# Patient Record
Sex: FEMALE | Race: WHITE | NOT HISPANIC OR LATINO | Employment: FULL TIME | URBAN - NONMETROPOLITAN AREA
[De-identification: names, ages, dates, MRNs, and addresses within clinical notes are randomized per-mention and may not be internally consistent; named-entity substitution may affect disease eponyms.]

---

## 2020-12-10 ENCOUNTER — HOSPITAL ENCOUNTER (INPATIENT)
Facility: HOSPITAL | Age: 40
LOS: 2 days | Discharge: HOME | End: 2020-12-12
Attending: INTERNAL MEDICINE | Admitting: INTERNAL MEDICINE

## 2020-12-10 DIAGNOSIS — K92.2 GASTROINTESTINAL HEMORRHAGE, UNSPECIFIED GASTROINTESTINAL HEMORRHAGE TYPE: Primary | ICD-10-CM

## 2020-12-10 DIAGNOSIS — D64.9 SYMPTOMATIC ANEMIA: ICD-10-CM

## 2020-12-10 DIAGNOSIS — R10.13 ACUTE EPIGASTRIC PAIN: ICD-10-CM

## 2020-12-10 LAB
ABO GROUP BLD: NORMAL
ABO GROUP BLD: NORMAL
ALBUMIN SERPL-MCNC: 4.7 G/DL (ref 3.5–5.2)
ALBUMIN/GLOB SERPL: 1.6 G/DL
ALP SERPL-CCNC: 63 U/L (ref 39–117)
ALT SERPL W P-5'-P-CCNC: 10 U/L (ref 1–33)
ANION GAP SERPL CALCULATED.3IONS-SCNC: 15.4 MMOL/L (ref 5–15)
APTT PPP: 30.7 SECONDS (ref 24.5–37.2)
AST SERPL-CCNC: 19 U/L (ref 1–32)
BILIRUB SERPL-MCNC: 1.3 MG/DL (ref 0–1.2)
BLD GP AB SCN SERPL QL: NEGATIVE
BUN SERPL-MCNC: 6 MG/DL (ref 6–20)
BUN/CREAT SERPL: 11.8 (ref 7–25)
CALCIUM SPEC-SCNC: 9.7 MG/DL (ref 8.6–10.5)
CHLORIDE SERPL-SCNC: 103 MMOL/L (ref 98–107)
CO2 SERPL-SCNC: 20.6 MMOL/L (ref 22–29)
CREAT SERPL-MCNC: 0.51 MG/DL (ref 0.57–1)
DEPRECATED RDW RBC AUTO: 43.1 FL (ref 37–54)
ERYTHROCYTE [DISTWIDTH] IN BLOOD BY AUTOMATED COUNT: 18.1 % (ref 12.3–15.4)
GFR SERPL CREATININE-BSD FRML MDRD: 132 ML/MIN/1.73
GLOBULIN UR ELPH-MCNC: 3 GM/DL
GLUCOSE SERPL-MCNC: 98 MG/DL (ref 65–99)
HCT VFR BLD AUTO: 23.2 % (ref 34–46.6)
HGB BLD-MCNC: 5.8 G/DL (ref 12–15.9)
IRON 24H UR-MRATE: 14 MCG/DL (ref 37–145)
IRON SATN MFR SERPL: 2 % (ref 20–50)
MCH RBC QN AUTO: 16.6 PG (ref 26.6–33)
MCHC RBC AUTO-ENTMCNC: 25 G/DL (ref 31.5–35.7)
MCV RBC AUTO: 66.3 FL (ref 79–97)
PLATELET # BLD AUTO: 132 10*3/MM3 (ref 140–450)
PMV BLD AUTO: ABNORMAL FL
POTASSIUM SERPL-SCNC: 3.3 MMOL/L (ref 3.5–5.2)
PROT SERPL-MCNC: 7.7 G/DL (ref 6–8.5)
RBC # BLD AUTO: 3.5 10*6/MM3 (ref 3.77–5.28)
RH BLD: POSITIVE
RH BLD: POSITIVE
SODIUM SERPL-SCNC: 139 MMOL/L (ref 136–145)
T&S EXPIRATION DATE: NORMAL
TIBC SERPL-MCNC: 602 MCG/DL (ref 298–536)
TRANSFERRIN SERPL-MCNC: 404 MG/DL (ref 200–360)
WBC # BLD AUTO: 3.11 10*3/MM3 (ref 3.4–10.8)

## 2020-12-10 PROCEDURE — 86900 BLOOD TYPING SEROLOGIC ABO: CPT | Performed by: INTERNAL MEDICINE

## 2020-12-10 PROCEDURE — 36430 TRANSFUSION BLD/BLD COMPNT: CPT

## 2020-12-10 PROCEDURE — 80053 COMPREHEN METABOLIC PANEL: CPT | Performed by: INTERNAL MEDICINE

## 2020-12-10 PROCEDURE — 93005 ELECTROCARDIOGRAM TRACING: CPT | Performed by: INTERNAL MEDICINE

## 2020-12-10 PROCEDURE — 86900 BLOOD TYPING SEROLOGIC ABO: CPT

## 2020-12-10 PROCEDURE — P9016 RBC LEUKOCYTES REDUCED: HCPCS

## 2020-12-10 PROCEDURE — 86901 BLOOD TYPING SEROLOGIC RH(D): CPT

## 2020-12-10 PROCEDURE — 85027 COMPLETE CBC AUTOMATED: CPT | Performed by: INTERNAL MEDICINE

## 2020-12-10 PROCEDURE — 86850 RBC ANTIBODY SCREEN: CPT | Performed by: INTERNAL MEDICINE

## 2020-12-10 PROCEDURE — 83540 ASSAY OF IRON: CPT | Performed by: INTERNAL MEDICINE

## 2020-12-10 PROCEDURE — 25010000002 METOCLOPRAMIDE PER 10 MG: Performed by: INTERNAL MEDICINE

## 2020-12-10 PROCEDURE — 86920 COMPATIBILITY TEST SPIN: CPT

## 2020-12-10 PROCEDURE — 84466 ASSAY OF TRANSFERRIN: CPT | Performed by: INTERNAL MEDICINE

## 2020-12-10 PROCEDURE — 85730 THROMBOPLASTIN TIME PARTIAL: CPT | Performed by: INTERNAL MEDICINE

## 2020-12-10 PROCEDURE — 86901 BLOOD TYPING SEROLOGIC RH(D): CPT | Performed by: INTERNAL MEDICINE

## 2020-12-10 PROCEDURE — 99253 IP/OBS CNSLTJ NEW/EST LOW 45: CPT | Performed by: INTERNAL MEDICINE

## 2020-12-10 RX ORDER — PANTOPRAZOLE SODIUM 40 MG/10ML
40 INJECTION, POWDER, LYOPHILIZED, FOR SOLUTION INTRAVENOUS EVERY 12 HOURS SCHEDULED
Status: DISCONTINUED | OUTPATIENT
Start: 2020-12-10 | End: 2020-12-12 | Stop reason: HOSPADM

## 2020-12-10 RX ORDER — SODIUM CHLORIDE 9 MG/ML
75 INJECTION, SOLUTION INTRAVENOUS CONTINUOUS
Status: DISCONTINUED | OUTPATIENT
Start: 2020-12-11 | End: 2020-12-11

## 2020-12-10 RX ORDER — METOCLOPRAMIDE 10 MG/1
10 TABLET ORAL
COMMUNITY
End: 2021-02-10

## 2020-12-10 RX ORDER — METOCLOPRAMIDE HYDROCHLORIDE 5 MG/ML
5 INJECTION INTRAMUSCULAR; INTRAVENOUS
Status: DISCONTINUED | OUTPATIENT
Start: 2020-12-10 | End: 2020-12-12 | Stop reason: HOSPADM

## 2020-12-10 RX ORDER — ONDANSETRON 2 MG/ML
4 INJECTION INTRAMUSCULAR; INTRAVENOUS EVERY 6 HOURS PRN
Status: DISCONTINUED | OUTPATIENT
Start: 2020-12-10 | End: 2020-12-12 | Stop reason: HOSPADM

## 2020-12-10 RX ORDER — SIMETHICONE 80 MG
80 TABLET,CHEWABLE ORAL 4 TIMES DAILY PRN
Status: DISCONTINUED | OUTPATIENT
Start: 2020-12-10 | End: 2020-12-12 | Stop reason: HOSPADM

## 2020-12-10 RX ORDER — PANTOPRAZOLE SODIUM 40 MG/1
40 TABLET, DELAYED RELEASE ORAL DAILY
COMMUNITY
End: 2021-02-10

## 2020-12-10 RX ADMIN — PANTOPRAZOLE SODIUM 40 MG: 40 INJECTION, POWDER, FOR SOLUTION INTRAVENOUS at 21:15

## 2020-12-10 RX ADMIN — METOCLOPRAMIDE 5 MG: 5 INJECTION, SOLUTION INTRAMUSCULAR; INTRAVENOUS at 21:15

## 2020-12-10 RX ADMIN — SODIUM CHLORIDE 75 ML/HR: 9 INJECTION, SOLUTION INTRAVENOUS at 23:44

## 2020-12-11 ENCOUNTER — ANESTHESIA (OUTPATIENT)
Dept: GASTROENTEROLOGY | Facility: HOSPITAL | Age: 40
End: 2020-12-11

## 2020-12-11 ENCOUNTER — APPOINTMENT (OUTPATIENT)
Dept: CT IMAGING | Facility: HOSPITAL | Age: 40
End: 2020-12-11

## 2020-12-11 ENCOUNTER — ANESTHESIA EVENT (OUTPATIENT)
Dept: GASTROENTEROLOGY | Facility: HOSPITAL | Age: 40
End: 2020-12-11

## 2020-12-11 ENCOUNTER — APPOINTMENT (OUTPATIENT)
Dept: ULTRASOUND IMAGING | Facility: HOSPITAL | Age: 40
End: 2020-12-11

## 2020-12-11 PROBLEM — K92.2 GASTROINTESTINAL HEMORRHAGE: Status: ACTIVE | Noted: 2020-12-10

## 2020-12-11 LAB
ALBUMIN SERPL-MCNC: 4.5 G/DL (ref 3.5–5.2)
ALBUMIN/GLOB SERPL: 1.4 G/DL
ALP SERPL-CCNC: 61 U/L (ref 39–117)
ALT SERPL W P-5'-P-CCNC: 11 U/L (ref 1–33)
ANION GAP SERPL CALCULATED.3IONS-SCNC: 13.4 MMOL/L (ref 5–15)
AST SERPL-CCNC: 23 U/L (ref 1–32)
B-HCG UR QL: NEGATIVE
BILIRUB SERPL-MCNC: 2.9 MG/DL (ref 0–1.2)
BUN SERPL-MCNC: 6 MG/DL (ref 6–20)
BUN/CREAT SERPL: 9.5 (ref 7–25)
CALCIUM SPEC-SCNC: 9.6 MG/DL (ref 8.6–10.5)
CHLORIDE SERPL-SCNC: 106 MMOL/L (ref 98–107)
CO2 SERPL-SCNC: 20.6 MMOL/L (ref 22–29)
CREAT SERPL-MCNC: 0.63 MG/DL (ref 0.57–1)
DEPRECATED RDW RBC AUTO: 61.4 FL (ref 37–54)
ERYTHROCYTE [DISTWIDTH] IN BLOOD BY AUTOMATED COUNT: 23.2 % (ref 12.3–15.4)
GFR SERPL CREATININE-BSD FRML MDRD: 104 ML/MIN/1.73
GLOBULIN UR ELPH-MCNC: 3.2 GM/DL
GLUCOSE BLDC GLUCOMTR-MCNC: 89 MG/DL (ref 70–130)
GLUCOSE SERPL-MCNC: 87 MG/DL (ref 65–99)
HCT VFR BLD AUTO: 34.6 % (ref 34–46.6)
HGB BLD-MCNC: 10 G/DL (ref 12–15.9)
MCH RBC QN AUTO: 21.5 PG (ref 26.6–33)
MCHC RBC AUTO-ENTMCNC: 28.9 G/DL (ref 31.5–35.7)
MCV RBC AUTO: 74.4 FL (ref 79–97)
PLATELET # BLD AUTO: 118 10*3/MM3 (ref 140–450)
PMV BLD AUTO: 9.4 FL (ref 6–12)
POTASSIUM SERPL-SCNC: 3.7 MMOL/L (ref 3.5–5.2)
PROT SERPL-MCNC: 7.7 G/DL (ref 6–8.5)
QT INTERVAL: 384 MS
QTC INTERVAL: 456 MS
RBC # BLD AUTO: 4.65 10*6/MM3 (ref 3.77–5.28)
SARS-COV-2 RNA PNL SPEC NAA+PROBE: NOT DETECTED
SODIUM SERPL-SCNC: 140 MMOL/L (ref 136–145)
WBC # BLD AUTO: 3.59 10*3/MM3 (ref 3.4–10.8)

## 2020-12-11 PROCEDURE — 86900 BLOOD TYPING SEROLOGIC ABO: CPT

## 2020-12-11 PROCEDURE — 87635 SARS-COV-2 COVID-19 AMP PRB: CPT | Performed by: INTERNAL MEDICINE

## 2020-12-11 PROCEDURE — 85027 COMPLETE CBC AUTOMATED: CPT | Performed by: INTERNAL MEDICINE

## 2020-12-11 PROCEDURE — 76857 US EXAM PELVIC LIMITED: CPT

## 2020-12-11 PROCEDURE — 80053 COMPREHEN METABOLIC PANEL: CPT | Performed by: INTERNAL MEDICINE

## 2020-12-11 PROCEDURE — 81025 URINE PREGNANCY TEST: CPT | Performed by: INTERNAL MEDICINE

## 2020-12-11 PROCEDURE — 82962 GLUCOSE BLOOD TEST: CPT

## 2020-12-11 PROCEDURE — 25010000002 PROPOFOL 200 MG/20ML EMULSION: Performed by: NURSE ANESTHETIST, CERTIFIED REGISTERED

## 2020-12-11 PROCEDURE — 93010 ELECTROCARDIOGRAM REPORT: CPT | Performed by: INTERNAL MEDICINE

## 2020-12-11 PROCEDURE — 25010000002 IOPAMIDOL 61 % SOLUTION: Performed by: INTERNAL MEDICINE

## 2020-12-11 PROCEDURE — 25010000002 METOCLOPRAMIDE PER 10 MG: Performed by: INTERNAL MEDICINE

## 2020-12-11 PROCEDURE — 0DB98ZX EXCISION OF DUODENUM, VIA NATURAL OR ARTIFICIAL OPENING ENDOSCOPIC, DIAGNOSTIC: ICD-10-PCS | Performed by: INTERNAL MEDICINE

## 2020-12-11 PROCEDURE — 0 DIATRIZOATE MEGLUMINE & SODIUM PER 1 ML: Performed by: INTERNAL MEDICINE

## 2020-12-11 PROCEDURE — 25810000003 SODIUM CHLORIDE 0.9 % WITH KCL 20 MEQ 20-0.9 MEQ/L-% SOLUTION: Performed by: INTERNAL MEDICINE

## 2020-12-11 PROCEDURE — P9016 RBC LEUKOCYTES REDUCED: HCPCS

## 2020-12-11 PROCEDURE — 74177 CT ABD & PELVIS W/CONTRAST: CPT

## 2020-12-11 PROCEDURE — 36430 TRANSFUSION BLD/BLD COMPNT: CPT

## 2020-12-11 PROCEDURE — 43239 EGD BIOPSY SINGLE/MULTIPLE: CPT | Performed by: INTERNAL MEDICINE

## 2020-12-11 RX ORDER — MAGNESIUM HYDROXIDE 1200 MG/15ML
LIQUID ORAL AS NEEDED
Status: DISCONTINUED | OUTPATIENT
Start: 2020-12-11 | End: 2020-12-11 | Stop reason: HOSPADM

## 2020-12-11 RX ORDER — PROPOFOL 10 MG/ML
INJECTION, EMULSION INTRAVENOUS AS NEEDED
Status: DISCONTINUED | OUTPATIENT
Start: 2020-12-11 | End: 2020-12-11 | Stop reason: SURG

## 2020-12-11 RX ORDER — SODIUM CHLORIDE AND POTASSIUM CHLORIDE 150; 900 MG/100ML; MG/100ML
75 INJECTION, SOLUTION INTRAVENOUS CONTINUOUS
Status: DISCONTINUED | OUTPATIENT
Start: 2020-12-11 | End: 2020-12-12

## 2020-12-11 RX ORDER — SODIUM CHLORIDE 9 MG/ML
70 INJECTION, SOLUTION INTRAVENOUS CONTINUOUS PRN
Status: DISCONTINUED | OUTPATIENT
Start: 2020-12-11 | End: 2020-12-12 | Stop reason: HOSPADM

## 2020-12-11 RX ADMIN — DIATRIZOATE MEGLUMINE AND DIATRIZOATE SODIUM 30 ML: 660; 100 LIQUID ORAL; RECTAL at 20:45

## 2020-12-11 RX ADMIN — IOPAMIDOL 100 ML: 612 INJECTION, SOLUTION INTRAVENOUS at 20:45

## 2020-12-11 RX ADMIN — METOCLOPRAMIDE 5 MG: 5 INJECTION, SOLUTION INTRAMUSCULAR; INTRAVENOUS at 07:01

## 2020-12-11 RX ADMIN — PROPOFOL 100 MG: 10 INJECTION, EMULSION INTRAVENOUS at 13:45

## 2020-12-11 RX ADMIN — METOCLOPRAMIDE 5 MG: 5 INJECTION, SOLUTION INTRAMUSCULAR; INTRAVENOUS at 16:48

## 2020-12-11 RX ADMIN — POTASSIUM CHLORIDE AND SODIUM CHLORIDE 75 ML/HR: 900; 150 INJECTION, SOLUTION INTRAVENOUS at 09:35

## 2020-12-11 RX ADMIN — PANTOPRAZOLE SODIUM 40 MG: 40 INJECTION, POWDER, FOR SOLUTION INTRAVENOUS at 08:34

## 2020-12-11 RX ADMIN — SODIUM CHLORIDE: 9 INJECTION, SOLUTION INTRAVENOUS at 13:36

## 2020-12-11 RX ADMIN — METOCLOPRAMIDE 5 MG: 5 INJECTION, SOLUTION INTRAMUSCULAR; INTRAVENOUS at 22:27

## 2020-12-11 RX ADMIN — PANTOPRAZOLE SODIUM 40 MG: 40 INJECTION, POWDER, FOR SOLUTION INTRAVENOUS at 22:27

## 2020-12-11 NOTE — CONSULTS
In Patient Consult      Date of Consultation: December 10, 2020  Patient Name: Amanda Beasley  MRN: [id number]  : 1980     Referring provider: Tra Murphy MD    Primary care provider:  Tra Murphy MD    Reason for consultation: Severe symptomatic anemia    History of Present Illness: This is 40-year-old female patient with a prior history of gastroesophageal reflux disease, chronic anemia, menorrhagia was a sent from PCPs office for severe anemia, upper abdominal pain and reflux reflux symptoms.     Patient states that the she was doing fine until 2 days ago she developed significant reflux symptoms with a epigastric pain and belching.  She felt bloated.  She went to PCPs office and had a prescription for PPI was given blood work was drawn.  Blood work came out today with a severe anemia with a hemoglobin of 5.8 g/dL for which she was been told come to hospital for admission.     Patient currently still states epigastric pain with excessive belching and acid reflux.  She denies any vomiting denies any nausea.  She states that she has been having regular bowel movements without any melena or bright red rectal bleed.  There was no fever chills.  She denies any recent use of NSAIDs however in July she took few days of ibuprofen.     She states that she had a chronic anemia her hemoglobin was running around 8 to 9 g/dL in the past.  Her PCP was in [place] since July patient is in [place].  She states that she had a heavy menstrual periods in the past.  She used to get a periods twice in the month, frequency got improved slowly however she continued to have a excessive bleeding monthly.  She is currently having periods.    She denies any prior history of any endoscopy or colonoscopy.  No family history of any colon cancer or any GI malignancy.  She denies any prior history peptic ulcer disease.     Subjective     Past Medical History:   Diagnosis Date   • GERD (gastroesophageal reflux disease)         History reviewed. No pertinent surgical history.    History reviewed. No pertinent family history.    Social History     Socioeconomic History   • Marital status:      Spouse name: Not on file   • Number of children: Not on file   • Years of education: Not on file   • Highest education level: Not on file   Tobacco Use   • Smoking status: Never Smoker   • Smokeless tobacco: Never Used   Substance and Sexual Activity   • Alcohol use: Never     Frequency: Never   • Drug use: Never         Current Facility-Administered Medications:   •  metoclopramide (REGLAN) injection 5 mg, 5 mg, Intravenous, 4x Daily AC & at Bedtime, Tra Murphy MD  •  ondansetron (ZOFRAN) injection 4 mg, 4 mg, Intravenous, Q6H PRN, Tra Murphy MD  •  pantoprazole (PROTONIX) injection 40 mg, 40 mg, Intravenous, Q12H, Tra Murphy MD  •  simethicone (MYLICON) chewable tablet 80 mg, 80 mg, Oral, 4x Daily PRN, Tra Murphy MD  •  [START ON 12/11/2020] sodium chloride 0.9 % infusion, 75 mL/hr, Intravenous, Continuous, Tra Murphy MD    No Known Allergies    Review of Systems   Constitutional: Negative for chills, fever, unexpected weight gain and unexpected weight loss.   HENT: Negative for congestion, ear pain, postnasal drip, sinus pressure and sore throat.    Eyes: Negative for blurred vision and visual disturbance.   Respiratory: Negative for cough, chest tightness and shortness of breath.    Cardiovascular: Negative for chest pain, palpitations and leg swelling.   Gastrointestinal: Positive for abdominal pain and indigestion. Negative for blood in stool, constipation, diarrhea, nausea and vomiting.        Excessive belching   Endocrine: Negative for polyphagia.   Genitourinary: Negative for dysuria and hematuria.   Musculoskeletal: Negative for back pain, joint swelling and neck pain.   Skin: Negative for rash, skin lesions and bruise.   Neurological: Negative for dizziness, seizures, speech difficulty, weakness,  "numbness and confusion.   Hematological: Negative for adenopathy. Does not bruise/bleed easily.   Psychiatric/Behavioral: Negative for hallucinations, suicidal ideas and depressed mood.        The following portions of the patient's history were reviewed and updated as appropriate: allergies, current medications, past family history, past medical history, past social history, past surgical history and problem list.    Objective     Vitals:    12/10/20 1500 12/10/20 2009   BP: 134/97 143/91   BP Location: Right arm Right arm   Patient Position: Lying Lying   Pulse: 101 98   Resp: 18 18   Temp: 98.9 °F (37.2 °C) 98.4 °F (36.9 °C)   TempSrc: Oral Oral   SpO2: 100%    Weight: 53 kg (116 lb 13.5 oz)    Height: 152.4 cm (60\")        Physical Exam  Vitals signs and nursing note reviewed.   Constitutional:       Appearance: Normal appearance. She is well-developed.   HENT:      Head: Normocephalic and atraumatic.      Right Ear: External ear normal.      Left Ear: External ear normal.   Eyes:      Comments: Severe pallor noted   Neck:      Musculoskeletal: Normal range of motion and neck supple.      Thyroid: No thyromegaly.      Trachea: No tracheal deviation.   Cardiovascular:      Rate and Rhythm: Normal rate and regular rhythm.      Heart sounds: No murmur.   Pulmonary:      Effort: Pulmonary effort is normal. No respiratory distress.      Breath sounds: Normal breath sounds.   Abdominal:      General: Bowel sounds are normal. There is no distension.      Palpations: Abdomen is soft. There is no mass.      Tenderness: There is abdominal tenderness (Mild to moderate tenderness noted in the epigastric region).      Hernia: No hernia is present.   Musculoskeletal: Normal range of motion.   Skin:     General: Skin is warm and dry.   Neurological:      General: No focal deficit present.      Mental Status: She is alert and oriented to person, place, and time.      Cranial Nerves: No cranial nerve deficit.      Sensory: No " sensory deficit.   Psychiatric:         Mood and Affect: Mood normal.         Behavior: Behavior normal.         Thought Content: Thought content normal.         Judgment: Judgment normal.         Results from last 7 days   Lab Units 12/10/20  1840   SODIUM mmol/L 139   POTASSIUM mmol/L 3.3*   CHLORIDE mmol/L 103   CO2 mmol/L 20.6*   BUN mg/dL 6   CREATININE mg/dL 0.51*   CALCIUM mg/dL 9.7   ALBUMIN g/dL 4.70   BILIRUBIN mg/dL 1.3*   ALK PHOS U/L 63   ALT (SGPT) U/L 10   AST (SGOT) U/L 19   GLUCOSE mg/dL 98   WBC 10*3/mm3 3.11*   HEMOGLOBIN g/dL 5.8*   PLATELETS 10*3/mm3 132*       Imaging Results (Last 24 Hours)     ** No results found for the last 24 hours. **          Assessment / Plan      Assessment/Recommendations:     1.  Severe symptomatic anemia  2.  Severe iron deficiency anemia  3.  Suspected a peptic ulcer disease with a possible GI bleed  4.  Gastroesophageal reflux disease  Patient appears to have a chronic iron deficiency anemia.  As per patient her hemoglobin was around 8-9 6-month ago when she was in [place].  She continued to have a heavy vaginal bleed during her periods.   Her anemia appears to be chronic.  However given her epigastric pain worsening reflux symptoms acute peptic ulcer disease and possible upper GI bleed cannot be ruled out.  Patient herself denies any melena or bright red rectal bleed.   She needs an EGD to begin with for further evaluation.  She may also need a colonoscopy if the EGD is normal.  I have discussed the risk and benefits involved with endoscopy and she is agreeable for the procedure.  She can have a clear liquids early in the morning  Keep n.p.o. after 8 AM tomorrow  Transfuse 3 units of PRBC tonight  Repeat CBC in a.m.  She has been scheduled for an EGD at noon  Protonix 40 mg IV twice daily or Protonix drip 8 mg/h  She needs a pelvic ultrasound to rule out uterine fibroids and other pathology to rule out with her severe menorrhagia    5.  Menorrhagia  Pelvic  ultrasound inpatient  Needs outpatient GYN consultation      Thank you very much for letting me participate in the care of this patient.  Please do not hesitate to call me if you have any questions.    Jay Sharma MD  [facility] [place]  12/10/2020  20:15 EST    Please note that portions of this note may have been completed with a voice recognition program.

## 2020-12-11 NOTE — PROGRESS NOTES
[facility]    Name:  Amanda Beasley   Age:  40 y.o.  Sex:  female  :  1980  MRN:  [id number]   Visit Number:  [id number]  Admission Date:  12/10/2020  Date Of Service:  20  Primary Care Physician:  Tra Murphy MD     LOS: 1 day :  Patient Care Team:  Tra Murphy MD as PCP - General (Internal Medicine):      Subjective / Interval History:     40-year-old patient who has been directly admitted because of symptomatic anemia and GI bleed along with menometrorrhagia.  Patient's hemoglobin was 5.8 upon admission.  She has been given 3 transfusions and third transfusion is ongoing.  She is n.p.o. for EGD.  She has got IV Protonix.    Today her symptoms are better at present she is n.p.o. and has not had much pain.  Yesterday evening after eating she had worse dyspepsia along with epigastric pain.      Vital Signs:    Temp:  [97.5 °F (36.4 °C)-98.9 °F (37.2 °C)] 98.2 °F (36.8 °C)  Heart Rate:  [] 78  Resp:  [16-18] 16  BP: (114-143)/(80-97) 114/85    Intake and output:    I/O last 3 completed shifts:  In: 600 [Blood:600]  Out: 100 [Urine:100]  No intake/output data recorded.    Physical Examination:    General Appearance:    Alert and cooperative, not in any acute distress.   Head:    Atraumatic and normocephalic, without obvious abnormality.   Eyes:            PERRLA,  No pallor. Extraocular movements are within normal limits.   Neck:   Supple,  No lymph glands, no bruit   Lungs:     Chest shape is normal. Breath sounds heard bilaterally equally.  No crackles or wheezing.     Heart:    Normal S1 and S2, no murmur,  No JVD   Abdomen:     Normal bowel sounds, no masses, no organomegaly. Soft     mild tenderness on epigastric region on deep palpation,, no guarding, no rebound tenderness   Extremities:   Moves all extremities well, no edema, no cyanosis,    Skin:   No  bruising or rash.   Neurologic:   Grossly nonfocal and moves all extremities.       Laboratory results:  Results from last 7 days   Lab Units 12/10/20  1840   SODIUM mmol/L 139   POTASSIUM mmol/L 3.3*   CHLORIDE mmol/L 103   CO2 mmol/L 20.6*   BUN mg/dL 6   CREATININE mg/dL 0.51*   CALCIUM mg/dL 9.7   BILIRUBIN mg/dL 1.3*   ALK PHOS U/L 63   ALT (SGPT) U/L 10   AST (SGOT) U/L 19   GLUCOSE mg/dL 98     Results from last 7 days   Lab Units 12/10/20  1840   WBC 10*3/mm3 3.11*   HEMOGLOBIN g/dL 5.8*   HEMATOCRIT % 23.2*   PLATELETS 10*3/mm3 132*                   Radiology results:    Imaging Results (Last 24 Hours)     ** No results found for the last 24 hours. **          I have reviewed the patient's radiology reports.    Medication Review:     I have reviewed the patients active and prn medications.     Assessment:      GI bleeding    Symptomatic anemia    Acute epigastric pain    Gastrointestinal hemorrhage  Hypokalemia      Plan:    1.  GI bleeding-most probably it is peptic ulcer disease.  Patient is on IV Protonix and EGD to be planned today    2.  Symptomatic anemia with hemoglobin of 5.8-at present third transfusion is ongoing and will repeat labs at noon    3.  Menometrorrhagia history of excessive bleeding will do pelvic ultrasound and a GYN consult as needed most likely outpatient    4.  Hypokalemia will replace with the IV fluids at present and follow-up    Tra Murphy MD  12/11/20  08:35 EST      Please note that portions of this note were completed with a voice recognition program.

## 2020-12-11 NOTE — ANESTHESIA POSTPROCEDURE EVALUATION
Patient: Amanda Beasley    Procedure Summary     Date: 12/11/20 Room / Location: [facility] ENDOSCOPY 1 / [facility] ENDOSCOPY    Anesthesia Start: 1335 Anesthesia Stop: 1358    Procedure: ESOPHAGOGASTRODUODENOSCOPY WITH BIOPSY (N/A Esophagus) Diagnosis:       Gastrointestinal hemorrhage, unspecified gastrointestinal hemorrhage type      Symptomatic anemia      Acute epigastric pain      (Gastrointestinal hemorrhage, unspecified gastrointestinal hemorrhage type [K92.2])      (Symptomatic anemia [D64.9])      (Acute epigastric pain [R10.13])    Surgeon: Jay Sharma MD Provider: Josh Butler CRNA    Anesthesia Type: MAC ASA Status: 3          Anesthesia Type: MAC    Vitals  Vitals Value Taken Time   /85 12/11/20 1423   Temp 98.1 °F (36.7 °C) 12/11/20 1400   Pulse 65 12/11/20 1549   Resp 14 12/11/20 1423   SpO2 100 % 12/11/20 1425   Vitals shown include unvalidated device data.        Post Anesthesia Care and Evaluation    Patient location during evaluation: PACU  Patient participation: complete - patient participated  Level of consciousness: awake  Pain score: 0  Pain management: satisfactory to patient  Airway patency: patent  Anesthetic complications: No anesthetic complications  PONV Status: none  Cardiovascular status: acceptable and hemodynamically stable  Respiratory status: acceptable  Hydration status: acceptable

## 2020-12-11 NOTE — H&P
[facility]   HISTORY AND PHYSICAL      Name:  Amanda Beasley   Age:  40 y.o.  Sex:  female  :  1980  MRN:  [id number]   Visit Number:  [id number]  Admission Date:  12/10/2020  Date Of Service:  12/10/20  Primary Care Physician:  Tra Murphy MD     Admitting diagnosis:      GI bleeding    Symptomatic anemia    Acute epigastric pain        History Of Presenting Illness:      40-year-old patient with no significant past medical history was seen by me yesterday in the office because of epigastric pain.  She has been taking nonsteroidals over-the-counter due to musculoskeletal pain.  Patient started having nausea with eating also having abdominal epigastric pain after eating since last few days.  Patient had been started on Protonix and Reglan for presumed peptic ulcer disease and gastritis.  She had labs ordered and today the results came back showing hemoglobin was 5.8.  Patient states that she felt almost dizzy passing out like feeling.  She has been directly admitted for management of blood transfusion and evaluation for GI bleeding and IV Protonix which is needed.  Patient most likely has active bleeding from upper gastrointestinal tract with worsening symptomatology.  GI consult has been done    Review Of Systems:     The following systems were reviewed and negative;  constitution, eyes, ENT, respiratory, cardiovascular, gastrointestinal, genitourinary, musculoskeletal, neurological and behavioral/psych,  Skin except as above.     Past Medical History:    Past Medical History:   Diagnosis Date   • GERD (gastroesophageal reflux disease)        Past Surgical history:    History reviewed. No pertinent surgical history.    Social History:    Social History     Socioeconomic History   • Marital status:      Spouse name: Not on file   • Number of children: Not on file   • Years of education: Not on file   • Highest education level: Not on file   Tobacco Use   •  Smoking status: Never Smoker   • Smokeless tobacco: Never Used   Substance and Sexual Activity   • Alcohol use: Never     Frequency: Never   • Drug use: Never       Family History:    History reviewed. No pertinent family history.      Allergies:      Patient has no known allergies.    Home Medications:    Prior to Admission Medications     Prescriptions Last Dose Informant Patient Reported? Taking?    metoclopramide (REGLAN) 10 MG tablet 12/10/2020  Yes Yes    Take 10 mg by mouth 4 (Four) Times a Day Before Meals & at Bedtime.    pantoprazole (PROTONIX) 40 MG EC tablet 12/9/2020  Yes Yes    Take 40 mg by mouth Daily.                 Vital Signs:    Temp:  [98.9 °F (37.2 °C)] 98.9 °F (37.2 °C)  Heart Rate:  [101] 101  Resp:  [18] 18  BP: (134)/(97) 134/97        12/10/20  1500   Weight: 53 kg (116 lb 13.5 oz)       Body mass index is 22.82 kg/m².    Physical Exam:      General Appearance:    Alert and cooperative,  And lying comfortably in bed   Head:    Atraumatic and normocephalic, without obvious abnormality.   Eyes:            PERRLA, conjunctivae and sclerae normal, no Icterus. No pallor. Extraocular movements are within normal limits.   Ears:    Ears appear intact with no abnormalities noted.   Throat:   No oral lesions, no thrush, oral mucosa moist.   Neck:   Supple, trachea midline, no thyromegaly, no carotid bruit, no lymphadenopathy  Lungs-bilateral air entry no crepitations wheezes    Heart:    Normal S1 and S2, no murmur, no gallop, no rub. No JVD   Abdomen:     Normal bowel sounds, no masses, no organomegaly. Soft   there is tenderness over the epigastric region, nondistended, no guarding, no rebound    tenderness   Extremities:   Moves all extremities well, no edema, no cyanosis, no          clubbing   Skin:   No  bruising or rash   Neurologic:   Cranial nerves 2 - 12 grossly intact, sensation intact, Motor power is normal and equal bilaterally.       EKG:      Not done    Labs:    Lab Results (last 24  hours)     Procedure Component Value Units Date/Time    Comprehensive Metabolic Panel [[id number]] Collected: 12/10/20 1840    Specimen: Blood from Arm, Right Updated: 12/10/20 1850    CBC (No Diff) [[id number]] Collected: 12/10/20 1840    Specimen: Blood from Arm, Right Updated: 12/10/20 1850    aPTT [[id number]] Collected: 12/10/20 1840    Specimen: Blood from Arm, Right Updated: 12/10/20 1850          Radiology:    Imaging Results (Last 72 Hours)     ** No results found for the last 72 hours. **          Assessment:    Assessment/Plan       GI bleeding    Symptomatic anemia    Acute epigastric pain        Plan:     40-year-old who has used nonsteroidals comes in with epigastric pain worsening pain after eating and nausea.  Hemoglobin is 5.8 will symptomatic and most probably having upper GI bleeding.  Start with IV Protonix do stat labs and transfuse 3 units to maintain hemoglobin at least above 8.5.  GI consult has been done and will plan to proceed EGD tomorrow.  Case discussed with Dr. Sharma and she will be kept n.p.o.    See rest as per orders and further management changes will be done as per findings and symptomatology    Tra Murphy MD  12/10/20  19:05 EST    Please note that portions of this note were completed with a voice recognition program.

## 2020-12-11 NOTE — PROGRESS NOTES
Discharge Planning Assessment  [facility]     Patient Name: Amanda Beasley  MRN: [id number]  Today's Date: 12/11/2020    Admit Date: 12/10/2020    Discharge Needs Assessment     Row Name 12/11/20 1221       Living Environment    Lives With  child(monet), dependent;spouse    Name(s) of Who Lives With Patient  Spouse and three daughters    Current Living Arrangements  home/apartment/condo    Primary Care Provided by  self    Provides Primary Care For  no one    Family Caregiver if Needed  spouse;other (see comments) older kids    Quality of Family Relationships  supportive    Able to Return to Prior Arrangements  yes       Resource/Environmental Concerns    Resource/Environmental Concerns  none    Transportation Concerns  car, none       Transition Planning    Patient/Family Anticipates Transition to  home    Patient/Family Anticipated Services at Transition  none    Transportation Anticipated  family or friend will provide       Discharge Needs Assessment    Readmission Within the Last 30 Days  no previous admission in last 30 days    Equipment Currently Used at Home  none    Concerns to be Addressed  no discharge needs identified    Anticipated Changes Related to Illness  none    Equipment Needed After Discharge  none        Discharge Plan    Met with patient and oldest daughter Bhaskar in room.  Bhaskar interprets some for the patient, as her english is poor. Permission granted to speak to bhaskar. All demographic information verified and corrected.  No POA or living will.  Patient lives in a two story apartment with her  and their three daughters.  She is independent, and does not have any medical equipment or services at home. No needs identified from a CM standpoint. CM contact card left with patient.         Continued Care and Services - Admitted Since 12/10/2020    Coordination has not been started for this encounter.         Demographic Summary     Row Name 12/11/20 1216       General Information     Admission Type  inpatient    Arrived From  emergency department    Referral Source  admission list    Reason for Consult  discharge planning    Preferred Language  Johanna       Contact Information    Permission Granted to Share Info With  family/designee    Row Name 12/11/20 1157       General Information    Admission Type  inpatient    Arrived From  emergency department    Referral Source  admission list     Used During This Interaction  other (see comments) Daughter in room helps with translation.  The patient speaks a little English.        Functional Status     Row Name 12/11/20 1217       Functional Status    Usual Activity Tolerance  excellent    Current Activity Tolerance  good       Functional Status, IADL    Medications  independent    Meal Preparation  independent    Housekeeping  independent    Laundry  independent    Shopping  independent       Mental Status    General Appearance WDL  WDL       Mental Status Summary    Recent Changes in Mental Status/Cognitive Functioning  no changes        Psychosocial     Row Name 12/11/20 1218       Values/Beliefs    Spiritual, Cultural Beliefs, Yarsani Practices, Values that Affect Care  no       Behavior WDL    Behavior WDL  WDL       Emotion Mood WDL    Emotion/Mood/Affect WDL  WDL       Speech WDL    Speech WDL  WDL       Perceptual State WDL    Perceptual State WDL  WDL       Thought Process WDL    Thought Process WDL  WDL       Intellectual Performance WDL    Intellectual Performance WDL  WDL       Coping/Stress    Major Change/Loss/Stressor  illness    Patient Personal Strengths  strong support system    Sources of Support  dependent child(monet);spouse    Reaction to Health Status  accepting;adjusting    Understanding of Condition and Treatment  adequate understanding of medical condition;adequate understanding of treatment       Developmental Stage (Eriksson's)    Developmental Stage  Stage 7 (35-65 years/Middle Adulthood) Generativity vs.  Stagnation       C-SSRS (Recent)    Q1 Wished to be Dead (Past Month)  no    Q2 Suicidal Thoughts (Past Month)  no    Q6 Suicide Behavior (Lifetime)  no       Violence Risk    Feels Like Hurting Others  no    Previous Attempt to Harm Others  no        Abuse/Neglect     Row Name 12/11/20 1220       Personal Safety    Personal Safety Comments  denies any safety concerns    Row Name 12/11/20 1219       Personal Safety    Feels Unsafe at Home or Work/School  no    Feels Threatened by Someone  no    Does Anyone Try to Keep You From Having Contact with Others or Doing Things Outside Your Home?  no    Physical Signs of Abuse Present  no        Legal     Row Name 12/11/20 1220       Financial/Legal    Source of Income  salary/wages    Who Manages Finances if Patient Unable          Substance Abuse     Row Name 12/11/20 1219       Family Member Substance Use (#4)    Family History of Substance Use  none denies substance abuse in family household        Patient Forms    No documentation.           Luci Rush, RN

## 2020-12-11 NOTE — PLAN OF CARE
Goal Outcome Evaluation:  Plan of Care Reviewed With: patient  Progress: no change  Outcome Summary: VSS.  Pt received 3 units of PRBCs and tolerated them with no issues.  No acute changes to pt condition during shift.  Will continuet to monitor.

## 2020-12-11 NOTE — PLAN OF CARE
Goal Outcome Evaluation:  Plan of Care Reviewed With: spouse, patient  Progress: improving   Down for EGD today.  Vitals unremarkable.  No reports of pain.  Independent ambulation.  No acute events this shift.

## 2020-12-11 NOTE — ANESTHESIA PREPROCEDURE EVALUATION
Anesthesia Evaluation     Patient summary reviewed and Nursing notes reviewed   no history of anesthetic complications:  NPO Solid Status: > 8 hours  NPO Liquid Status: > 8 hours           Airway   Mallampati: I  TM distance: >3 FB  Neck ROM: full  no difficulty expected  Dental - normal exam     Pulmonary - negative pulmonary ROS and normal exam   Cardiovascular - negative cardio ROS and normal exam  Exercise tolerance: good (4-7 METS)        Neuro/Psych- negative ROS  GI/Hepatic/Renal/Endo    (+)  GERD, GI bleeding ,     Musculoskeletal (-) negative ROS    Abdominal    Substance History - negative use     OB/GYN negative ob/gyn ROS         Other - negative ROS         Other Comment: hgb 5.8  Transfused 3 units 12/11.                 Anesthesia Plan    ASA 3     MAC

## 2020-12-12 VITALS
HEART RATE: 67 BPM | OXYGEN SATURATION: 98 % | TEMPERATURE: 98 F | BODY MASS INDEX: 23.37 KG/M2 | WEIGHT: 119.05 LBS | RESPIRATION RATE: 18 BRPM | SYSTOLIC BLOOD PRESSURE: 126 MMHG | DIASTOLIC BLOOD PRESSURE: 90 MMHG | HEIGHT: 60 IN

## 2020-12-12 PROBLEM — D50.0 IRON DEFICIENCY ANEMIA DUE TO CHRONIC BLOOD LOSS: Status: ACTIVE | Noted: 2020-12-12

## 2020-12-12 PROBLEM — N92.1 MENOMETRORRHAGIA: Status: ACTIVE | Noted: 2020-12-12

## 2020-12-12 PROBLEM — K29.00 ACUTE GASTRITIS: Status: ACTIVE | Noted: 2020-12-12

## 2020-12-12 PROBLEM — E80.6 HYPERBILIRUBINEMIA: Status: ACTIVE | Noted: 2020-12-12

## 2020-12-12 PROBLEM — D69.6 THROMBOCYTOPENIA: Status: ACTIVE | Noted: 2020-12-12

## 2020-12-12 LAB
ALBUMIN SERPL-MCNC: 3.9 G/DL (ref 3.5–5.2)
ALBUMIN/GLOB SERPL: 1.3 G/DL
ALP SERPL-CCNC: 55 U/L (ref 39–117)
ALT SERPL W P-5'-P-CCNC: 6 U/L (ref 1–33)
ANION GAP SERPL CALCULATED.3IONS-SCNC: 9.5 MMOL/L (ref 5–15)
AST SERPL-CCNC: 18 U/L (ref 1–32)
BH BB BLOOD EXPIRATION DATE: NORMAL
BH BB BLOOD TYPE BARCODE: 7300
BH BB DISPENSE STATUS: NORMAL
BH BB PRODUCT CODE: NORMAL
BH BB UNIT NUMBER: NORMAL
BILIRUB SERPL-MCNC: 2.8 MG/DL (ref 0–1.2)
BUN SERPL-MCNC: 6 MG/DL (ref 6–20)
BUN/CREAT SERPL: 11.1 (ref 7–25)
CALCIUM SPEC-SCNC: 9.1 MG/DL (ref 8.6–10.5)
CHLORIDE SERPL-SCNC: 108 MMOL/L (ref 98–107)
CO2 SERPL-SCNC: 20.5 MMOL/L (ref 22–29)
CREAT SERPL-MCNC: 0.54 MG/DL (ref 0.57–1)
CROSSMATCH INTERPRETATION: NORMAL
DEPRECATED RDW RBC AUTO: 61 FL (ref 37–54)
ERYTHROCYTE [DISTWIDTH] IN BLOOD BY AUTOMATED COUNT: 22.7 % (ref 12.3–15.4)
GFR SERPL CREATININE-BSD FRML MDRD: 124 ML/MIN/1.73
GLOBULIN UR ELPH-MCNC: 2.9 GM/DL
GLUCOSE SERPL-MCNC: 76 MG/DL (ref 65–99)
HCT VFR BLD AUTO: 33 % (ref 34–46.6)
HGB BLD-MCNC: 9.6 G/DL (ref 12–15.9)
MCH RBC QN AUTO: 21.7 PG (ref 26.6–33)
MCHC RBC AUTO-ENTMCNC: 29.1 G/DL (ref 31.5–35.7)
MCV RBC AUTO: 74.5 FL (ref 79–97)
PLATELET # BLD AUTO: 90 10*3/MM3 (ref 140–450)
POTASSIUM SERPL-SCNC: 3.7 MMOL/L (ref 3.5–5.2)
PROT SERPL-MCNC: 6.8 G/DL (ref 6–8.5)
RBC # BLD AUTO: 4.43 10*6/MM3 (ref 3.77–5.28)
SODIUM SERPL-SCNC: 138 MMOL/L (ref 136–145)
UNIT  ABO: NORMAL
UNIT  RH: NORMAL
WBC # BLD AUTO: 2.86 10*3/MM3 (ref 3.4–10.8)

## 2020-12-12 PROCEDURE — 25810000003 SODIUM CHLORIDE 0.9 % WITH KCL 20 MEQ 20-0.9 MEQ/L-% SOLUTION: Performed by: INTERNAL MEDICINE

## 2020-12-12 PROCEDURE — 85027 COMPLETE CBC AUTOMATED: CPT | Performed by: INTERNAL MEDICINE

## 2020-12-12 PROCEDURE — 80053 COMPREHEN METABOLIC PANEL: CPT | Performed by: INTERNAL MEDICINE

## 2020-12-12 PROCEDURE — 25010000002 METOCLOPRAMIDE PER 10 MG: Performed by: INTERNAL MEDICINE

## 2020-12-12 PROCEDURE — 99238 HOSP IP/OBS DSCHRG MGMT 30/<: CPT | Performed by: INTERNAL MEDICINE

## 2020-12-12 PROCEDURE — 99232 SBSQ HOSP IP/OBS MODERATE 35: CPT | Performed by: INTERNAL MEDICINE

## 2020-12-12 RX ORDER — SIMETHICONE 80 MG
80 TABLET,CHEWABLE ORAL 4 TIMES DAILY PRN
Qty: 40 TABLET | Refills: 0 | Status: SHIPPED | OUTPATIENT
Start: 2020-12-12 | End: 2021-02-10

## 2020-12-12 RX ORDER — DOXYCYCLINE HYCLATE 50 MG/1
324 CAPSULE, GELATIN COATED ORAL
Qty: 30 TABLET | Refills: 0 | Status: SHIPPED | OUTPATIENT
Start: 2020-12-12 | End: 2021-02-10

## 2020-12-12 RX ADMIN — POTASSIUM CHLORIDE AND SODIUM CHLORIDE 75 ML/HR: 900; 150 INJECTION, SOLUTION INTRAVENOUS at 05:25

## 2020-12-12 RX ADMIN — PANTOPRAZOLE SODIUM 40 MG: 40 INJECTION, POWDER, FOR SOLUTION INTRAVENOUS at 08:56

## 2020-12-12 RX ADMIN — METOCLOPRAMIDE 5 MG: 5 INJECTION, SOLUTION INTRAMUSCULAR; INTRAVENOUS at 06:52

## 2020-12-12 NOTE — PLAN OF CARE
Goal Outcome Evaluation:  Plan of Care Reviewed With: patient  Progress: improving  Outcome Summary: VSS.  No acute changes in Pt condition to report.  Will continue to monitor.

## 2020-12-12 NOTE — PROGRESS NOTES
Case Management Discharge Note                Selected Continued Care - Discharged on 12/12/2020 Admission date: 12/10/2020 - Discharge disposition: Home or Self Care    Destination    No services have been selected for the patient.              Durable Medical Equipment    No services have been selected for the patient.              Dialysis/Infusion    No services have been selected for the patient.              Home Medical Care    No services have been selected for the patient.              Therapy    No services have been selected for the patient.              Community Resources    No services have been selected for the patient.                       Final Discharge Disposition Code: 01 - home or self-care

## 2020-12-12 NOTE — PROGRESS NOTES
In Patient Consult      Date of Consultation: 2020  Patient Name: Amanda Beasley  MRN: [id number]  : 1980     Referring provider: Tra Murphy MD    Primary care provider:  Tra Murphy MD    Reason for consultation: Acute severe anemia    Interval history:   2020  She is feeling much better still has some burping and bloating sensation.  Denies any bowel movement since yesterday.  No nausea vomiting.  She had a an endoscopy done on 2020.  No events post procedure.    12/10/2020  This is 40-year-old female patient with a prior history of gastroesophageal reflux disease, chronic anemia, menorrhagia was a sent from PCPs office for severe anemia, upper abdominal pain and reflux reflux symptoms.      Patient states that the she was doing fine until 2 days ago she developed significant reflux symptoms with a epigastric pain and belching.  She felt bloated.  She went to PCPs office and had a prescription for PPI was given blood work was drawn.  Blood work came out today with a severe anemia with a hemoglobin of 5.8 g/dL for which she was been told come to hospital for admission.      Patient currently still states epigastric pain with excessive belching and acid reflux.  She denies any vomiting denies any nausea.  She states that she has been having regular bowel movements without any melena or bright red rectal bleed.  There was no fever chills.  She denies any recent use of NSAIDs however in July she took few days of ibuprofen.      She states that she had a chronic anemia her hemoglobin was running around 8 to 9 g/dL in the past.  Her PCP was in [place] since July patient is in [place].  She states that she had a heavy menstrual periods in the past.  She used to get a periods twice in the month, frequency got improved slowly however she continued to have a excessive bleeding monthly.  She is currently having periods.     She denies any prior history of any endoscopy or  colonoscopy.  No family history of any colon cancer or any GI malignancy.  She denies any prior history peptic ulcer disease.      Subjective     Past Medical History:   Diagnosis Date   • GERD (gastroesophageal reflux disease)        History reviewed. No pertinent surgical history.    History reviewed. No pertinent family history.    Social History     Socioeconomic History   • Marital status:      Spouse name: Not on file   • Number of children: Not on file   • Years of education: Not on file   • Highest education level: Not on file   Tobacco Use   • Smoking status: Never Smoker   • Smokeless tobacco: Never Used   Substance and Sexual Activity   • Alcohol use: Never     Frequency: Never   • Drug use: Never         Current Facility-Administered Medications:   •  metoclopramide (REGLAN) injection 5 mg, 5 mg, Intravenous, 4x Daily AC & at Bedtime, Jay Sharma MD, 5 mg at 12/12/20 0652  •  ondansetron (ZOFRAN) injection 4 mg, 4 mg, Intravenous, Q6H PRN, Jay Sharma MD  •  pantoprazole (PROTONIX) injection 40 mg, 40 mg, Intravenous, Q12H, Jay Sharma MD, 40 mg at 12/12/20 0856  •  simethicone (MYLICON) chewable tablet 80 mg, 80 mg, Oral, 4x Daily PRN, Jay Sharma MD  •  sodium chloride 0.9 % infusion, 70 mL/hr, Intravenous, Continuous PRN, Jay Sharma MD, Currently Infusing at 12/11/20 1337    No Known Allergies    Review of Systems   Constitutional: Positive for fatigue. Negative for appetite change and unexpected weight loss.   Gastrointestinal: Positive for GERD and indigestion. Negative for abdominal distention, abdominal pain, anal bleeding, blood in stool, constipation, diarrhea, nausea, rectal pain and vomiting.       The following portions of the patient's history were reviewed and updated as appropriate: allergies, current medications, past family history, past medical history, past social history, past surgical history and problem  list.    Objective     Vitals:    12/12/20 0020 12/12/20 0230 12/12/20 0414 12/12/20 0836   BP: 109/69  117/89 126/90   BP Location: Right arm  Right arm Right arm   Patient Position: Lying  Lying Lying   Pulse: 58 63 60 67   Resp: 14  12 18   Temp: 97.3 °F (36.3 °C)  97.2 °F (36.2 °C) 98 °F (36.7 °C)   TempSrc: Oral  Oral Oral   SpO2: 100%  100% 98%   Weight:       Height:           Physical Exam  Vitals signs and nursing note reviewed.   Constitutional:       Appearance: She is well-developed.   HENT:      Mouth/Throat:      Mouth: Mucous membranes are moist.   Eyes:      Comments: Pallor present   Neck:      Musculoskeletal: Normal range of motion and neck supple.   Cardiovascular:      Rate and Rhythm: Normal rate and regular rhythm.      Heart sounds: No murmur.   Pulmonary:      Effort: Pulmonary effort is normal. No respiratory distress.      Breath sounds: Normal breath sounds.   Abdominal:      General: Bowel sounds are normal. There is no distension.      Palpations: Abdomen is soft. There is no mass.      Tenderness: There is no abdominal tenderness.      Hernia: No hernia is present.   Lymphadenopathy:      Cervical: No cervical adenopathy.   Skin:     General: Skin is warm and dry.   Neurological:      General: No focal deficit present.      Mental Status: She is alert and oriented to person, place, and time.      Sensory: No sensory deficit.         Results from last 7 days   Lab Units 12/12/20  0857 12/11/20  1058 12/10/20  1840   SODIUM mmol/L 138 140 139   POTASSIUM mmol/L 3.7 3.7 3.3*   CHLORIDE mmol/L 108* 106 103   CO2 mmol/L 20.5* 20.6* 20.6*   BUN mg/dL 6 6 6   CREATININE mg/dL 0.54* 0.63 0.51*   CALCIUM mg/dL 9.1 9.6 9.7   ALBUMIN g/dL 3.90 4.50 4.70   BILIRUBIN mg/dL 2.8* 2.9* 1.3*   ALK PHOS U/L 55 61 63   ALT (SGPT) U/L 6 11 10   AST (SGOT) U/L 18 23 19   GLUCOSE mg/dL 76 87 98   WBC 10*3/mm3 2.86* 3.59 3.11*   HEMOGLOBIN g/dL 9.6* 10.0* 5.8*   PLATELETS 10*3/mm3 90* 118* 132*       Imaging  Results (Last 24 Hours)     Procedure Component Value Units Date/Time    CT Abdomen Pelvis With Contrast [[id number]] Collected: 12/11/20 2017     Updated: 12/11/20 2018    Narrative:      FINAL REPORT    TECHNIQUE:  Routine axial images through the abdomen and pelvis were  obtained following IV and oral contrast administration.    CLINICAL HISTORY:  Severe symptomatic anemia, No obvious GI source of bleeding    FINDINGS:  Abdomen: The gallbladder is normal.  There is intrahepatic  periportal edema this is nonspecific but can be associated with  hepatitis.  No focal liver lesion is seen.  The solid abdominal  organs and ureters are otherwise unremarkable.  The GI tract is  unremarkable, including the appendix.  Pelvis: The uterus,  ovaries and urinary bladder are normal.  There is no pelvic or  abdominal ascites, adenopathy or acute osseous abnormality.      Impression:      Mild intrahepatic periportal edema of uncertain etiology or  significance.  This could be due to hepatitis.  Otherwise normal  abdomen and pelvis.    Authenticated by Anselmo Lino M.D. on 12/11/2020 08:17:00 PM    US Pelvis Limited [[id number]] Collected: 12/11/20 1208     Updated: 12/11/20 1211    Narrative:      PROCEDURE: US PELVIS LIMITED-     HISTORY: Menometrorrhagia; K92.2-Gastrointestinal hemorrhage,  unspecified; D64.9-Anemia, unspecified; R10.13-Epigastric pain     PROCEDURE:  Sonographic images of the pelvis were obtained  transabdominally.     FINDINGS: The uterus measures  11.4 x 4.6 x 6.2 cm. The endometrium is  11.4 mm . The ovaries are normal in size with small follicles present..  Both ovaries demonstrate appropriate blood flow.. There is no  significant free fluid.       Impression:      Normal pelvic ultrasound.     This report was finalized on 12/11/2020 12:09 PM by Erick Lu M.D..             Assessment / Plan    Assessment/Recommendations:  1.  Severe symptomatic anemia  2.  Severe iron deficiency anemia  3.     Gastroesophageal reflux disease with erosive esophagitis  4.    Menorrhagia  Patient appears to have a chronic anemia most likely associated with heavy menstrual periods.   EGD done on 12/10/2020 did not reveal any upper GI source of bleeding.  She did have LA grade a esophagitis.  Gastric biopsies and duodenal biopsy reports are pending.  No melena no bright red rectal bleed.  Status post 3 units PRBC transfusion with a stable hemoglobin.  CT scan of the abdomen pelvis done showed mild nonspecific nate hepatis edema.  No signs or lab studies indicating any hepatitis.  Pelvic ultrasound did not reveal any pelvic pathology  Okay to DC home from GI standpoint  Continue Protonix 40 mg p.o. daily  Antireflux diet discussed with the patient and the family  Avoid NSAIDs  Iron pills p.o. twice daily for 3 months  Repeat CBC CMP in 4 weeks time  Consider stool softener if there is any constipation with iron pills  She needs a colonoscopy for further evaluation of her iron deficiency anemia and will schedule the same as an outpatient after follow-up in office.  She needs a GYN consultation as an outpatient    5.   Thrombocytopenia  Etiology is unclear.  No prior values to compare.  Current platelet level 90,000 to 100,000  Repeat CBC in 4 weeks time  If any persistent thrombocytopenia she needs an urgent consultation with hematology    6.  Isolated hyperbilirubinemia  This is most likely secondary to hemolysis from her current menstrual periods.   There is no prior values to compare.  Remote possibility of a Gilbert's syndrome cannot be ruled out.  Her rest of the liver enzymes are all normal  Repeat CMP in 4 weeks time      Thank you very much for letting me participate in the care of this patient.  Please do not hesitate to call me if you have any questions.    Jay Sharma MD  [facility] [place]  12/12/2020  11:12 EST    Please note that portions of this note may have been completed with a voice  recognition program.

## 2020-12-12 NOTE — PROGRESS NOTES
No anesthesia related problems.      [facility]    Nerve Cath Post Op Call    Patient Name: Amanda Beasley  :  1980  MRN:  [id number]  Date of Discharge:     POST OP CALL

## 2020-12-12 NOTE — ADDENDUM NOTE
Addendum  created 12/12/20 0872 by Josh Butler CRNA    Clinical Note Signed, Review and Sign - Ready for Procedure

## 2020-12-12 NOTE — DISCHARGE SUMMARY
[facility]   DISCHARGE SUMMARY      Name:  Amanda Beasley   Age:  40 y.o.  Sex:  female  :  1980  MRN:  [id number]   Visit Number:  [id number]  Primary Care Physician:  Tra Murphy MD  Date of Discharge:  2020  Admission Date:  12/10/2020      Discharge Diagnosis:         Symptomatic anemia    Acute epigastric pain    Acute gastritis    Hyperbilirubinemia    Thrombocytopenia (CMS/HCC)    Menometrorrhagia    Iron deficiency anemia due to chronic blood loss  GERD          Consults:     Consults     Date and Time Order Name Status Description    12/10/2020 1809 Inpatient Gastroenterology Consult Completed           Procedures Performed:      Procedure(s):  ESOPHAGOGASTRODUODENOSCOPY WITH BIOPSY   1333 UPPER GI ENDOSCOPY        Hospital Course:   The patient was admitted on 12/10/2020  Please see H&P for details on admission HPI and ROS.  40-year-old patient was a direct admit because of symptomatic anemia.  She had a hemoglobin of 5.8.  She also had dysphagia and pain after eating consistent with peptic ulcer disease.  After admission she was given 3 blood transfusions and her hemoglobin has come up to 9.6 upon discharge.  She also has mild pancytopenia platelet count has been around below 3000 and her platelet has gradually dropped from 1 30,000-90,000.  There is no overt bleeding.  GI consult was done and Dr. Sharma did EGD.  Findings were for no acute ulcer but a gastritis.  Patient was given IV Protonix and 18 switched to oral Protonix.  She will be continued on Reglan for short course and also iron tablets have been started due to iron deficiency anemia.  Patient is stable for discharge and will need to follow-up with Dr. Victoria for colonoscopy if to be done as outpatient.  Also if her symptoms of mild pancytopenia persist and hematology consult will be done.  Patient also has mild hyperbilirubinemia and a CT scan of the abdomen was done which showed no  gallstones and there is mild intrahepatic periportal edema which is nonspecific.  Patient is stable for discharge and I will see the patient back in 1 week.  Follow-up with GI in 4 weeks for colonoscopy    Vital Signs:     Temp:  [97.2 °F (36.2 °C)-98.6 °F (37 °C)] 98 °F (36.7 °C)  Heart Rate:  [58-82] 67  Resp:  [12-18] 18  BP: (109-137)/(69-90) 126/90    Physical Exam:     General Appearance:    Alert and cooperative, not in any acute distress.   Head:    Atraumatic and normocephalic, without obvious abnormality.   Eyes:            PERRLA,  No pallor. Extraocular movements are within normal limits.   Neck:   Supple,  No lymph glands, no bruit   Lungs:     Chest shape is normal. Breath sounds heard bilaterally equally.  No crackles or wheezing.     Heart:    Normal S1 and S2, no murmur,  No JVD   Abdomen:     Normal bowel sounds, no masses, no organomegaly. Soft     nontender, no guarding, no rebound tenderness   Extremities:   Moves all extremities well, no edema, no cyanosis,    Skin:   No  bruising or rash.   Neurologic:   Grossly nonfocal and moves all extremities.        Pertinent Lab Results:     Results from last 7 days   Lab Units 12/12/20  0857 12/11/20  1058 12/10/20  1840   SODIUM mmol/L 138 140 139   POTASSIUM mmol/L 3.7 3.7 3.3*   CHLORIDE mmol/L 108* 106 103   CO2 mmol/L 20.5* 20.6* 20.6*   BUN mg/dL 6 6 6   CREATININE mg/dL 0.54* 0.63 0.51*   CALCIUM mg/dL 9.1 9.6 9.7   BILIRUBIN mg/dL 2.8* 2.9* 1.3*   ALK PHOS U/L 55 61 63   ALT (SGPT) U/L 6 11 10   AST (SGOT) U/L 18 23 19   GLUCOSE mg/dL 76 87 98     Results from last 7 days   Lab Units 12/12/20  0857 12/11/20  1058 12/10/20  1840   WBC 10*3/mm3 2.86* 3.59 3.11*   HEMOGLOBIN g/dL 9.6* 10.0* 5.8*   HEMATOCRIT % 33.0* 34.6 23.2*   PLATELETS 10*3/mm3 90* 118* 132*         No results found for: BLOODCX, URINECX, WOUNDCX, MRSACX    Pertinent Radiology Results:    Imaging Results (Most Recent)     Procedure Component Value Units Date/Time    CT Abdomen  Pelvis With Contrast [[id number]] Collected: 12/11/20 2017     Updated: 12/11/20 2018    Narrative:      FINAL REPORT    TECHNIQUE:  Routine axial images through the abdomen and pelvis were  obtained following IV and oral contrast administration.    CLINICAL HISTORY:  Severe symptomatic anemia, No obvious GI source of bleeding    FINDINGS:  Abdomen: The gallbladder is normal.  There is intrahepatic  periportal edema this is nonspecific but can be associated with  hepatitis.  No focal liver lesion is seen.  The solid abdominal  organs and ureters are otherwise unremarkable.  The GI tract is  unremarkable, including the appendix.  Pelvis: The uterus,  ovaries and urinary bladder are normal.  There is no pelvic or  abdominal ascites, adenopathy or acute osseous abnormality.      Impression:      Mild intrahepatic periportal edema of uncertain etiology or  significance.  This could be due to hepatitis.  Otherwise normal  abdomen and pelvis.    Authenticated by Anselmo Lino M.D. on 12/11/2020 08:17:00 PM    US Pelvis Limited [[id number]] Collected: 12/11/20 1208     Updated: 12/11/20 1211    Narrative:      PROCEDURE: US PELVIS LIMITED-     HISTORY: Menometrorrhagia; K92.2-Gastrointestinal hemorrhage,  unspecified; D64.9-Anemia, unspecified; R10.13-Epigastric pain     PROCEDURE:  Sonographic images of the pelvis were obtained  transabdominally.     FINDINGS: The uterus measures  11.4 x 4.6 x 6.2 cm. The endometrium is  11.4 mm . The ovaries are normal in size with small follicles present..  Both ovaries demonstrate appropriate blood flow.. There is no  significant free fluid.       Impression:      Normal pelvic ultrasound.     This report was finalized on 12/11/2020 12:09 PM by Erick Lu M.D..                  Discharge Disposition:      Home or Self Care    Discharge Medication:         Discharge Medications      New Medications      Instructions Start Date   ferrous gluconate 324 MG tablet  Commonly known as:  FERGON   324 mg, Oral, Daily With Breakfast      simethicone 80 MG chewable tablet  Commonly known as: MYLICON   80 mg, Oral, 4 Times Daily PRN         Continue These Medications      Instructions Start Date   metoclopramide 10 MG tablet  Commonly known as: REGLAN   10 mg, Oral, 4 Times Daily Before Meals & Nightly      pantoprazole 40 MG EC tablet  Commonly known as: PROTONIX   40 mg, Oral, Daily             Discharge Diet:       Soft diet      Follow-up Appointments:    Follow-up with me to be done within a week  No future appointments.      Test Results Pending at Discharge:      Pending Labs     Order Current Status    Tissue Pathology Exam In process             Tra Murphy MD  12/12/20  10:03 EST    Time:  Discharge 25 min    Please note that portions of this note were completed with a voice recognition program.

## 2020-12-16 LAB
LAB AP CASE REPORT: NORMAL
PATH REPORT.FINAL DX SPEC: NORMAL

## 2021-02-10 ENCOUNTER — OFFICE VISIT (OUTPATIENT)
Dept: GASTROENTEROLOGY | Facility: CLINIC | Age: 41
End: 2021-02-10

## 2021-02-10 VITALS
HEIGHT: 60 IN | WEIGHT: 121 LBS | TEMPERATURE: 96.8 F | SYSTOLIC BLOOD PRESSURE: 138 MMHG | DIASTOLIC BLOOD PRESSURE: 90 MMHG | BODY MASS INDEX: 23.75 KG/M2 | HEART RATE: 83 BPM

## 2021-02-10 DIAGNOSIS — D69.6 ACQUIRED THROMBOCYTOPENIA (HCC): ICD-10-CM

## 2021-02-10 DIAGNOSIS — Z01.812 PRE-PROCEDURE LAB EXAM: Primary | ICD-10-CM

## 2021-02-10 DIAGNOSIS — D50.0 IRON DEFICIENCY ANEMIA DUE TO CHRONIC BLOOD LOSS: Primary | ICD-10-CM

## 2021-02-10 DIAGNOSIS — E80.6 BILIRUBINEMIA: ICD-10-CM

## 2021-02-10 DIAGNOSIS — K21.00 GASTROESOPHAGEAL REFLUX DISEASE WITH ESOPHAGITIS WITHOUT HEMORRHAGE: ICD-10-CM

## 2021-02-10 PROCEDURE — 99214 OFFICE O/P EST MOD 30 MIN: CPT | Performed by: INTERNAL MEDICINE

## 2021-02-10 RX ORDER — BISACODYL 5 MG/1
20 TABLET, DELAYED RELEASE ORAL ONCE
Qty: 4 TABLET | Refills: 0 | Status: SHIPPED | OUTPATIENT
Start: 2021-02-10 | End: 2021-02-10

## 2021-02-10 RX ORDER — OMEPRAZOLE 40 MG/1
40 CAPSULE, DELAYED RELEASE ORAL DAILY
COMMUNITY
End: 2021-05-05 | Stop reason: SDUPTHER

## 2021-02-10 RX ORDER — SODIUM CHLORIDE 9 MG/ML
70 INJECTION, SOLUTION INTRAVENOUS CONTINUOUS PRN
Status: CANCELLED | OUTPATIENT
Start: 2021-03-05

## 2021-02-10 NOTE — PATIENT INSTRUCTIONS
Food Choices for Gastroesophageal Reflux Disease, Adult  When you have gastroesophageal reflux disease (GERD), the foods you eat and your eating habits are very important. Choosing the right foods can help ease the discomfort of GERD. Consider working with a diet and nutrition specialist (dietitian) to help you make healthy food choices.  What general guidelines should I follow?    Eating plan  · Choose healthy foods low in fat, such as fruits, vegetables, whole grains, low-fat dairy products, and lean meat, fish, and poultry.  · Eat frequent, small meals instead of three large meals each day. Eat your meals slowly, in a relaxed setting. Avoid bending over or lying down until 2-3 hours after eating.  · Limit high-fat foods such as fatty meats or fried foods.  · Limit your intake of oils, butter, and shortening to less than 8 teaspoons each day.  · Avoid the following:  ? Foods that cause symptoms. These may be different for different people. Keep a food diary to keep track of foods that cause symptoms.  ? Alcohol.  ? Drinking large amounts of liquid with meals.  ? Eating meals during the 2-3 hours before bed.  · Cook foods using methods other than frying. This may include baking, grilling, or broiling.  Lifestyle  · Maintain a healthy weight. Ask your health care provider what weight is healthy for you. If you need to lose weight, work with your health care provider to do so safely.  · Exercise for at least 30 minutes on 5 or more days each week, or as told by your health care provider.  · Avoid wearing clothes that fit tightly around your waist and chest.  · Do not use any products that contain nicotine or tobacco, such as cigarettes and e-cigarettes. If you need help quitting, ask your health care provider.  · Sleep with the head of your bed raised. Use a wedge under the mattress or blocks under the bed frame to raise the head of the bed.  What foods are not recommended?  The items listed may not be a  complete list. Talk with your dietitian about what dietary choices are best for you.  Grains  Pastries or quick breads with added fat. French toast.  Vegetables  Deep fried vegetables. French fries. Any vegetables prepared with added fat. Any vegetables that cause symptoms. For some people this may include tomatoes and tomato products, chili peppers, onions and garlic, and horseradish.  Fruits  Any fruits prepared with added fat. Any fruits that cause symptoms. For some people this may include citrus fruits, such as oranges, grapefruit, pineapple, and stephenie.  Meats and other protein foods  High-fat meats, such as fatty beef or pork, hot dogs, ribs, ham, sausage, salami and pfeiffer. Fried meat or protein, including fried fish and fried chicken. Nuts and nut butters.  Dairy  Whole milk and chocolate milk. Sour cream. Cream. Ice cream. Cream cheese. Milk shakes.  Beverages  Coffee and tea, with or without caffeine. Carbonated beverages. Sodas. Energy drinks. Fruit juice made with acidic fruits (such as orange or grapefruit). Tomato juice. Alcoholic drinks.  Fats and oils  Butter. Margarine. Shortening. Ghee.  Sweets and desserts  Chocolate and cocoa. Donuts.  Seasoning and other foods  Pepper. Peppermint and spearmint. Any condiments, herbs, or seasonings that cause symptoms. For some people, this may include [place], hot sauce, or vinegar-based salad dressings.  Summary  · When you have gastroesophageal reflux disease (GERD), food and lifestyle choices are very important to help ease the discomfort of GERD.  · Eat frequent, small meals instead of three large meals each day. Eat your meals slowly, in a relaxed setting. Avoid bending over or lying down until 2-3 hours after eating.  · Limit high-fat foods such as fatty meat or fried foods.  This information is not intended to replace advice given to you by your health care provider. Make sure you discuss any questions you have with your health care provider.  Document  Revised: 04/09/2020 Document Reviewed: 12/19/2017  Elsevier Patient Education © 2020 [employer]

## 2021-02-10 NOTE — PROGRESS NOTES
Follow Up Note     Date: 02/10/2021   Patient Name: Amanda Beasley  MRN: [id number]  : 1980     Referring Physician: Tra Murphy MD    Chief Complaint:    Chief Complaint   Patient presents with   • Hospital Follow Up Visit   • GI Bleeding       Interval History:   2/10/2021  Amanda Beasley is a 41 y.o. female who is here today for follow up for her recent severe anemia.  She was recently discharged after being evaluated for her severe anemia with hemoglobin 5 g/dL.  Patient states that since discharge she is doing better.  Very delicate reflux symptoms while on Prilosec.   Has been having regular bowel movement no melena.  Her periods have changed now and she gets very scanty menstrual periods.     2020  She is feeling much better still has some burping and bloating sensation.  Denies any bowel movement since yesterday.  No nausea vomiting.  She had a an endoscopy done on 2020.  No events post procedure.     12/10/2020  This is 40-year-old female patient with a prior history of gastroesophageal reflux disease, chronic anemia, menorrhagia was a sent from PCPs office for severe anemia, upper abdominal pain and reflux reflux symptoms.      Patient states that the she was doing fine until 2 days ago she developed significant reflux symptoms with a epigastric pain and belching.  She felt bloated.  She went to PCPs office and had a prescription for PPI was given blood work was drawn.  Blood work came out today with a severe anemia with a hemoglobin of 5.8 g/dL for which she was been told come to hospital for admission.      Patient currently still states epigastric pain with excessive belching and acid reflux.  She denies any vomiting denies any nausea.  She states that she has been having regular bowel movements without any melena or bright red rectal bleed.  There was no fever chills.  She denies any recent use of NSAIDs however in July she took few days of ibuprofen.      She states  that she had a chronic anemia her hemoglobin was running around 8 to 9 g/dL in the past.  Her PCP was in [place] since July patient is in [place].  She states that she had a heavy menstrual periods in the past.  She used to get a periods twice in the month, frequency got improved slowly however she continued to have a excessive bleeding monthly.  She is currently having periods.     She denies any prior history of any endoscopy or colonoscopy.  No family history of any colon cancer or any GI malignancy.  She denies any prior history peptic ulcer disease.  Subjective      Past Medical History:   Past Medical History:   Diagnosis Date   • GERD (gastroesophageal reflux disease)    • History of blood transfusion      Past Surgical History:   Past Surgical History:   Procedure Laterality Date   • ENDOSCOPY N/A 12/11/2020    Procedure: ESOPHAGOGASTRODUODENOSCOPY WITH BIOPSY;  Surgeon: Jay Sharma MD;  Location: [facility] ENDOSCOPY;  Service: Gastroenterology;  Laterality: N/A;       Family History:   Family History   Problem Relation Age of Onset   • Colon cancer Neg Hx    • Cirrhosis Neg Hx    • Liver cancer Neg Hx    • Liver disease Neg Hx        Social History:   Social History     Socioeconomic History   • Marital status:      Spouse name: Not on file   • Number of children: Not on file   • Years of education: Not on file   • Highest education level: Not on file   Tobacco Use   • Smoking status: Never Smoker   • Smokeless tobacco: Never Used   Substance and Sexual Activity   • Alcohol use: Never     Frequency: Never   • Drug use: Never   • Sexual activity: Defer       Medications:     Current Outpatient Medications:   •  omeprazole (priLOSEC) 40 MG capsule, Take 40 mg by mouth Daily., Disp: , Rfl:     Allergies:   No Known Allergies    Review of Systems:   Review of Systems   Constitutional: Positive for fatigue. Negative for appetite change and unexpected weight loss.   HENT: Negative for trouble  "swallowing.    Gastrointestinal: Positive for indigestion. Negative for abdominal distention, abdominal pain, anal bleeding, blood in stool, constipation, diarrhea, nausea, rectal pain, vomiting and GERD.       The following portions of the patient's history were reviewed and updated as appropriate: allergies, current medications, past family history, past medical history, past social history, past surgical history and problem list.    Objective     Physical Exam:  Vital Signs:   Vitals:    02/10/21 1600   BP: 138/90   Pulse: 83   Temp: 96.8 °F (36 °C)   Weight: 54.9 kg (121 lb)   Height: 152.4 cm (60\")       Physical Exam  Vitals signs and nursing note reviewed.   Constitutional:       Appearance: She is well-developed.   Eyes:      Comments: Mild pallor present   Neck:      Musculoskeletal: Normal range of motion and neck supple.   Cardiovascular:      Rate and Rhythm: Normal rate and regular rhythm.      Heart sounds: No murmur.   Pulmonary:      Effort: Pulmonary effort is normal. No respiratory distress.      Breath sounds: Normal breath sounds.   Abdominal:      General: Bowel sounds are normal. There is no distension.      Palpations: Abdomen is soft. There is no mass.      Tenderness: There is no abdominal tenderness.      Hernia: No hernia is present.   Lymphadenopathy:      Cervical: No cervical adenopathy.   Skin:     General: Skin is warm and dry.   Neurological:      General: No focal deficit present.      Mental Status: She is alert and oriented to person, place, and time.      Sensory: No sensory deficit.         Results Review:   I reviewed the patient's new clinical results.    Admission on 12/10/2020, Discharged on 12/12/2020   Component Date Value Ref Range Status   • WBC 12/10/2020 3.11* 3.40 - 10.80 10*3/mm3 Final   • RBC 12/10/2020 3.50* 3.77 - 5.28 10*6/mm3 Final   • Hemoglobin 12/10/2020 5.8* 12.0 - 15.9 g/dL Final   • Hematocrit 12/10/2020 23.2* 34.0 - 46.6 % Final   • MCV 12/10/2020 66.3* " 79.0 - 97.0 fL Final   • MCH 12/10/2020 16.6* 26.6 - 33.0 pg Final   • MCHC 12/10/2020 25.0* 31.5 - 35.7 g/dL Final   • RDW 12/10/2020 18.1* 12.3 - 15.4 % Final   • RDW-SD 12/10/2020 43.1  37.0 - 54.0 fl Final   • MPV 12/10/2020    Final    TNP   • Platelets 12/10/2020 132* 140 - 450 10*3/mm3 Final   • Glucose 12/10/2020 98  65 - 99 mg/dL Final   • BUN 12/10/2020 6  6 - 20 mg/dL Final   • Creatinine 12/10/2020 0.51* 0.57 - 1.00 mg/dL Final   • Sodium 12/10/2020 139  136 - 145 mmol/L Final   • Potassium 12/10/2020 3.3* 3.5 - 5.2 mmol/L Final   • Chloride 12/10/2020 103  98 - 107 mmol/L Final   • CO2 12/10/2020 20.6* 22.0 - 29.0 mmol/L Final   • Calcium 12/10/2020 9.7  8.6 - 10.5 mg/dL Final   • Total Protein 12/10/2020 7.7  6.0 - 8.5 g/dL Final   • Albumin 12/10/2020 4.70  3.50 - 5.20 g/dL Final   • ALT (SGPT) 12/10/2020 10  1 - 33 U/L Final   • AST (SGOT) 12/10/2020 19  1 - 32 U/L Final   • Alkaline Phosphatase 12/10/2020 63  39 - 117 U/L Final   • Total Bilirubin 12/10/2020 1.3* 0.0 - 1.2 mg/dL Final   • eGFR Non African Amer 12/10/2020 132  >60 mL/min/1.73 Final   • Globulin 12/10/2020 3.0  gm/dL Final   • A/G Ratio 12/10/2020 1.6  g/dL Final   • BUN/Creatinine Ratio 12/10/2020 11.8  7.0 - 25.0 Final   • Anion Gap 12/10/2020 15.4* 5.0 - 15.0 mmol/L Final   • PTT 12/10/2020 30.7  24.5 - 37.2 seconds Final   • ABO Type 12/10/2020 B   Final   • RH type 12/10/2020 Positive   Final   • Antibody Screen 12/10/2020 Negative   Final   • T&S Expiration Date 12/10/2020 12/13/2020 11:59:59 PM   Final   • Product Code 12/12/2020 [id number]   Final   • Unit Number 12/12/2020 [id number]   Final   • UNIT  ABO 12/12/2020 B   Final   • UNIT  RH 12/12/2020 POS   Final   • Crossmatch Interpretation 12/12/2020 Compatible   Final   • Dispense Status 12/12/2020 PT   Final   • Blood Expiration Date 12/12/2020 713864733665   Final   • Blood Type Barcode 12/12/2020 7300   Final   • Product Code 12/12/2020 [id number]   Final   • Unit  Number 12/12/2020 [id number]   Final   • UNIT  ABO 12/12/2020 B   Final   • UNIT  RH 12/12/2020 POS   Final   • Crossmatch Interpretation 12/12/2020 Compatible   Final   • Dispense Status 12/12/2020 PT   Final   • Blood Expiration Date 12/12/2020 202101122359   Final   • Blood Type Barcode 12/12/2020 7300   Final   • Product Code 12/12/2020 [id number]   Final   • Unit Number 12/12/2020 [id number]   Final   • UNIT  ABO 12/12/2020 B   Final   • UNIT  RH 12/12/2020 POS   Final   • Crossmatch Interpretation 12/12/2020 Compatible   Final   • Dispense Status 12/12/2020 PT   Final   • Blood Expiration Date 12/12/2020 202101132359   Final   • Blood Type Barcode 12/12/2020 7300   Final   • ABO Type 12/10/2020 B   Final   • RH type 12/10/2020 Positive   Final   • Iron 12/10/2020 14* 37 - 145 mcg/dL Final   • Iron Saturation 12/10/2020 2* 20 - 50 % Final   • Transferrin 12/10/2020 404* 200 - 360 mg/dL Final   • TIBC 12/10/2020 602* 298 - 536 mcg/dL Final   • QT Interval 12/11/2020 384  ms Final   • QTC Interval 12/11/2020 456  ms Final   • WBC 12/11/2020 3.59  3.40 - 10.80 10*3/mm3 Final   • RBC 12/11/2020 4.65  3.77 - 5.28 10*6/mm3 Final   • Hemoglobin 12/11/2020 10.0* 12.0 - 15.9 g/dL Final   • Hematocrit 12/11/2020 34.6  34.0 - 46.6 % Final   • MCV 12/11/2020 74.4* 79.0 - 97.0 fL Final   • MCH 12/11/2020 21.5* 26.6 - 33.0 pg Final   • MCHC 12/11/2020 28.9* 31.5 - 35.7 g/dL Final   • RDW 12/11/2020 23.2* 12.3 - 15.4 % Final   • RDW-SD 12/11/2020 61.4* 37.0 - 54.0 fl Final   • MPV 12/11/2020 9.4  6.0 - 12.0 fL Final   • Platelets 12/11/2020 118* 140 - 450 10*3/mm3 Final   • Glucose 12/11/2020 87  65 - 99 mg/dL Final   • BUN 12/11/2020 6  6 - 20 mg/dL Final   • Creatinine 12/11/2020 0.63  0.57 - 1.00 mg/dL Final   • Sodium 12/11/2020 140  136 - 145 mmol/L Final   • Potassium 12/11/2020 3.7  3.5 - 5.2 mmol/L Final    Slight hemolysis detected by analyzer. Results may be affected.   • Chloride 12/11/2020 106  98 - 107  mmol/L Final   • CO2 12/11/2020 20.6* 22.0 - 29.0 mmol/L Final   • Calcium 12/11/2020 9.6  8.6 - 10.5 mg/dL Final   • Total Protein 12/11/2020 7.7  6.0 - 8.5 g/dL Final   • Albumin 12/11/2020 4.50  3.50 - 5.20 g/dL Final   • ALT (SGPT) 12/11/2020 11  1 - 33 U/L Final   • AST (SGOT) 12/11/2020 23  1 - 32 U/L Final    Slight hemolysis detected by analyzer. Results may be affected.   • Alkaline Phosphatase 12/11/2020 61  39 - 117 U/L Final   • Total Bilirubin 12/11/2020 2.9* 0.0 - 1.2 mg/dL Final   • eGFR Non African Amer 12/11/2020 104  >60 mL/min/1.73 Final   • Globulin 12/11/2020 3.2  gm/dL Final   • A/G Ratio 12/11/2020 1.4  g/dL Final   • BUN/Creatinine Ratio 12/11/2020 9.5  7.0 - 25.0 Final   • Anion Gap 12/11/2020 13.4  5.0 - 15.0 mmol/L Final   • HCG, Urine QL 12/11/2020 Negative  Negative Final   • COVID19 12/11/2020 Not Detected  Not Detected - Ref. Range Final   • Glucose 12/11/2020 89  70 - 130 mg/dL Final    Serial Number: [id number]:  [id number]   • Case Report 12/11/2020    Final                    Value:Surgical Pathology Report                         Case: [id number]                                  Authorizing Provider:  Jay Sharma MD  Collected:           12/11/2020 01:47 PM          Ordering Location:     [facility]    Received:            12/11/2020 02:17 PM                                 [facility] ENDO                                                                    Pathologist:           Elver Moscoso MD                                                       Specimens:   1) - Small Intestine, Duodenum, DUODENUM BIOPSY FOR ANEMIA                                          2) - Gastric, Body, GASTRIC BIOPSY FOR H.PYLORI                                           • Final Diagnosis 12/11/2020    Final                    Value:This result contains rich text formatting which cannot be displayed here.   • Glucose 12/12/2020 76  65 - 99 mg/dL Final   • BUN 12/12/2020 6  6 -  20 mg/dL Final   • Creatinine 12/12/2020 0.54* 0.57 - 1.00 mg/dL Final   • Sodium 12/12/2020 138  136 - 145 mmol/L Final   • Potassium 12/12/2020 3.7  3.5 - 5.2 mmol/L Final   • Chloride 12/12/2020 108* 98 - 107 mmol/L Final   • CO2 12/12/2020 20.5* 22.0 - 29.0 mmol/L Final   • Calcium 12/12/2020 9.1  8.6 - 10.5 mg/dL Final   • Total Protein 12/12/2020 6.8  6.0 - 8.5 g/dL Final   • Albumin 12/12/2020 3.90  3.50 - 5.20 g/dL Final   • ALT (SGPT) 12/12/2020 6  1 - 33 U/L Final   • AST (SGOT) 12/12/2020 18  1 - 32 U/L Final   • Alkaline Phosphatase 12/12/2020 55  39 - 117 U/L Final   • Total Bilirubin 12/12/2020 2.8* 0.0 - 1.2 mg/dL Final   • eGFR Non African Amer 12/12/2020 124  >60 mL/min/1.73 Final   • Globulin 12/12/2020 2.9  gm/dL Final   • A/G Ratio 12/12/2020 1.3  g/dL Final   • BUN/Creatinine Ratio 12/12/2020 11.1  7.0 - 25.0 Final   • Anion Gap 12/12/2020 9.5  5.0 - 15.0 mmol/L Final   • WBC 12/12/2020 2.86* 3.40 - 10.80 10*3/mm3 Final   • RBC 12/12/2020 4.43  3.77 - 5.28 10*6/mm3 Final   • Hemoglobin 12/12/2020 9.6* 12.0 - 15.9 g/dL Final   • Hematocrit 12/12/2020 33.0* 34.0 - 46.6 % Final   • MCV 12/12/2020 74.5* 79.0 - 97.0 fL Final   • MCH 12/12/2020 21.7* 26.6 - 33.0 pg Final   • MCHC 12/12/2020 29.1* 31.5 - 35.7 g/dL Final   • RDW 12/12/2020 22.7* 12.3 - 15.4 % Final   • RDW-SD 12/12/2020 61.0* 37.0 - 54.0 fl Final   • Platelets 12/12/2020 90* 140 - 450 10*3/mm3 Final      Us Pelvis Limited    Result Date: 12/11/2020  Normal pelvic ultrasound.  This report was finalized on 12/11/2020 12:09 PM by Erick Lu M.D..    Ct Abdomen Pelvis With Contrast    Result Date: 12/11/2020  Mild intrahepatic periportal edema of uncertain etiology or significance.  This could be due to hepatitis.  Otherwise normal abdomen and pelvis. Authenticated by Anselmo Lino M.D. on 12/11/2020 08:17:00 PM      Assessment / Plan      1.  Severe symptomatic anemia  2.  Severe iron deficiency anemia  3.  Gastroesophageal reflux  disease with erosive esophagitis  4.  Menorrhagia  02/10/2021  Likely menorrhagia induced severe symptomatic anemia.  . As per patient she had a blood work done recently at PCPs office and was 10 g/dL.  She was discharged with a hemoglobin of 9.5 to 10 g/dL.   She is not taking her iron pills, instead is taking liquid iron occasionally.  Advised to take iron pills every day  We will get blood work report from PCP and review and recommend further.  Her gastric biopsies did not reveal any H. pylori duodenal biopsies were normal.  Her recent blood work reviewed and her platelets improved to 174.  Liver enzymes normal with normal total bilirubin.  Her transient elevation in total bilirubin during admission could be related to hemolysis from the menstrual bleeding..     Will consider reducing the PPI dose to 20 mg p.o. daily and possibly changing to as needed after 8 more weeks of therapy  Patient needs colonoscopy for further evaluation for iron deficiency anemia    The indications, technique, alternatives and potential risk and complications were discussed with the patient including but not limited to bleeding, bowel perforations, missing lesions and anesthetic complications. The patient understands and wishes to proceed with the procedure and has given their verbal consent. Written patient education information was given to the patient.   The patient will call if they have further questions before procedure.     12/12/2020  Patient appears to have a chronic anemia most likely associated with heavy menstrual periods.   EGD done on 12/10/2020 did not reveal any upper GI source of bleeding.  She did have LA grade a esophagitis.  Gastric biopsies and duodenal biopsy reports are pending.  No melena no bright red rectal bleed.  Status post 3 units PRBC transfusion with a stable hemoglobin.  CT scan of the abdomen pelvis done showed mild nonspecific nate hepatis edema.  No signs or lab studies indicating any hepatitis.   Pelvic ultrasound did not reveal any pelvic pathology    5.   Thrombocytopenia  Her platelets were around 90,000 200,000 on admission 2 months ago.  Platelets improved to 174,000           Follow Up:   No follow-ups on file.    Jay Sharma MD  [facility] [place]    16:03 EST     Please note that portions of this note may have been completed with a voice recognition program.

## 2021-02-12 ENCOUNTER — TELEPHONE (OUTPATIENT)
Dept: GASTROENTEROLOGY | Facility: CLINIC | Age: 41
End: 2021-02-12

## 2021-02-12 RX ORDER — POLYETHYLENE GLYCOL 3350 17 G/17G
238 POWDER, FOR SOLUTION ORAL ONCE
Qty: 238 G | Refills: 0 | Status: SHIPPED | OUTPATIENT
Start: 2021-02-12 | End: 2021-02-12

## 2021-02-15 ENCOUNTER — TELEPHONE (OUTPATIENT)
Dept: GASTROENTEROLOGY | Facility: CLINIC | Age: 41
End: 2021-02-15

## 2021-02-15 NOTE — TELEPHONE ENCOUNTER
----- Message from Jay Sharma MD sent at 2/12/2021  3:39 PM EST -----  Sent a prescription for MiraLAX Gatorade  Need instructions      ----- Message -----  From: Zoraida Lamar MA  Sent: 2/12/2021  12:02 PM EST  To: Jay Sharma MD    [facility] on backorder

## 2021-03-02 ENCOUNTER — LAB (OUTPATIENT)
Dept: LAB | Facility: HOSPITAL | Age: 41
End: 2021-03-02

## 2021-03-02 DIAGNOSIS — Z01.812 PRE-PROCEDURE LAB EXAM: ICD-10-CM

## 2021-03-02 PROCEDURE — C9803 HOPD COVID-19 SPEC COLLECT: HCPCS

## 2021-03-02 PROCEDURE — U0004 COV-19 TEST NON-CDC HGH THRU: HCPCS

## 2021-03-02 NOTE — PRE-PROCEDURE INSTRUCTIONS
PAT phone history completed with pt's daughter for upcoming procedure on 3/5/21 with Dr. Sharma.       PAT PASS GIVEN/REVIEWED WITH PT'S DAUGHTER.  VERBALIZED UNDERSTANDING OF THE FOLLOWING:  DO NOT EAT, DRINK, SMOKE, USE SMOKELESS TOBACCO OR CHEW GUM AFTER MIDNIGHT THE NIGHT BEFORE SURGERY.  THIS ALSO INCLUDES HARD CANDIES AND MINTS.    DO NOT SHAVE THE AREA TO BE OPERATED ON AT LEAST 48 HOURS PRIOR TO THE PROCEDURE.  DO NOT WEAR MAKE UP OR NAIL POLISH.  DO NOT LEAVE IN ANY PIERCING OR WEAR JEWELRY THE DAY OF SURGERY.      DO NOT USE ADHESIVES IF YOU WEAR DENTURES.    DO NOT WEAR EYE CONTACTS; BRING IN YOUR GLASSES.    ONLY TAKE MEDICATION THE MORNING OF YOUR PROCEDURE IF INSTRUCTED BY YOUR SURGEON WITH ENOUGH WATER TO SWALLOW THE MEDICATION.  IF YOUR SURGEON DID NOT SPECIFY WHICH MEDICATIONS TO TAKE, YOU WILL NEED TO CALL THEIR OFFICE FOR FURTHER INSTRUCTIONS AND DO AS THEY INSTRUCT.    LEAVE ANYTHING YOU CONSIDER VALUABLE AT HOME.    YOU WILL NEED TO ARRANGE FOR SOMEONE TO DRIVE YOU HOME AFTER SURGERY.  IT IS RECOMMENDED THAT YOU DO NOT DRIVE, WORK, DRINK ALCOHOL OR MAKE MAJOR DECISIONS FOR AT LEAST 24 HOURS AFTER YOUR PROCEDURE IS COMPLETE.      THE DAY OF YOUR PROCEDURE, BRING IN THE FOLLOWING IF APPLICABLE:   PICTURE ID AND INSURANCE/MEDICARE OR MEDICAID CARDS/ANY CO-PAY THAT MAY BE DUE   COPY OF ADVANCED DIRECTIVE/LIVING WILL/POWER OR    CPAP/BIPAP/INHALERS   SKIN PREP SHEET   YOUR PREADMISSION TESTING PASS (IF NOT A PHONE HISTORY)    COVID self-quarantine instructions reviewed with the pt's daughter.  Verbalized understanding.

## 2021-03-03 LAB — SARS-COV-2 RNA RESP QL NAA+PROBE: NOT DETECTED

## 2021-03-05 ENCOUNTER — ANESTHESIA (OUTPATIENT)
Dept: GASTROENTEROLOGY | Facility: HOSPITAL | Age: 41
End: 2021-03-05

## 2021-03-05 ENCOUNTER — HOSPITAL ENCOUNTER (OUTPATIENT)
Facility: HOSPITAL | Age: 41
Setting detail: HOSPITAL OUTPATIENT SURGERY
Discharge: HOME | End: 2021-03-05
Attending: INTERNAL MEDICINE | Admitting: INTERNAL MEDICINE

## 2021-03-05 ENCOUNTER — ANESTHESIA EVENT (OUTPATIENT)
Dept: GASTROENTEROLOGY | Facility: HOSPITAL | Age: 41
End: 2021-03-05

## 2021-03-05 VITALS
RESPIRATION RATE: 16 BRPM | WEIGHT: 121 LBS | OXYGEN SATURATION: 100 % | HEART RATE: 66 BPM | SYSTOLIC BLOOD PRESSURE: 119 MMHG | DIASTOLIC BLOOD PRESSURE: 82 MMHG | TEMPERATURE: 98.2 F | HEIGHT: 60 IN | BODY MASS INDEX: 23.75 KG/M2

## 2021-03-05 DIAGNOSIS — D50.0 IRON DEFICIENCY ANEMIA DUE TO CHRONIC BLOOD LOSS: ICD-10-CM

## 2021-03-05 LAB
B-HCG UR QL: NEGATIVE
INTERNAL NEGATIVE CONTROL: NEGATIVE
INTERNAL POSITIVE CONTROL: POSITIVE
Lab: NORMAL

## 2021-03-05 PROCEDURE — 25010000002 PROPOFOL 200 MG/20ML EMULSION: Performed by: NURSE ANESTHETIST, CERTIFIED REGISTERED

## 2021-03-05 PROCEDURE — 81025 URINE PREGNANCY TEST: CPT | Performed by: INTERNAL MEDICINE

## 2021-03-05 PROCEDURE — 45380 COLONOSCOPY AND BIOPSY: CPT | Performed by: INTERNAL MEDICINE

## 2021-03-05 PROCEDURE — 25010000002 FENTANYL CITRATE (PF) 100 MCG/2ML SOLUTION: Performed by: NURSE ANESTHETIST, CERTIFIED REGISTERED

## 2021-03-05 RX ORDER — SODIUM CHLORIDE 0.9 % (FLUSH) 0.9 %
10 SYRINGE (ML) INJECTION AS NEEDED
Status: DISCONTINUED | OUTPATIENT
Start: 2021-03-05 | End: 2021-03-05 | Stop reason: HOSPADM

## 2021-03-05 RX ORDER — MAGNESIUM HYDROXIDE 1200 MG/15ML
LIQUID ORAL AS NEEDED
Status: DISCONTINUED | OUTPATIENT
Start: 2021-03-05 | End: 2021-03-05 | Stop reason: HOSPADM

## 2021-03-05 RX ORDER — FENTANYL CITRATE 50 UG/ML
INJECTION, SOLUTION INTRAMUSCULAR; INTRAVENOUS AS NEEDED
Status: DISCONTINUED | OUTPATIENT
Start: 2021-03-05 | End: 2021-03-05 | Stop reason: SURG

## 2021-03-05 RX ORDER — PROPOFOL 10 MG/ML
INJECTION, EMULSION INTRAVENOUS AS NEEDED
Status: DISCONTINUED | OUTPATIENT
Start: 2021-03-05 | End: 2021-03-05 | Stop reason: SURG

## 2021-03-05 RX ORDER — SIMETHICONE 20 MG/.3ML
EMULSION ORAL AS NEEDED
Status: DISCONTINUED | OUTPATIENT
Start: 2021-03-05 | End: 2021-03-05 | Stop reason: HOSPADM

## 2021-03-05 RX ORDER — SODIUM CHLORIDE 9 MG/ML
70 INJECTION, SOLUTION INTRAVENOUS CONTINUOUS PRN
Status: DISCONTINUED | OUTPATIENT
Start: 2021-03-05 | End: 2021-03-05 | Stop reason: HOSPADM

## 2021-03-05 RX ORDER — LIDOCAINE HYDROCHLORIDE 20 MG/ML
INJECTION, SOLUTION INTRAVENOUS AS NEEDED
Status: DISCONTINUED | OUTPATIENT
Start: 2021-03-05 | End: 2021-03-05 | Stop reason: SURG

## 2021-03-05 RX ADMIN — FENTANYL CITRATE 100 MCG: 50 INJECTION, SOLUTION INTRAMUSCULAR; INTRAVENOUS at 11:40

## 2021-03-05 RX ADMIN — LIDOCAINE HYDROCHLORIDE 100 MG: 20 INJECTION, SOLUTION INTRAVENOUS at 11:40

## 2021-03-05 RX ADMIN — PROPOFOL 50 MG: 10 INJECTION, EMULSION INTRAVENOUS at 11:40

## 2021-03-05 RX ADMIN — PROPOFOL 50 MG: 10 INJECTION, EMULSION INTRAVENOUS at 11:52

## 2021-03-05 RX ADMIN — PROPOFOL 50 MG: 10 INJECTION, EMULSION INTRAVENOUS at 11:47

## 2021-03-05 NOTE — ANESTHESIA POSTPROCEDURE EVALUATION
Patient: Amadna Beasley    Procedure Summary     Date: 03/05/21 Room / Location: [facility] ENDOSCOPY 2 / [facility] ENDOSCOPY    Anesthesia Start: 1140 Anesthesia Stop: 1209    Procedure: COLONOSCOPY WITH BIOPSY (N/A Anus) Diagnosis:       Iron deficiency anemia due to chronic blood loss      (Iron deficiency anemia due to chronic blood loss [D50.0])    Surgeon: Jay Sharma MD Provider: Levi Tolbert CRNA    Anesthesia Type: MAC ASA Status: 3          Anesthesia Type: MAC    Vitals  Vitals Value Taken Time   /82 03/05/21 1236   Temp 98.2 °F (36.8 °C) 03/05/21 1206   Pulse 66 03/05/21 1236   Resp 16 03/05/21 1236   SpO2 100 % 03/05/21 1236           Post Anesthesia Care and Evaluation    Patient location during evaluation: bedside  Patient participation: complete - patient participated  Level of consciousness: awake  Pain score: 0  Pain management: adequate  Airway patency: patent  Anesthetic complications: No anesthetic complications  PONV Status: controlled  Cardiovascular status: acceptable and stable  Respiratory status: acceptable and room air  Hydration status: acceptable

## 2021-03-05 NOTE — SIGNIFICANT NOTE
Patient declined interpretation services/devices; utilized daughter for communication per patient request

## 2021-03-05 NOTE — DISCHARGE INSTRUCTIONS
- Discharge patient to home (ambulatory).   - High fiber diet.   - Continue present medications.   - Await pathology results.   - Repeat colonoscopy in 10 years for screening purposes.   - Return to GI office in 8 weeks.

## 2021-03-05 NOTE — ANESTHESIA PREPROCEDURE EVALUATION
Anesthesia Evaluation     Patient summary reviewed and Nursing notes reviewed   no history of anesthetic complications:  NPO Solid Status: > 8 hours  NPO Liquid Status: > 8 hours           Airway   Mallampati: I  TM distance: >3 FB  Neck ROM: full  no difficulty expected  Dental - normal exam     Pulmonary - negative pulmonary ROS and normal exam   (-) not a smoker  Cardiovascular - negative cardio ROS and normal exam  Exercise tolerance: good (4-7 METS)        Neuro/Psych- negative ROS  GI/Hepatic/Renal/Endo    (+)  GERD, GI bleeding upper ,     Musculoskeletal (-) negative ROS    Abdominal    Substance History - negative use     OB/GYN negative ob/gyn ROS         Other   blood dyscrasia anemia,       Other Comment: hgb 5.8  Transfused 3 units 12/11.                     Anesthesia Plan    ASA 3     MAC   (Risks and benefits discussed including risk of aspiration, recall and dental damage. All patient questions answered via .    Will continue with plan of care.)  intravenous induction     Anesthetic plan, all risks, benefits, and alternatives have been provided, discussed and informed consent has been obtained with: patient.

## 2021-03-09 LAB
LAB AP CASE REPORT: NORMAL
PATH REPORT.FINAL DX SPEC: NORMAL

## 2021-03-24 ENCOUNTER — IMMUNIZATION (OUTPATIENT)
Dept: VACCINE CLINIC | Facility: HOSPITAL | Age: 41
End: 2021-03-24

## 2021-03-24 PROCEDURE — 91300 HC SARSCOV02 VAC 30MCG/0.3ML IM: CPT | Performed by: INTERNAL MEDICINE

## 2021-03-24 PROCEDURE — 0001A: CPT | Performed by: INTERNAL MEDICINE

## 2021-04-14 ENCOUNTER — IMMUNIZATION (OUTPATIENT)
Dept: VACCINE CLINIC | Facility: HOSPITAL | Age: 41
End: 2021-04-14

## 2021-04-14 PROCEDURE — 0002A: CPT | Performed by: INTERNAL MEDICINE

## 2021-04-14 PROCEDURE — 91300 HC SARSCOV02 VAC 30MCG/0.3ML IM: CPT | Performed by: INTERNAL MEDICINE

## 2021-05-05 ENCOUNTER — LAB (OUTPATIENT)
Dept: LAB | Facility: HOSPITAL | Age: 41
End: 2021-05-05

## 2021-05-05 ENCOUNTER — OFFICE VISIT (OUTPATIENT)
Dept: GASTROENTEROLOGY | Facility: CLINIC | Age: 41
End: 2021-05-05

## 2021-05-05 VITALS
HEIGHT: 60 IN | WEIGHT: 120 LBS | DIASTOLIC BLOOD PRESSURE: 82 MMHG | BODY MASS INDEX: 23.56 KG/M2 | SYSTOLIC BLOOD PRESSURE: 118 MMHG | TEMPERATURE: 97.1 F

## 2021-05-05 DIAGNOSIS — D50.0 IRON DEFICIENCY ANEMIA DUE TO CHRONIC BLOOD LOSS: ICD-10-CM

## 2021-05-05 DIAGNOSIS — D50.0 IRON DEFICIENCY ANEMIA DUE TO CHRONIC BLOOD LOSS: Primary | ICD-10-CM

## 2021-05-05 LAB
ALBUMIN SERPL-MCNC: 4.1 G/DL (ref 3.5–5.2)
ALBUMIN/GLOB SERPL: 1.6 G/DL
ALP SERPL-CCNC: 61 U/L (ref 39–117)
ALT SERPL W P-5'-P-CCNC: 11 U/L (ref 1–33)
ANION GAP SERPL CALCULATED.3IONS-SCNC: 10.9 MMOL/L (ref 5–15)
AST SERPL-CCNC: 19 U/L (ref 1–32)
BASOPHILS # BLD AUTO: 0.02 10*3/MM3 (ref 0–0.2)
BASOPHILS NFR BLD AUTO: 0.6 % (ref 0–1.5)
BILIRUB SERPL-MCNC: 0.7 MG/DL (ref 0–1.2)
BUN SERPL-MCNC: 6 MG/DL (ref 6–20)
BUN/CREAT SERPL: 11.8 (ref 7–25)
CALCIUM SPEC-SCNC: 9 MG/DL (ref 8.6–10.5)
CHLORIDE SERPL-SCNC: 107 MMOL/L (ref 98–107)
CO2 SERPL-SCNC: 23.1 MMOL/L (ref 22–29)
CREAT SERPL-MCNC: 0.51 MG/DL (ref 0.57–1)
DEPRECATED RDW RBC AUTO: 34.7 FL (ref 37–54)
EOSINOPHIL # BLD AUTO: 0.05 10*3/MM3 (ref 0–0.4)
EOSINOPHIL NFR BLD AUTO: 1.6 % (ref 0.3–6.2)
ERYTHROCYTE [DISTWIDTH] IN BLOOD BY AUTOMATED COUNT: 12 % (ref 12.3–15.4)
GFR SERPL CREATININE-BSD FRML MDRD: 132 ML/MIN/1.73
GLOBULIN UR ELPH-MCNC: 2.5 GM/DL
GLUCOSE SERPL-MCNC: 114 MG/DL (ref 65–99)
HCT VFR BLD AUTO: 27.9 % (ref 34–46.6)
HGB BLD-MCNC: 8.7 G/DL (ref 12–15.9)
IMM GRANULOCYTES # BLD AUTO: 0.01 10*3/MM3 (ref 0–0.05)
IMM GRANULOCYTES NFR BLD AUTO: 0.3 % (ref 0–0.5)
LYMPHOCYTES # BLD AUTO: 0.73 10*3/MM3 (ref 0.7–3.1)
LYMPHOCYTES NFR BLD AUTO: 23 % (ref 19.6–45.3)
MCH RBC QN AUTO: 25.2 PG (ref 26.6–33)
MCHC RBC AUTO-ENTMCNC: 31.2 G/DL (ref 31.5–35.7)
MCV RBC AUTO: 80.9 FL (ref 79–97)
MONOCYTES # BLD AUTO: 0.23 10*3/MM3 (ref 0.1–0.9)
MONOCYTES NFR BLD AUTO: 7.2 % (ref 5–12)
NEUTROPHILS NFR BLD AUTO: 2.14 10*3/MM3 (ref 1.7–7)
NEUTROPHILS NFR BLD AUTO: 67.3 % (ref 42.7–76)
NRBC BLD AUTO-RTO: 0 /100 WBC (ref 0–0.2)
PLATELET # BLD AUTO: 190 10*3/MM3 (ref 140–450)
PMV BLD AUTO: 10.9 FL (ref 6–12)
POTASSIUM SERPL-SCNC: 3.6 MMOL/L (ref 3.5–5.2)
PROT SERPL-MCNC: 6.6 G/DL (ref 6–8.5)
RBC # BLD AUTO: 3.45 10*6/MM3 (ref 3.77–5.28)
SODIUM SERPL-SCNC: 141 MMOL/L (ref 136–145)
WBC # BLD AUTO: 3.18 10*3/MM3 (ref 3.4–10.8)

## 2021-05-05 PROCEDURE — 99213 OFFICE O/P EST LOW 20 MIN: CPT | Performed by: INTERNAL MEDICINE

## 2021-05-05 PROCEDURE — 85025 COMPLETE CBC W/AUTO DIFF WBC: CPT

## 2021-05-05 PROCEDURE — 36415 COLL VENOUS BLD VENIPUNCTURE: CPT

## 2021-05-05 PROCEDURE — 80053 COMPREHEN METABOLIC PANEL: CPT

## 2021-05-05 RX ORDER — OMEPRAZOLE 40 MG/1
40 CAPSULE, DELAYED RELEASE ORAL DAILY PRN
Qty: 30 CAPSULE | Refills: 1 | Status: SHIPPED | OUTPATIENT
Start: 2021-05-05 | End: 2021-11-08

## 2021-05-05 NOTE — PROGRESS NOTES
Follow Up Note     Date: 2021   Patient Name: Amanda Beasley  MRN: [id number]  : 1980     Referring Physician: Tra Murphy MD    Chief Complaint:    Chief Complaint   Patient presents with   • Follow-up   • Anemia       Interval History:   2021  Amanda Beasley is a 41 y.o. female who is here today for follow up for her ongoing anemia.  She states that she still gets frequent irregular vaginal bleeding.  Recently after she had a Covid vaccine she had an excessive bleeding twice an operative 10 days time.  Associated lower abdominal pain and back pain.  No rectal bleeding no melena no nausea vomiting.  She is not taking her iron pills now    2/10/2021  Amanda Beasley is a 41 y.o. female who is here today for follow up for her recent severe anemia.  She was recently discharged after being evaluated for her severe anemia with hemoglobin 5 g/dL.  Patient states that since discharge she is doing better.  Very delicate reflux symptoms while on Prilosec.   Has been having regular bowel movement no melena.  Her periods have changed now and she gets very scanty menstrual periods.      2020  She is feeling much better still has some burping and bloating sensation.  Denies any bowel movement since yesterday.  No nausea vomiting.  She had a an endoscopy done on 2020.  No events post procedure.     12/10/2020  This is 40-year-old female patient with a prior history of gastroesophageal reflux disease, chronic anemia, menorrhagia was a sent from PCPs office for severe anemia, upper abdominal pain and reflux reflux symptoms.      Patient states that the she was doing fine until 2 days ago she developed significant reflux symptoms with a epigastric pain and belching.  She felt bloated.  She went to PCPs office and had a prescription for PPI was given blood work was drawn.  Blood work came out today with a severe anemia with a hemoglobin of 5.8 g/dL for which she was been told come to  hospital for admission.      Patient currently still states epigastric pain with excessive belching and acid reflux.  She denies any vomiting denies any nausea.  She states that she has been having regular bowel movements without any melena or bright red rectal bleed.  There was no fever chills.  She denies any recent use of NSAIDs however in July she took few days of ibuprofen.      She states that she had a chronic anemia her hemoglobin was running around 8 to 9 g/dL in the past.  Her PCP was in [place] since July patient is in [place].  She states that she had a heavy menstrual periods in the past.  She used to get a periods twice in the month, frequency got improved slowly however she continued to have a excessive bleeding monthly.  She is currently having periods.     She denies any prior history of any endoscopy or colonoscopy.  No family history of any colon cancer or any GI malignancy.  She denies any prior history peptic ulcer disease.    Subjective      Past Medical History:   Past Medical History:   Diagnosis Date   • Body piercing     both ears   • GERD (gastroesophageal reflux disease)    • History of blood transfusion     no reaction   • Wears glasses      Past Surgical History:   Past Surgical History:   Procedure Laterality Date   • COLONOSCOPY N/A 3/5/2021    Procedure: COLONOSCOPY WITH BIOPSY;  Surgeon: Jay Sharma MD;  Location: [facility] ENDOSCOPY;  Service: Gastroenterology;  Laterality: N/A;   • ENDOSCOPY N/A 12/11/2020    Procedure: ESOPHAGOGASTRODUODENOSCOPY WITH BIOPSY;  Surgeon: Jay Sharma MD;  Location: [facility] ENDOSCOPY;  Service: Gastroenterology;  Laterality: N/A;       Family History:   Family History   Problem Relation Age of Onset   • Colon cancer Neg Hx    • Cirrhosis Neg Hx    • Liver cancer Neg Hx    • Liver disease Neg Hx        Social History:   Social History     Socioeconomic History   • Marital status:      Spouse name: Not on file   • Number of  "children: Not on file   • Years of education: Not on file   • Highest education level: Not on file   Tobacco Use   • Smoking status: Never Smoker   • Smokeless tobacco: Never Used   Vaping Use   • Vaping Use: Never used   Substance and Sexual Activity   • Alcohol use: Never   • Drug use: Never   • Sexual activity: Defer       Medications:     Current Outpatient Medications:   •  omeprazole (priLOSEC) 40 MG capsule, Take 40 mg by mouth Daily., Disp: , Rfl:     Allergies:   No Known Allergies    Review of Systems:   Review of Systems   Constitutional: Negative for appetite change, fatigue and unexpected weight loss.   Gastrointestinal: Negative for abdominal distention, abdominal pain, anal bleeding, blood in stool, constipation, diarrhea, nausea, rectal pain, vomiting, GERD and indigestion.   Genitourinary: Positive for vaginal bleeding.   Musculoskeletal: Positive for back pain.       The following portions of the patient's history were reviewed and updated as appropriate: allergies, current medications, past family history, past medical history, past social history, past surgical history and problem list.    Objective     Physical Exam:  Vital Signs:   Vitals:    05/05/21 1330   BP: 118/82   Temp: 97.1 °F (36.2 °C)   TempSrc: Temporal   Weight: 54.4 kg (120 lb)   Height: 152.4 cm (60\")       Physical Exam  Vitals and nursing note reviewed.   Constitutional:       Appearance: She is well-developed.   Eyes:      Conjunctiva/sclera: Conjunctivae normal.   Cardiovascular:      Rate and Rhythm: Normal rate and regular rhythm.      Heart sounds: No murmur heard.     Pulmonary:      Effort: Pulmonary effort is normal. No respiratory distress.      Breath sounds: Normal breath sounds.   Abdominal:      General: Bowel sounds are normal. There is no distension.      Palpations: Abdomen is soft. There is no mass.      Tenderness: There is no abdominal tenderness.      Hernia: No hernia is present.   Musculoskeletal:      " Cervical back: Normal range of motion and neck supple.   Lymphadenopathy:      Cervical: No cervical adenopathy.   Skin:     General: Skin is warm and dry.   Neurological:      General: No focal deficit present.      Mental Status: She is alert and oriented to person, place, and time.      Sensory: No sensory deficit.         Results Review:   I reviewed the patient's new clinical results.    Admission on 03/05/2021, Discharged on 03/05/2021   Component Date Value Ref Range Status   • HCG, Urine, QL 03/05/2021 Negative  Negative Final   • Lot Number 03/05/2021 [id number]   Final   • Internal Positive Control 03/05/2021 Positive   Final   • Internal Negative Control 03/05/2021 Negative   Final   • Case Report 03/05/2021    Final                    Value:Surgical Pathology Report                         Case: [id number]                                  Authorizing Provider:  Jay Sharma MD  Collected:           03/05/2021 11:53 AM          Ordering Location:     [facility]    Received:            03/05/2021 02:06 PM                                 [facility]                                                                    Pathologist:           Marcel Boone MD                                                       Specimen:    Small Intestine, Ileum, TI biopsy                                                         • Final Diagnosis 03/05/2021    Final                    Value:This result contains rich text formatting which cannot be displayed here.   Lab on 03/02/2021   Component Date Value Ref Range Status   • SARS-CoV-2 DENNIS 03/02/2021 Not Detected  Not Detected Final      No radiology results for the last 90 days.    Assessment / Plan      1.  Symptomatic anemia  2.  Severe iron deficiency anemia  3.  Gastroesophageal reflux disease without erosive esophagitis  4.  Menorrhagia  5/5/2021  Her anemia is most likely secondary to menorrhagia and some competent of nutritional anemia cannot be  ruled out.   She had a colonoscopy done on 3/5/2021 which was unremarkable except small internal hemorrhoids.  Terminal ileum was normal.  Ileal biopsies were unremarkable.  EGD done in December 2020 not reveal any significant source of upper GI bleed.  Duodenal and gastric biopsies were normal.  CT scan of the abdomen pelvis and the pelvic ultrasound done in 2020 were normal.   Her hemoglobin and the platelets significantly improved since December 2020.   CBC, CMP now  Given her persistent irregular periods and menorrhagia and anemia will refer to GYN for further recommendation  Given her thrombocytopenia noted in 2020, will also get an opinion from hematology  If her hemoglobin is low we will start her back on iron pills  No significant reflux symptoms now we will continue Prilosec 40 mg as needed for now  Follow-up in 6 months time    02/10/2021  Likely menorrhagia induced severe symptomatic anemia.  . As per patient she had a blood work done recently at PCPs office and was 10 g/dL.  She was discharged with a hemoglobin of 9.5 to 10 g/dL.   She is not taking her iron pills, instead is taking liquid iron occasionally.  Advised to take iron pills every day  We will get blood work report from PCP and review and recommend further.  Her gastric biopsies did not reveal any H. pylori duodenal biopsies were normal.  Her recent blood work reviewed and her platelets improved to 174.  Liver enzymes normal with normal total bilirubin.  Her transient elevation in total bilirubin during admission could be related to hemolysis from the menstrual bleeding..      Will consider reducing the PPI dose to 20 mg p.o. daily and possibly changing to as needed after 8 more weeks of therapy  Patient needs colonoscopy for further evaluation for iron deficiency anemia     12/12/2020  Patient appears to have a chronic anemia most likely associated with heavy menstrual periods.   EGD done on 12/10/2020 did not reveal any upper GI source of  bleeding.  She did have LA grade a esophagitis.  Gastric biopsies and duodenal biopsy reports are pending.  No melena no bright red rectal bleed.  Status post 3 units PRBC transfusion with a stable hemoglobin.  CT scan of the abdomen pelvis done showed mild nonspecific nate hepatis edema.  No signs or lab studies indicating any hepatitis.  Pelvic ultrasound did not reveal any pelvic pathology     5.   Thrombocytopenia  5/5/2021  Her platelets normalized now.  Unclear whether patient had any chronic thrombocytopenia as there is no prior blood work to compare.    We will repeat the CBC today    2/10/2021   her platelets were around 90,000 200,000 on admission 2 months ago.  Platelets improved to 174,000      Follow Up:   No follow-ups on file.    Jay Sharma MD  [facility] [place]  5/5/2021  13:33 EDT     Please note that portions of this note may have been completed with a voice recognition program.

## 2021-05-11 ENCOUNTER — TELEPHONE (OUTPATIENT)
Dept: GASTROENTEROLOGY | Facility: CLINIC | Age: 41
End: 2021-05-11

## 2021-05-11 NOTE — TELEPHONE ENCOUNTER
Called her daughter Laverne and discussed the lab reports with a hemoglobin of 8.7  Advised to start back on iron pills 1 tablet at least p.o. daily  Advised to follow-up with GYN and hematology

## 2021-05-18 ENCOUNTER — CONSULT (OUTPATIENT)
Dept: ONCOLOGY | Facility: CLINIC | Age: 41
End: 2021-05-18

## 2021-05-18 ENCOUNTER — LAB (OUTPATIENT)
Dept: LAB | Facility: HOSPITAL | Age: 41
End: 2021-05-18

## 2021-05-18 VITALS
DIASTOLIC BLOOD PRESSURE: 76 MMHG | SYSTOLIC BLOOD PRESSURE: 125 MMHG | BODY MASS INDEX: 23.16 KG/M2 | OXYGEN SATURATION: 96 % | HEIGHT: 60 IN | HEART RATE: 80 BPM | TEMPERATURE: 97.1 F | RESPIRATION RATE: 16 BRPM | WEIGHT: 118 LBS

## 2021-05-18 DIAGNOSIS — K90.9 IRON MALABSORPTION: ICD-10-CM

## 2021-05-18 DIAGNOSIS — D50.0 IRON DEFICIENCY ANEMIA DUE TO CHRONIC BLOOD LOSS: ICD-10-CM

## 2021-05-18 DIAGNOSIS — D50.0 IRON DEFICIENCY ANEMIA DUE TO CHRONIC BLOOD LOSS: Primary | ICD-10-CM

## 2021-05-18 LAB
BASOPHILS # BLD AUTO: 0.02 10*3/MM3 (ref 0–0.2)
BASOPHILS NFR BLD AUTO: 0.7 % (ref 0–1.5)
DEPRECATED RDW RBC AUTO: 38 FL (ref 37–54)
EOSINOPHIL # BLD AUTO: 0.03 10*3/MM3 (ref 0–0.4)
EOSINOPHIL NFR BLD AUTO: 1 % (ref 0.3–6.2)
ERYTHROCYTE [DISTWIDTH] IN BLOOD BY AUTOMATED COUNT: 12.6 % (ref 12.3–15.4)
FERRITIN SERPL-MCNC: 3.78 NG/ML (ref 13–150)
FOLATE SERPL-MCNC: 6.3 NG/ML (ref 4.78–24.2)
HCT VFR BLD AUTO: 28.6 % (ref 34–46.6)
HGB BLD-MCNC: 8.3 G/DL (ref 12–15.9)
IMM GRANULOCYTES # BLD AUTO: 0 10*3/MM3 (ref 0–0.05)
IMM GRANULOCYTES NFR BLD AUTO: 0 % (ref 0–0.5)
IRON 24H UR-MRATE: 19 MCG/DL (ref 37–145)
IRON SATN MFR SERPL: 4 % (ref 20–50)
LYMPHOCYTES # BLD AUTO: 0.83 10*3/MM3 (ref 0.7–3.1)
LYMPHOCYTES NFR BLD AUTO: 28.6 % (ref 19.6–45.3)
MCH RBC QN AUTO: 24 PG (ref 26.6–33)
MCHC RBC AUTO-ENTMCNC: 29 G/DL (ref 31.5–35.7)
MCV RBC AUTO: 82.7 FL (ref 79–97)
MONOCYTES # BLD AUTO: 0.21 10*3/MM3 (ref 0.1–0.9)
MONOCYTES NFR BLD AUTO: 7.2 % (ref 5–12)
NEUTROPHILS NFR BLD AUTO: 1.81 10*3/MM3 (ref 1.7–7)
NEUTROPHILS NFR BLD AUTO: 62.5 % (ref 42.7–76)
NRBC BLD AUTO-RTO: 0 /100 WBC (ref 0–0.2)
PLATELET # BLD AUTO: 214 10*3/MM3 (ref 140–450)
PMV BLD AUTO: 10.4 FL (ref 6–12)
RBC # BLD AUTO: 3.46 10*6/MM3 (ref 3.77–5.28)
TIBC SERPL-MCNC: 538 MCG/DL (ref 298–536)
TRANSFERRIN SERPL-MCNC: 361 MG/DL (ref 200–360)
VIT B12 BLD-MCNC: 203 PG/ML (ref 211–946)
WBC # BLD AUTO: 2.9 10*3/MM3 (ref 3.4–10.8)

## 2021-05-18 PROCEDURE — 83540 ASSAY OF IRON: CPT

## 2021-05-18 PROCEDURE — 99204 OFFICE O/P NEW MOD 45 MIN: CPT | Performed by: INTERNAL MEDICINE

## 2021-05-18 PROCEDURE — 82607 VITAMIN B-12: CPT

## 2021-05-18 PROCEDURE — 82728 ASSAY OF FERRITIN: CPT

## 2021-05-18 PROCEDURE — 84466 ASSAY OF TRANSFERRIN: CPT

## 2021-05-18 PROCEDURE — 85025 COMPLETE CBC W/AUTO DIFF WBC: CPT

## 2021-05-18 PROCEDURE — 36415 COLL VENOUS BLD VENIPUNCTURE: CPT

## 2021-05-18 PROCEDURE — 82746 ASSAY OF FOLIC ACID SERUM: CPT

## 2021-05-18 RX ORDER — DIPHENHYDRAMINE HCL 25 MG
25 CAPSULE ORAL ONCE
Status: CANCELLED | OUTPATIENT
Start: 2021-06-04

## 2021-05-18 RX ORDER — DIPHENHYDRAMINE HCL 25 MG
25 CAPSULE ORAL ONCE
Status: CANCELLED | OUTPATIENT
Start: 2021-06-11

## 2021-05-18 RX ORDER — SODIUM CHLORIDE 9 MG/ML
250 INJECTION, SOLUTION INTRAVENOUS ONCE
Status: CANCELLED | OUTPATIENT
Start: 2021-06-11

## 2021-05-18 RX ORDER — FAMOTIDINE 10 MG/ML
20 INJECTION, SOLUTION INTRAVENOUS ONCE
Status: CANCELLED | OUTPATIENT
Start: 2021-06-04

## 2021-05-18 RX ORDER — FAMOTIDINE 10 MG/ML
20 INJECTION, SOLUTION INTRAVENOUS ONCE
Status: CANCELLED | OUTPATIENT
Start: 2021-06-11

## 2021-05-18 RX ORDER — SODIUM CHLORIDE 9 MG/ML
250 INJECTION, SOLUTION INTRAVENOUS ONCE
Status: CANCELLED | OUTPATIENT
Start: 2021-06-04

## 2021-05-18 NOTE — PROGRESS NOTES
DATE OF CONSULTATION: 5/18/2021    REFERRING PHYSICIAN: Tra Murphy MD    Dear Tra Gastelum MD  Thank you for asking for my medical advice on this patient. I saw her in the  [place] office on 5/18/2021    REASON FOR CONSULTATION: Normocytic anemia    HISTORY OF PRESENT ILLNESS: The patient is a very pleasant 41 y.o.  female   who was in her usual state of health until December 2020.  Patient present with generalized fatigue.  This has been getting gradually worse.  Associated with shortness of breath.  Acute worse with activity and improves with rest.  Never had this problem before.  She noticed polymenorrhagia and she was having menstrual cycle every 2 weeks.  She had blood work done that revealed hemoglobin of 5.9.  She was treated with 3 units of packed red blood cells.  GI evaluation including EGD and colonoscopy done December and January 2021 negative for active bleeding.  The patient was referred to me for further recommendations.    SUBJECTIVE: When I saw the patient today she is here with her daughter.  She is feeling slightly better.  She has been taking oral iron but stopped it secondary to multiple GI side effects including abdominal pain and constipation.  She continues to have polymenorrhagia.    Review of Systems   Constitutional: Negative for activity change, appetite change, chills, fatigue, fever and unexpected weight change.   HENT: Negative for hearing loss, mouth sores, nosebleeds, sore throat and trouble swallowing.    Eyes: Negative for visual disturbance.   Respiratory: Negative for cough, chest tightness, shortness of breath and wheezing.    Cardiovascular: Negative for chest pain, palpitations and leg swelling.   Gastrointestinal: Negative for abdominal distention, abdominal pain, blood in stool, constipation, diarrhea, nausea, rectal pain and vomiting.   Endocrine: Negative for cold intolerance and heat intolerance.   Genitourinary: Negative for difficulty urinating, dysuria,  frequency and urgency.   Musculoskeletal: Negative for arthralgias, back pain, gait problem, joint swelling and myalgias.   Skin: Negative for rash.   Neurological: Negative for dizziness, tremors, syncope, weakness, light-headedness, numbness and headaches.   Hematological: Negative for adenopathy. Does not bruise/bleed easily.   Psychiatric/Behavioral: Negative for confusion, sleep disturbance and suicidal ideas. The patient is not nervous/anxious.        Past Medical History:   Diagnosis Date   • Body piercing     both ears   • GERD (gastroesophageal reflux disease)    • History of blood transfusion     no reaction   • Wears glasses        Social History     Socioeconomic History   • Marital status:      Spouse name: Not on file   • Number of children: Not on file   • Years of education: Not on file   • Highest education level: Not on file   Tobacco Use   • Smoking status: Never Smoker   • Smokeless tobacco: Never Used   Vaping Use   • Vaping Use: Never used   Substance and Sexual Activity   • Alcohol use: Never   • Drug use: Never   • Sexual activity: Defer       Family History   Problem Relation Age of Onset   • Colon cancer Neg Hx    • Cirrhosis Neg Hx    • Liver cancer Neg Hx    • Liver disease Neg Hx        Past Surgical History:   Procedure Laterality Date   • COLONOSCOPY N/A 3/5/2021    Procedure: COLONOSCOPY WITH BIOPSY;  Surgeon: Jay Sharma MD;  Location: [facility] ENDOSCOPY;  Service: Gastroenterology;  Laterality: N/A;   • ENDOSCOPY N/A 12/11/2020    Procedure: ESOPHAGOGASTRODUODENOSCOPY WITH BIOPSY;  Surgeon: Jay Sharma MD;  Location: [facility] ENDOSCOPY;  Service: Gastroenterology;  Laterality: N/A;       No Known Allergies       Current Outpatient Medications:   •  omeprazole (priLOSEC) 40 MG capsule, Take 1 capsule by mouth Daily As Needed (Reflux symptoms)., Disp: 30 capsule, Rfl: 1    PHYSICAL EXAMINATION:   /76   Pulse 80   Temp 97.1 °F (36.2 °C) (Temporal)    "Resp 16   Ht 152.4 cm (60\")   Wt 53.5 kg (118 lb)   SpO2 96%   BMI 23.05 kg/m²   Pain Score    05/18/21 1135   PainSc: 0-No pain                   ECOG Performance Status: 2 - Symptomatic, <50% confined to bed  General Appearance:  alert, cooperative, no apparent distress and appears stated age   Neurologic/Psychiatric: A&O x 3, gait steady, appropriate affect, strength 5/5 in all muscle groups   HEENT:  Normocephalic, without obvious abnormality, mucous membranes moist   Neck: Supple, symmetrical, trachea midline, no adenopathy;  No thyromegaly, masses, or tenderness   Lungs:   Clear to auscultation bilaterally; respirations regular, even, and unlabored bilaterally   Heart:  Regular rate and rhythm, no murmurs appreciated   Abdomen:   Soft, non-tender, non-distended and no organomegaly   Lymph nodes: No cervical, supraclavicular, inguinal or axillary adenopathy noted   Extremities: Normal, atraumatic; no clubbing, cyanosis, or edema    Skin: No rashes, ulcers, or suspicious lesions noted       Lab on 05/05/2021   Component Date Value Ref Range Status   • Glucose 05/05/2021 114* 65 - 99 mg/dL Final   • BUN 05/05/2021 6  6 - 20 mg/dL Final   • Creatinine 05/05/2021 0.51* 0.57 - 1.00 mg/dL Final   • Sodium 05/05/2021 141  136 - 145 mmol/L Final   • Potassium 05/05/2021 3.6  3.5 - 5.2 mmol/L Final    Slight hemolysis detected by analyzer. Results may be affected.   • Chloride 05/05/2021 107  98 - 107 mmol/L Final   • CO2 05/05/2021 23.1  22.0 - 29.0 mmol/L Final   • Calcium 05/05/2021 9.0  8.6 - 10.5 mg/dL Final   • Total Protein 05/05/2021 6.6  6.0 - 8.5 g/dL Final   • Albumin 05/05/2021 4.10  3.50 - 5.20 g/dL Final   • ALT (SGPT) 05/05/2021 11  1 - 33 U/L Final   • AST (SGOT) 05/05/2021 19  1 - 32 U/L Final   • Alkaline Phosphatase 05/05/2021 61  39 - 117 U/L Final   • Total Bilirubin 05/05/2021 0.7  0.0 - 1.2 mg/dL Final   • eGFR Non African Amer 05/05/2021 132  >60 mL/min/1.73 Final   • Globulin 05/05/2021 2.5  " gm/dL Final   • A/G Ratio 05/05/2021 1.6  g/dL Final   • BUN/Creatinine Ratio 05/05/2021 11.8  7.0 - 25.0 Final   • Anion Gap 05/05/2021 10.9  5.0 - 15.0 mmol/L Final   • WBC 05/05/2021 3.18* 3.40 - 10.80 10*3/mm3 Final   • RBC 05/05/2021 3.45* 3.77 - 5.28 10*6/mm3 Final   • Hemoglobin 05/05/2021 8.7* 12.0 - 15.9 g/dL Final   • Hematocrit 05/05/2021 27.9* 34.0 - 46.6 % Final   • MCV 05/05/2021 80.9  79.0 - 97.0 fL Final   • MCH 05/05/2021 25.2* 26.6 - 33.0 pg Final   • MCHC 05/05/2021 31.2* 31.5 - 35.7 g/dL Final   • RDW 05/05/2021 12.0* 12.3 - 15.4 % Final   • RDW-SD 05/05/2021 34.7* 37.0 - 54.0 fl Final   • MPV 05/05/2021 10.9  6.0 - 12.0 fL Final   • Platelets 05/05/2021 190  140 - 450 10*3/mm3 Final   • Neutrophil % 05/05/2021 67.3  42.7 - 76.0 % Final   • Lymphocyte % 05/05/2021 23.0  19.6 - 45.3 % Final   • Monocyte % 05/05/2021 7.2  5.0 - 12.0 % Final   • Eosinophil % 05/05/2021 1.6  0.3 - 6.2 % Final   • Basophil % 05/05/2021 0.6  0.0 - 1.5 % Final   • Immature Grans % 05/05/2021 0.3  0.0 - 0.5 % Final   • Neutrophils, Absolute 05/05/2021 2.14  1.70 - 7.00 10*3/mm3 Final   • Lymphocytes, Absolute 05/05/2021 0.73  0.70 - 3.10 10*3/mm3 Final   • Monocytes, Absolute 05/05/2021 0.23  0.10 - 0.90 10*3/mm3 Final   • Eosinophils, Absolute 05/05/2021 0.05  0.00 - 0.40 10*3/mm3 Final   • Basophils, Absolute 05/05/2021 0.02  0.00 - 0.20 10*3/mm3 Final   • Immature Grans, Absolute 05/05/2021 0.01  0.00 - 0.05 10*3/mm3 Final   • nRBC 05/05/2021 0.0  0.0 - 0.2 /100 WBC Final        No results found.      DIAGNOSTIC DATA:   1. Radiology:    FINAL REPORT     TECHNIQUE:  Routine axial images through the abdomen and pelvis were  obtained following IV and oral contrast administration.     CLINICAL HISTORY:  Severe symptomatic anemia, No obvious GI source of bleeding     FINDINGS:  Abdomen: The gallbladder is normal.  There is intrahepatic  periportal edema this is nonspecific but can be associated with  hepatitis.  No focal  liver lesion is seen.  The solid abdominal  organs and ureters are otherwise unremarkable.  The GI tract is  unremarkable, including the appendix.  Pelvis: The uterus,  ovaries and urinary bladder are normal.  There is no pelvic or  abdominal ascites, adenopathy or acute osseous abnormality.     IMPRESSION:  Mild intrahepatic periportal edema of uncertain etiology or  significance.  This could be due to hepatitis.  Otherwise normal  abdomen and pelvis.     Authenticated by Anselmo Lino M.D. on 12/11/2020 08:17:00 PM    2. Dr. Murphy's note reviewed by me and documented in the  chart.   3. Pathology report: Negative for malignancy from EGD done January 2021  4. Laboratory data: As above    ASSESSMENT: The patient is a very pleasant 41 y.o.  female  with normocytic anemia    PROBLEM LIST:   1.  Normocytic anemia  2.  Iron deficiency:  A.  Negative GI evaluation EGD December 11, 2020 and colonoscopy March 5, 2020  3.  Polymenorrhagia  4.  Heartburn    PLAN:   1. I had a long discussion today with the patient and her daughter about her  new diagnosis of anemia. I did go over the final pathology report in  detail with both of them. I reviewed the patient's documents including refereing provider's notes, lab results, imaging, and path report.   2.  I explained to the patient that there are 2 possibilities for anemia either underproduction versus increased peripheral destruction/loss.  3.  The patient is vegan and she is at risk for vitamin B12 deficiency which can lead to macrocytic anemia from underproduction.  The patient also has polymenorrhagia which can lead to chronic iron deficiency.  4.  In order to narrow down the differential diagnosis I will check the patient blood work today including CBC iron profile serum ferritin folic acid and vitamin B12.  5.  The patient will be treated with 2 dose of IV iron using Injectafer 750 mg 1 week apart for ferritin less than 50 or saturation less than 15%.  6.  The patient will be  treated with vitamin B12 1000 mcg IM weekly x4 then monthly after that for vitamin B12 level less than 300.  7.  She will follow-up with me in 3 months repeat CBC and iron profile.  8.  I will refer the patient to Dr. Frost for gynecology evaluation regarding her polymenorrhagia.  9.  Today I discussed with patient her blood work results from December 10, 2020.  I explained to her that she had severe iron deficiency her iron saturation was 2% and serum iron was low at 14%.  10.  I will monitor for treatment induced toxicity with IV Injectafer.  The patient be at risk for infusion reaction as well as anaphylaxis.  I will premedicate each dose with Benadryl as well as Pepcid.  11.  I will continue Prilosec daily for heartburn    Kenneyd Yanes MD  5/18/2021

## 2021-05-19 ENCOUNTER — TELEPHONE (OUTPATIENT)
Dept: ONCOLOGY | Facility: CLINIC | Age: 41
End: 2021-05-19

## 2021-05-19 DIAGNOSIS — D50.0 IRON DEFICIENCY ANEMIA DUE TO CHRONIC BLOOD LOSS: ICD-10-CM

## 2021-05-19 DIAGNOSIS — E53.8 VITAMIN B 12 DEFICIENCY: Primary | ICD-10-CM

## 2021-05-19 RX ORDER — CYANOCOBALAMIN 1000 UG/ML
1000 INJECTION, SOLUTION INTRAMUSCULAR; SUBCUTANEOUS ONCE
Status: CANCELLED | OUTPATIENT
Start: 2021-06-04

## 2021-05-19 RX ORDER — CYANOCOBALAMIN 1000 UG/ML
1000 INJECTION, SOLUTION INTRAMUSCULAR; SUBCUTANEOUS ONCE
Status: CANCELLED | OUTPATIENT
Start: 2021-06-11

## 2021-05-19 RX ORDER — CYANOCOBALAMIN 1000 UG/ML
1000 INJECTION, SOLUTION INTRAMUSCULAR; SUBCUTANEOUS ONCE
Status: CANCELLED | OUTPATIENT
Start: 2021-06-18

## 2021-05-19 RX ORDER — CYANOCOBALAMIN 1000 UG/ML
1000 INJECTION, SOLUTION INTRAMUSCULAR; SUBCUTANEOUS ONCE
Status: CANCELLED | OUTPATIENT
Start: 2021-06-26

## 2021-05-19 NOTE — TELEPHONE ENCOUNTER
Called and relayed information to Laverne patients daughter about need for iv iron and b12.  Discussed with Te at [facility] who will move appointments up earlier and notify/mail appts to them as well.

## 2021-05-19 NOTE — TELEPHONE ENCOUNTER
Patient daughter called back and gave her lab results.  She would prefer earlier appt.  Did not mail letter since able to speak with them.  Te will reach out with new appointments.

## 2021-05-19 NOTE — TELEPHONE ENCOUNTER
----- Message from Te Park, PCT sent at 5/19/2021  8:43 AM EDT -----  Regarding: RE: IV iron    June 4TH AT 11AM AND June 11TH AT 1:30  ----- Message -----  From: Sumi Kang RN  Sent: 5/18/2021   4:00 PM EDT  To: Sumi Kang RN, Te Park, PCT  Subject: IV iron                                          Patient needs 2 doses IV iron.  I can't get her to answer.  Will you  schedule the iv iron and let me know when you have it scheduled and I will mail appts with a letter about her labs in it.  Maybe schedule in 2 weeks so I can make sure she gets it in time.     ThanksSumi  ----- Message -----  From: Sumi Kang RN  Sent: 5/18/2021  12:15 PM EDT  To: Sumi Kang RN    F/u on iron studies

## 2021-05-19 NOTE — TELEPHONE ENCOUNTER
Called to discuss with patient.  Unable to leave message and no answer on her phone.  Tried her daughter adrienne Galloway at [phone] and left message that she needs 2 doses IV iron and weekly b12 for one month, then monthly thereafter.  Careplan dropped.  Message sent to teofilo to try to reach daughter about appts and do sooner if she can reach.  Also advised will need interpretor at her appt.  I also mailed a letter to patient with first appointment and that if they called In we may be able to do her appointment earlier.

## 2021-05-19 NOTE — TELEPHONE ENCOUNTER
Caller: PT     Relationship: SELF    Best call back number: [phone]    Caller requesting test results: YES     What test was performed: LABS    When was the test performed: 05/18/21    Where was the test performed:  [facility]    Additional notes: PT STATES RECEIVED A CALL ABOUT HER LABS BUT MISSED THE CALL

## 2021-06-04 ENCOUNTER — HOSPITAL ENCOUNTER (OUTPATIENT)
Dept: INFUSION THERAPY | Facility: HOSPITAL | Age: 41
Setting detail: INFUSION SERIES
Discharge: HOME | End: 2021-06-04

## 2021-06-04 VITALS
RESPIRATION RATE: 18 BRPM | DIASTOLIC BLOOD PRESSURE: 82 MMHG | SYSTOLIC BLOOD PRESSURE: 125 MMHG | TEMPERATURE: 98 F | HEART RATE: 73 BPM | OXYGEN SATURATION: 99 %

## 2021-06-04 DIAGNOSIS — E53.8 VITAMIN B 12 DEFICIENCY: ICD-10-CM

## 2021-06-04 DIAGNOSIS — K90.9 IRON MALABSORPTION: Primary | ICD-10-CM

## 2021-06-04 DIAGNOSIS — D50.0 IRON DEFICIENCY ANEMIA DUE TO CHRONIC BLOOD LOSS: ICD-10-CM

## 2021-06-04 PROCEDURE — 63710000001 DIPHENHYDRAMINE PER 50 MG: Performed by: INTERNAL MEDICINE

## 2021-06-04 PROCEDURE — 96365 THER/PROPH/DIAG IV INF INIT: CPT

## 2021-06-04 PROCEDURE — 96372 THER/PROPH/DIAG INJ SC/IM: CPT

## 2021-06-04 PROCEDURE — 25010000002 CYANOCOBALAMIN PER 1000 MCG: Performed by: INTERNAL MEDICINE

## 2021-06-04 PROCEDURE — 25010000002 FERRIC CARBOXYMALTOSE 750 MG/15ML SOLUTION 15 ML VIAL: Performed by: INTERNAL MEDICINE

## 2021-06-04 PROCEDURE — 96375 TX/PRO/DX INJ NEW DRUG ADDON: CPT

## 2021-06-04 RX ORDER — DIPHENHYDRAMINE HCL 25 MG
25 CAPSULE ORAL ONCE
Status: COMPLETED | OUTPATIENT
Start: 2021-06-04 | End: 2021-06-04

## 2021-06-04 RX ORDER — SODIUM CHLORIDE 9 MG/ML
250 INJECTION, SOLUTION INTRAVENOUS ONCE
Status: DISCONTINUED | OUTPATIENT
Start: 2021-06-04 | End: 2021-06-06 | Stop reason: HOSPADM

## 2021-06-04 RX ORDER — CYANOCOBALAMIN 1000 UG/ML
1000 INJECTION, SOLUTION INTRAMUSCULAR; SUBCUTANEOUS ONCE
Status: COMPLETED | OUTPATIENT
Start: 2021-06-04 | End: 2021-06-04

## 2021-06-04 RX ORDER — FAMOTIDINE 10 MG/ML
20 INJECTION, SOLUTION INTRAVENOUS ONCE
Status: COMPLETED | OUTPATIENT
Start: 2021-06-04 | End: 2021-06-04

## 2021-06-04 RX ADMIN — DIPHENHYDRAMINE HYDROCHLORIDE 25 MG: 25 CAPSULE ORAL at 11:39

## 2021-06-04 RX ADMIN — FAMOTIDINE 20 MG: 10 INJECTION INTRAVENOUS at 11:40

## 2021-06-04 RX ADMIN — FERRIC CARBOXYMALTOSE INJECTION 750 MG: 50 INJECTION, SOLUTION INTRAVENOUS at 12:02

## 2021-06-04 RX ADMIN — CYANOCOBALAMIN 1000 MCG: 1000 INJECTION, SOLUTION INTRAMUSCULAR at 11:42

## 2021-06-11 ENCOUNTER — INFUSION (OUTPATIENT)
Dept: ONCOLOGY | Facility: HOSPITAL | Age: 41
End: 2021-06-11

## 2021-06-11 VITALS
SYSTOLIC BLOOD PRESSURE: 121 MMHG | TEMPERATURE: 97.9 F | DIASTOLIC BLOOD PRESSURE: 84 MMHG | HEART RATE: 73 BPM | RESPIRATION RATE: 16 BRPM

## 2021-06-11 DIAGNOSIS — D50.0 IRON DEFICIENCY ANEMIA DUE TO CHRONIC BLOOD LOSS: ICD-10-CM

## 2021-06-11 DIAGNOSIS — E53.8 VITAMIN B 12 DEFICIENCY: ICD-10-CM

## 2021-06-11 DIAGNOSIS — K90.9 IRON MALABSORPTION: Primary | ICD-10-CM

## 2021-06-11 PROCEDURE — 25010000002 CYANOCOBALAMIN PER 1000 MCG: Performed by: INTERNAL MEDICINE

## 2021-06-11 PROCEDURE — 96372 THER/PROPH/DIAG INJ SC/IM: CPT

## 2021-06-11 PROCEDURE — 63710000001 DIPHENHYDRAMINE PER 50 MG: Performed by: INTERNAL MEDICINE

## 2021-06-11 PROCEDURE — 96375 TX/PRO/DX INJ NEW DRUG ADDON: CPT

## 2021-06-11 PROCEDURE — 96365 THER/PROPH/DIAG IV INF INIT: CPT

## 2021-06-11 PROCEDURE — 25010000002 FERRIC CARBOXYMALTOSE 750 MG/15ML SOLUTION 15 ML VIAL: Performed by: INTERNAL MEDICINE

## 2021-06-11 RX ORDER — CYANOCOBALAMIN 1000 UG/ML
1000 INJECTION, SOLUTION INTRAMUSCULAR; SUBCUTANEOUS ONCE
Status: COMPLETED | OUTPATIENT
Start: 2021-06-11 | End: 2021-06-11

## 2021-06-11 RX ORDER — FAMOTIDINE 10 MG/ML
20 INJECTION, SOLUTION INTRAVENOUS ONCE
Status: COMPLETED | OUTPATIENT
Start: 2021-06-11 | End: 2021-06-11

## 2021-06-11 RX ORDER — DIPHENHYDRAMINE HCL 25 MG
25 CAPSULE ORAL ONCE
Status: COMPLETED | OUTPATIENT
Start: 2021-06-11 | End: 2021-06-11

## 2021-06-11 RX ORDER — SODIUM CHLORIDE 9 MG/ML
250 INJECTION, SOLUTION INTRAVENOUS ONCE
Status: DISCONTINUED | OUTPATIENT
Start: 2021-06-11 | End: 2021-06-11 | Stop reason: HOSPADM

## 2021-06-11 RX ADMIN — FAMOTIDINE 20 MG: 10 INJECTION INTRAVENOUS at 14:16

## 2021-06-11 RX ADMIN — CYANOCOBALAMIN 1000 MCG: 1000 INJECTION, SOLUTION INTRAMUSCULAR at 14:49

## 2021-06-11 RX ADMIN — FERRIC CARBOXYMALTOSE INJECTION 750 MG: 50 INJECTION, SOLUTION INTRAVENOUS at 14:33

## 2021-06-11 RX ADMIN — DIPHENHYDRAMINE HYDROCHLORIDE 25 MG: 25 CAPSULE ORAL at 14:16

## 2021-06-25 ENCOUNTER — INFUSION (OUTPATIENT)
Dept: ONCOLOGY | Facility: HOSPITAL | Age: 41
End: 2021-06-25

## 2021-06-25 DIAGNOSIS — D50.0 IRON DEFICIENCY ANEMIA DUE TO CHRONIC BLOOD LOSS: ICD-10-CM

## 2021-06-25 DIAGNOSIS — E53.8 VITAMIN B 12 DEFICIENCY: Primary | ICD-10-CM

## 2021-06-25 PROCEDURE — 25010000002 CYANOCOBALAMIN PER 1000 MCG: Performed by: INTERNAL MEDICINE

## 2021-06-25 PROCEDURE — 96372 THER/PROPH/DIAG INJ SC/IM: CPT

## 2021-06-25 RX ORDER — CYANOCOBALAMIN 1000 UG/ML
1000 INJECTION, SOLUTION INTRAMUSCULAR; SUBCUTANEOUS ONCE
Status: COMPLETED | OUTPATIENT
Start: 2021-06-25 | End: 2021-06-25

## 2021-06-25 RX ADMIN — CYANOCOBALAMIN 1000 MCG: 1000 INJECTION, SOLUTION INTRAMUSCULAR at 14:38

## 2021-07-02 ENCOUNTER — HOSPITAL ENCOUNTER (OUTPATIENT)
Dept: INFUSION THERAPY | Facility: HOSPITAL | Age: 41
Setting detail: INFUSION SERIES
Discharge: HOME | End: 2021-07-02

## 2021-07-02 VITALS
OXYGEN SATURATION: 100 % | DIASTOLIC BLOOD PRESSURE: 90 MMHG | TEMPERATURE: 97.7 F | RESPIRATION RATE: 16 BRPM | HEART RATE: 75 BPM | SYSTOLIC BLOOD PRESSURE: 136 MMHG

## 2021-07-02 DIAGNOSIS — D50.0 IRON DEFICIENCY ANEMIA DUE TO CHRONIC BLOOD LOSS: ICD-10-CM

## 2021-07-02 DIAGNOSIS — E53.8 VITAMIN B 12 DEFICIENCY: Primary | ICD-10-CM

## 2021-07-02 PROCEDURE — 96372 THER/PROPH/DIAG INJ SC/IM: CPT

## 2021-07-02 PROCEDURE — 25010000002 CYANOCOBALAMIN PER 1000 MCG: Performed by: INTERNAL MEDICINE

## 2021-07-02 RX ORDER — CYANOCOBALAMIN 1000 UG/ML
1000 INJECTION, SOLUTION INTRAMUSCULAR; SUBCUTANEOUS ONCE
Status: COMPLETED | OUTPATIENT
Start: 2021-07-02 | End: 2021-07-02

## 2021-07-02 RX ADMIN — CYANOCOBALAMIN 1000 MCG: 1000 INJECTION, SOLUTION INTRAMUSCULAR at 14:59

## 2021-07-07 ENCOUNTER — TELEPHONE (OUTPATIENT)
Dept: ONCOLOGY | Facility: CLINIC | Age: 41
End: 2021-07-07

## 2021-07-07 NOTE — TELEPHONE ENCOUNTER
----- Message from Kelly Palomino sent at 7/7/2021  9:44 AM EDT -----  Regarding: RE: referal for GYN  Patient daughter called back and will call to set up appt for pt at [facility]. Thank you.   ----- Message -----  From: Sumi Kang RN  Sent: 7/7/2021   8:54 AM EDT  To: Kelly Palomino  Subject: RE: referal for GYN                              It looks like the daughter had called about this on the original message.  Have you all tried to call the daughter?   ----- Message -----  From: Kelly Palomino  Sent: 7/7/2021   8:17 AM EDT  To: Sumi Kang RN  Subject: RE: referal for GYN                              Looked into this referral and [facility] has attempted calling patient 4x's and have been unsuccessful in scheduling because patient phone is busy and no voicemail. In the referral it states for the patient to call [phone] to schedule.   ----- Message -----  From: Sumi Kang RN  Sent: 7/2/2021   3:14 PM EDT  To: Samanta Astorga RN, [facility]  Subject: RE: referal for GYN                              Yes - the referral is already in - I will copy front offic regine Feliciano on this to f/u with patient.   ----- Message -----  From: Samanta Astorga RN  Sent: 7/2/2021   3:03 PM EDT  To: Sumi Kang RN  Subject: referal for GYN                                  Hey!  The daughter of this pt was asking about a referral to see a gynecologist. Was  supposed to put one in for the pt?

## 2021-07-19 ENCOUNTER — LAB (OUTPATIENT)
Dept: LAB | Facility: HOSPITAL | Age: 41
End: 2021-07-19

## 2021-07-19 ENCOUNTER — OFFICE VISIT (OUTPATIENT)
Dept: OBSTETRICS AND GYNECOLOGY | Facility: CLINIC | Age: 41
End: 2021-07-19

## 2021-07-19 VITALS
DIASTOLIC BLOOD PRESSURE: 90 MMHG | BODY MASS INDEX: 21.51 KG/M2 | SYSTOLIC BLOOD PRESSURE: 120 MMHG | WEIGHT: 121.4 LBS | HEIGHT: 63 IN

## 2021-07-19 DIAGNOSIS — N92.0 MENORRHAGIA WITH REGULAR CYCLE: Primary | ICD-10-CM

## 2021-07-19 DIAGNOSIS — D50.0 IRON DEFICIENCY ANEMIA DUE TO CHRONIC BLOOD LOSS: ICD-10-CM

## 2021-07-19 DIAGNOSIS — Z12.31 ENCOUNTER FOR SCREENING MAMMOGRAM FOR MALIGNANT NEOPLASM OF BREAST: ICD-10-CM

## 2021-07-19 LAB
ESTRADIOL SERPL HS-MCNC: 133 PG/ML
FERRITIN SERPL-MCNC: 126 NG/ML (ref 13–150)
FOLATE SERPL-MCNC: 5.87 NG/ML (ref 4.78–24.2)
FSH SERPL-ACNC: 24.2 MIU/ML
IRON 24H UR-MRATE: 132 MCG/DL (ref 37–145)
IRON SATN MFR SERPL: 36 % (ref 20–50)
TIBC SERPL-MCNC: 367 MCG/DL (ref 298–536)
TRANSFERRIN SERPL-MCNC: 246 MG/DL (ref 200–360)
TSH SERPL DL<=0.05 MIU/L-ACNC: 2.63 UIU/ML (ref 0.27–4.2)
VIT B12 BLD-MCNC: 449 PG/ML (ref 211–946)

## 2021-07-19 PROCEDURE — 36415 COLL VENOUS BLD VENIPUNCTURE: CPT | Performed by: PHYSICIAN ASSISTANT

## 2021-07-19 PROCEDURE — 82670 ASSAY OF TOTAL ESTRADIOL: CPT | Performed by: PHYSICIAN ASSISTANT

## 2021-07-19 PROCEDURE — 99203 OFFICE O/P NEW LOW 30 MIN: CPT | Performed by: PHYSICIAN ASSISTANT

## 2021-07-19 PROCEDURE — 83540 ASSAY OF IRON: CPT

## 2021-07-19 PROCEDURE — 82607 VITAMIN B-12: CPT

## 2021-07-19 PROCEDURE — 84443 ASSAY THYROID STIM HORMONE: CPT | Performed by: PHYSICIAN ASSISTANT

## 2021-07-19 PROCEDURE — 82746 ASSAY OF FOLIC ACID SERUM: CPT

## 2021-07-19 PROCEDURE — 82728 ASSAY OF FERRITIN: CPT

## 2021-07-19 PROCEDURE — 83001 ASSAY OF GONADOTROPIN (FSH): CPT | Performed by: PHYSICIAN ASSISTANT

## 2021-07-19 PROCEDURE — 84466 ASSAY OF TRANSFERRIN: CPT

## 2021-07-19 NOTE — PATIENT INSTRUCTIONS
Will check labs today  RTO for pap, possible endometrial biopsy and pelvic ultrasound. Pending results patient desires hysterectomy.

## 2021-07-19 NOTE — PROGRESS NOTES
Subjective   Chief Complaint   Patient presents with   • Menstrual Problem     Patient is C/O irregular, heavy periods.  Patient has a history of a blood transfusion and anemia       Amanda Beasley is a 41 y.o. year old  presenting to be seen for heavy menstrual cycles.  Her daughter is here today to translate.  Patient is from [place] and has not been in the [place] that long.  She reports her periods have been heavy for the last 5 to 6 years.  She had been having regular monthly cycles with a 7-day bleed, however when she had her Covid vaccine in March and April her periods became irregular for a few months.  Her last 2 cycles have gone back to a monthly regular cycle but still very heavy lasting 7 days.  Her LMP was 2021.  Reports heavy flow last the first 2 days of her menstrual cycle.  She is not using any birth control.  She had a blood transfusion in 2020 due to anemia.  She has had a GI work-up to rule out any GI causes of anemia as well.  Patient has never had a Pap smear stating that Pap smears are not done in [facility].  Patient is desiring hysterectomy for a definitive treatment. Would like to know if she is close to menopause  Has never had a screening mammogram    Past Medical History:   Diagnosis Date   • Anemia    • Body piercing     both ears   • GERD (gastroesophageal reflux disease)    • History of blood transfusion     no reaction   • Wears glasses         Current Outpatient Medications:   •  omeprazole (priLOSEC) 40 MG capsule, Take 1 capsule by mouth Daily As Needed (Reflux symptoms)., Disp: 30 capsule, Rfl: 1   No Known Allergies   Past Surgical History:   Procedure Laterality Date   • COLONOSCOPY N/A 3/5/2021    Procedure: COLONOSCOPY WITH BIOPSY;  Surgeon: Jay Sharma MD;  Location: [facility] ENDOSCOPY;  Service: Gastroenterology;  Laterality: N/A;   • ENDOSCOPY N/A 2020    Procedure: ESOPHAGOGASTRODUODENOSCOPY WITH BIOPSY;  Surgeon: Jay Sharma MD;   "Location: [facility] ENDOSCOPY;  Service: Gastroenterology;  Laterality: N/A;      Social History     Socioeconomic History   • Marital status:      Spouse name: Not on file   • Number of children: Not on file   • Years of education: Not on file   • Highest education level: Not on file   Tobacco Use   • Smoking status: Never Smoker   • Smokeless tobacco: Never Used   Vaping Use   • Vaping Use: Never used   Substance and Sexual Activity   • Alcohol use: Never   • Drug use: Never   • Sexual activity: Not Currently     Birth control/protection: None      Family History   Problem Relation Age of Onset   • No Known Problems Father    • No Known Problems Mother    • Colon cancer Neg Hx    • Cirrhosis Neg Hx    • Liver cancer Neg Hx    • Liver disease Neg Hx        Review of Systems   Constitutional: Positive for fatigue. Negative for chills, diaphoresis and fever.   Gastrointestinal: Negative for abdominal pain, constipation, diarrhea, nausea and vomiting.   Genitourinary: Positive for menstrual problem. Negative for difficulty urinating, dysuria and pelvic pain.           Objective   /90   Ht 160 cm (63\")   Wt 55.1 kg (121 lb 6.4 oz)   LMP 07/08/2021 (Exact Date)   Breastfeeding No   BMI 21.51 kg/m²     Physical Exam  Constitutional:       Appearance: Normal appearance. She is well-developed and well-groomed.   Eyes:      General: Lids are normal.      Extraocular Movements: Extraocular movements intact.      Conjunctiva/sclera: Conjunctivae normal.   Abdominal:      Palpations: Abdomen is soft. There is no hepatomegaly or splenomegaly.      Tenderness: There is no abdominal tenderness. There is no guarding.   Genitourinary:     Labia:         Right: No rash, tenderness or lesion.         Left: No rash, tenderness or lesion.       Vagina: No vaginal discharge, erythema, tenderness, bleeding or lesions.      Cervix: No cervical motion tenderness, discharge, friability, lesion or cervical bleeding.      " Uterus: Not enlarged and not tender.       Adnexa:         Right: No mass or tenderness.          Left: No mass or tenderness.     Skin:     General: Skin is warm and dry.      Findings: No bruising or lesion.   Neurological:      Mental Status: She is alert.   Psychiatric:         Attention and Perception: Attention normal.         Mood and Affect: Mood normal.         Speech: Speech normal.         Behavior: Behavior is cooperative.            Result Review :   The following data was reviewed by: Carmen Jasmine PA-C on 07/19/2021:  AMSLABLIST: CBC              Assessment and Plan  Diagnoses and all orders for this visit:    1. Menorrhagia with regular cycle (Primary)  -     TSH  -     Follicle Stimulating Hormone  -     Estradiol    2. Encounter for screening mammogram for malignant neoplasm of breast  -     Mammo Screening Digital Tomosynthesis Bilateral With CAD; Future    3. Iron deficiency anemia due to chronic blood loss      Patient Instructions   Will check labs today  RTO for pap, possible endometrial biopsy and pelvic ultrasound. Pending results patient desires hysterectomy.              This note was electronically signed.    Carmen Jasmine PA-C   July 19, 2021

## 2021-07-30 ENCOUNTER — INFUSION (OUTPATIENT)
Dept: ONCOLOGY | Facility: HOSPITAL | Age: 41
End: 2021-07-30

## 2021-07-30 DIAGNOSIS — D50.0 IRON DEFICIENCY ANEMIA DUE TO CHRONIC BLOOD LOSS: ICD-10-CM

## 2021-07-30 DIAGNOSIS — E53.8 VITAMIN B 12 DEFICIENCY: Primary | ICD-10-CM

## 2021-07-30 LAB
BASOPHILS # BLD AUTO: 0.02 10*3/MM3 (ref 0–0.2)
BASOPHILS NFR BLD AUTO: 0.6 % (ref 0–1.5)
DEPRECATED RDW RBC AUTO: 51.5 FL (ref 37–54)
EOSINOPHIL # BLD AUTO: 0.05 10*3/MM3 (ref 0–0.4)
EOSINOPHIL NFR BLD AUTO: 1.5 % (ref 0.3–6.2)
ERYTHROCYTE [DISTWIDTH] IN BLOOD BY AUTOMATED COUNT: 16.1 % (ref 12.3–15.4)
HCT VFR BLD AUTO: 34.4 % (ref 34–46.6)
HGB BLD-MCNC: 11.2 G/DL (ref 12–15.9)
IMM GRANULOCYTES # BLD AUTO: 0.01 10*3/MM3 (ref 0–0.05)
IMM GRANULOCYTES NFR BLD AUTO: 0.3 % (ref 0–0.5)
LYMPHOCYTES # BLD AUTO: 0.64 10*3/MM3 (ref 0.7–3.1)
LYMPHOCYTES NFR BLD AUTO: 19.8 % (ref 19.6–45.3)
MCH RBC QN AUTO: 28.5 PG (ref 26.6–33)
MCHC RBC AUTO-ENTMCNC: 32.6 G/DL (ref 31.5–35.7)
MCV RBC AUTO: 87.5 FL (ref 79–97)
MONOCYTES # BLD AUTO: 0.18 10*3/MM3 (ref 0.1–0.9)
MONOCYTES NFR BLD AUTO: 5.6 % (ref 5–12)
NEUTROPHILS NFR BLD AUTO: 2.34 10*3/MM3 (ref 1.7–7)
NEUTROPHILS NFR BLD AUTO: 72.2 % (ref 42.7–76)
NRBC BLD AUTO-RTO: 0 /100 WBC (ref 0–0.2)
PLATELET # BLD AUTO: 166 10*3/MM3 (ref 140–450)
PMV BLD AUTO: 9.4 FL (ref 6–12)
RBC # BLD AUTO: 3.93 10*6/MM3 (ref 3.77–5.28)
WBC # BLD AUTO: 3.24 10*3/MM3 (ref 3.4–10.8)

## 2021-07-30 PROCEDURE — 96372 THER/PROPH/DIAG INJ SC/IM: CPT

## 2021-07-30 PROCEDURE — 36415 COLL VENOUS BLD VENIPUNCTURE: CPT

## 2021-07-30 PROCEDURE — 85025 COMPLETE CBC W/AUTO DIFF WBC: CPT

## 2021-07-30 PROCEDURE — 25010000002 CYANOCOBALAMIN PER 1000 MCG: Performed by: INTERNAL MEDICINE

## 2021-07-30 RX ORDER — CYANOCOBALAMIN 1000 UG/ML
1000 INJECTION, SOLUTION INTRAMUSCULAR; SUBCUTANEOUS ONCE
Status: COMPLETED | OUTPATIENT
Start: 2021-07-30 | End: 2021-07-30

## 2021-07-30 RX ADMIN — CYANOCOBALAMIN 1000 MCG: 1000 INJECTION, SOLUTION INTRAMUSCULAR at 15:10

## 2021-08-02 NOTE — PROGRESS NOTES
DATE OF VISIT: 8/4/2021    REASON FOR VISIT: Followup for normocytic anemia     HISTORY OF PRESENT ILLNESS: The patient is a very pleasant 41 y.o. female  with past medical history significant for normocytic anemia diagnosed May 2020. The  patient is here today for scheduled follow-up visit.    SUBJECTIVE: The patient has been doing fairly well. she was able to tolerate  her treatment without any serious side effects. she denied any fever or  chills, no night sweats, denied any headaches.     PAST MEDICAL HISTORY/SOCIAL HISTORY/FAMILY HISTORY: Reviewed by me and unchanged from my documentation done on 08/04/21.    Review of Systems   Constitutional: Negative for activity change, appetite change, chills, fatigue, fever and unexpected weight change.   HENT: Negative for hearing loss, mouth sores, nosebleeds, sore throat and trouble swallowing.    Eyes: Negative for visual disturbance.   Respiratory: Negative for cough, chest tightness, shortness of breath and wheezing.    Cardiovascular: Negative for chest pain, palpitations and leg swelling.   Gastrointestinal: Negative for abdominal distention, abdominal pain, blood in stool, constipation, diarrhea, nausea, rectal pain and vomiting.   Endocrine: Negative for cold intolerance and heat intolerance.   Genitourinary: Negative for difficulty urinating, dysuria, frequency and urgency.   Musculoskeletal: Negative for arthralgias, back pain, gait problem, joint swelling and myalgias.   Skin: Negative for rash.   Neurological: Negative for dizziness, tremors, syncope, weakness, light-headedness, numbness and headaches.   Hematological: Negative for adenopathy. Does not bruise/bleed easily.   Psychiatric/Behavioral: Negative for confusion, sleep disturbance and suicidal ideas. The patient is not nervous/anxious.          Current Outpatient Medications:   •  omeprazole (priLOSEC) 40 MG capsule, Take 1 capsule by mouth Daily As Needed (Reflux symptoms)., Disp: 30 capsule, Rfl:  1    PHYSICAL EXAMINATION:   LMP 07/08/2021 (Exact Date)    There were no vitals filed for this visit.                  ECOG Performance Status: 1 - Symptomatic but completely ambulatory  General Appearance:  alert, cooperative, no apparent distress and appears stated age   Neurologic/Psychiatric: A&O x 3, gait steady, appropriate affect, strength 5/5 in all muscle groups   HEENT:  Normocephalic, without obvious abnormality, mucous membranes moist   Neck: Supple, symmetrical, trachea midline, no adenopathy;  No thyromegaly, masses, or tenderness   Lungs:   Clear to auscultation bilaterally; respirations regular, even, and unlabored bilaterally   Heart:  Regular rate and rhythm, no murmurs appreciated   Abdomen:   Soft, non-tender, non-distended and no organomegaly   Lymph nodes: No cervical, supraclavicular, inguinal or axillary adenopathy noted   Extremities: Normal, atraumatic; no clubbing, cyanosis, or edema    Skin: No rashes, ulcers, or suspicious lesions noted     Infusion on 07/30/2021   Component Date Value Ref Range Status   • WBC 07/30/2021 3.24* 3.40 - 10.80 10*3/mm3 Final   • RBC 07/30/2021 3.93  3.77 - 5.28 10*6/mm3 Final   • Hemoglobin 07/30/2021 11.2* 12.0 - 15.9 g/dL Final   • Hematocrit 07/30/2021 34.4  34.0 - 46.6 % Final   • MCV 07/30/2021 87.5  79.0 - 97.0 fL Final   • MCH 07/30/2021 28.5  26.6 - 33.0 pg Final   • MCHC 07/30/2021 32.6  31.5 - 35.7 g/dL Final   • RDW 07/30/2021 16.1* 12.3 - 15.4 % Final   • RDW-SD 07/30/2021 51.5  37.0 - 54.0 fl Final   • MPV 07/30/2021 9.4  6.0 - 12.0 fL Final   • Platelets 07/30/2021 166  140 - 450 10*3/mm3 Final   • Neutrophil % 07/30/2021 72.2  42.7 - 76.0 % Final   • Lymphocyte % 07/30/2021 19.8  19.6 - 45.3 % Final   • Monocyte % 07/30/2021 5.6  5.0 - 12.0 % Final   • Eosinophil % 07/30/2021 1.5  0.3 - 6.2 % Final   • Basophil % 07/30/2021 0.6  0.0 - 1.5 % Final   • Immature Grans % 07/30/2021 0.3  0.0 - 0.5 % Final   • Neutrophils, Absolute 07/30/2021 2.34   1.70 - 7.00 10*3/mm3 Final   • Lymphocytes, Absolute 07/30/2021 0.64* 0.70 - 3.10 10*3/mm3 Final   • Monocytes, Absolute 07/30/2021 0.18  0.10 - 0.90 10*3/mm3 Final   • Eosinophils, Absolute 07/30/2021 0.05  0.00 - 0.40 10*3/mm3 Final   • Basophils, Absolute 07/30/2021 0.02  0.00 - 0.20 10*3/mm3 Final   • Immature Grans, Absolute 07/30/2021 0.01  0.00 - 0.05 10*3/mm3 Final   • nRBC 07/30/2021 0.0  0.0 - 0.2 /100 WBC Final        No results found.    ASSESSMENT: The patient is a very pleasant 41 y.o. female  with normocytic anemia    PROBLEM LIST:   1.  Normocytic anemia  2.  Iron deficiency:  A.  Negative GI evaluation EGD December 11, 2020 and colonoscopy March 5, 2020  3.  Polymenorrhagia  4.  Heartburn    PLAN:  1.  I did go over the blood work results from July 30, 2021 I reassured the patient her hemoglobin has improved to 11.2 with normal MCV and platelets.  She can to have mild leukopenia white blood cells 3.2.  2.  I did go over the iron profile and reassured the patient her ferritin is improved from 3 226.  Her vitamin B12 is also April from 203 to 449.  3.  At this point I recommend to continue monthly vitamin B12 replacement indefinitely.  She received her dose today.  4.  She will follow-up with me in 6-month repeat CBC vitamin B12 antiparietal cell antibody intrinsic factor antibody and iron profile.        Kennedy Yanes MD  8/4/2021

## 2021-08-04 ENCOUNTER — INFUSION (OUTPATIENT)
Dept: ONCOLOGY | Facility: HOSPITAL | Age: 41
End: 2021-08-04

## 2021-08-04 ENCOUNTER — OFFICE VISIT (OUTPATIENT)
Dept: ONCOLOGY | Facility: CLINIC | Age: 41
End: 2021-08-04

## 2021-08-04 VITALS
BODY MASS INDEX: 21.97 KG/M2 | TEMPERATURE: 97.7 F | SYSTOLIC BLOOD PRESSURE: 149 MMHG | OXYGEN SATURATION: 99 % | WEIGHT: 124 LBS | RESPIRATION RATE: 16 BRPM | HEART RATE: 85 BPM | DIASTOLIC BLOOD PRESSURE: 98 MMHG | HEIGHT: 63 IN

## 2021-08-04 VITALS
RESPIRATION RATE: 16 BRPM | HEART RATE: 79 BPM | OXYGEN SATURATION: 99 % | TEMPERATURE: 98.2 F | DIASTOLIC BLOOD PRESSURE: 87 MMHG | SYSTOLIC BLOOD PRESSURE: 145 MMHG

## 2021-08-04 DIAGNOSIS — D50.0 IRON DEFICIENCY ANEMIA DUE TO CHRONIC BLOOD LOSS: Primary | ICD-10-CM

## 2021-08-04 DIAGNOSIS — E53.8 VITAMIN B 12 DEFICIENCY: Primary | ICD-10-CM

## 2021-08-04 DIAGNOSIS — E53.8 VITAMIN B 12 DEFICIENCY: ICD-10-CM

## 2021-08-04 DIAGNOSIS — D50.0 IRON DEFICIENCY ANEMIA DUE TO CHRONIC BLOOD LOSS: ICD-10-CM

## 2021-08-04 PROCEDURE — 99214 OFFICE O/P EST MOD 30 MIN: CPT | Performed by: INTERNAL MEDICINE

## 2021-08-04 PROCEDURE — 25010000002 CYANOCOBALAMIN PER 1000 MCG: Performed by: INTERNAL MEDICINE

## 2021-08-04 PROCEDURE — 96372 THER/PROPH/DIAG INJ SC/IM: CPT

## 2021-08-04 RX ORDER — CYANOCOBALAMIN 1000 UG/ML
1000 INJECTION, SOLUTION INTRAMUSCULAR; SUBCUTANEOUS ONCE
Status: CANCELLED | OUTPATIENT
Start: 2021-08-04

## 2021-08-04 RX ORDER — CYANOCOBALAMIN 1000 UG/ML
1000 INJECTION, SOLUTION INTRAMUSCULAR; SUBCUTANEOUS ONCE
Status: COMPLETED | OUTPATIENT
Start: 2021-08-04 | End: 2021-08-04

## 2021-08-04 RX ADMIN — CYANOCOBALAMIN 1000 MCG: 1000 INJECTION, SOLUTION INTRAMUSCULAR at 11:25

## 2021-08-10 ENCOUNTER — OFFICE VISIT (OUTPATIENT)
Dept: OBSTETRICS AND GYNECOLOGY | Facility: CLINIC | Age: 41
End: 2021-08-10

## 2021-08-10 ENCOUNTER — PREP FOR SURGERY (OUTPATIENT)
Dept: OTHER | Facility: HOSPITAL | Age: 41
End: 2021-08-10

## 2021-08-10 VITALS
HEIGHT: 63 IN | SYSTOLIC BLOOD PRESSURE: 140 MMHG | BODY MASS INDEX: 21.79 KG/M2 | DIASTOLIC BLOOD PRESSURE: 100 MMHG | WEIGHT: 123 LBS

## 2021-08-10 DIAGNOSIS — Z92.89 HISTORY OF ENDOMETRIAL BIOPSY: ICD-10-CM

## 2021-08-10 DIAGNOSIS — R93.89 THICKENED ENDOMETRIUM: ICD-10-CM

## 2021-08-10 DIAGNOSIS — N92.0 MENORRHAGIA WITH REGULAR CYCLE: Primary | ICD-10-CM

## 2021-08-10 DIAGNOSIS — Z12.4 SCREENING FOR CERVICAL CANCER: ICD-10-CM

## 2021-08-10 LAB
B-HCG UR QL: NEGATIVE
INTERNAL NEGATIVE CONTROL: NORMAL
INTERNAL POSITIVE CONTROL: NORMAL
Lab: NORMAL

## 2021-08-10 PROCEDURE — 81025 URINE PREGNANCY TEST: CPT | Performed by: PHYSICIAN ASSISTANT

## 2021-08-10 PROCEDURE — 99214 OFFICE O/P EST MOD 30 MIN: CPT | Performed by: PHYSICIAN ASSISTANT

## 2021-08-10 PROCEDURE — 58100 BIOPSY OF UTERUS LINING: CPT | Performed by: PHYSICIAN ASSISTANT

## 2021-08-10 RX ORDER — SODIUM CHLORIDE 0.9 % (FLUSH) 0.9 %
10 SYRINGE (ML) INJECTION AS NEEDED
Status: CANCELLED | OUTPATIENT
Start: 2021-08-10

## 2021-08-10 RX ORDER — SODIUM CHLORIDE 0.9 % (FLUSH) 0.9 %
3 SYRINGE (ML) INJECTION EVERY 12 HOURS SCHEDULED
Status: CANCELLED | OUTPATIENT
Start: 2021-08-10

## 2021-08-10 NOTE — PATIENT INSTRUCTIONS
Patient is counseled regarding endometrial thickening and need for endometrial sampling.  As endometrial biopsy could not be accomplished in the office today, recommend proceeding with D&C hysteroscopy for further evaluation of endometrium and menorrhagia.  Patient and daughter voiced understanding.

## 2021-08-10 NOTE — PROGRESS NOTES
Subjective   Chief Complaint   Patient presents with   • Follow-up     TVS, Endometrial biopsy, Pap       Amandaamita Beasley is a 41 y.o. year old  presenting to be seen for follow up.  Her daughter is present for visit and interprets.    Patient was seen recently for menorrhagia. History of anemia requiring iron infusion in December  Recent CBC per hematology noted Hgb 11.2  She has had pelvic ultrasound today which is reviewed.  Uterus is retroflexed with 2 small intramural fibroids less than 2 cm.  Her endometrium is thickened at 18 mm.  Left ovary has a 2.5 cm simple cyst.  Right ovary has a 1.8 cm simple cyst.  Recent FSH noted to be 24 and estradiol level of 133.      Past Medical History:   Diagnosis Date   • Anemia    • Body piercing     both ears   • GERD (gastroesophageal reflux disease)    • History of blood transfusion     no reaction   • Wears glasses         Current Outpatient Medications:   •  omeprazole (priLOSEC) 40 MG capsule, Take 1 capsule by mouth Daily As Needed (Reflux symptoms)., Disp: 30 capsule, Rfl: 1   No Known Allergies   Past Surgical History:   Procedure Laterality Date   • COLONOSCOPY N/A 3/5/2021    Procedure: COLONOSCOPY WITH BIOPSY;  Surgeon: Jay Sharma MD;  Location: [facility] ENDOSCOPY;  Service: Gastroenterology;  Laterality: N/A;   • ENDOSCOPY N/A 2020    Procedure: ESOPHAGOGASTRODUODENOSCOPY WITH BIOPSY;  Surgeon: Jay Sharma MD;  Location: [facility] ENDOSCOPY;  Service: Gastroenterology;  Laterality: N/A;      Social History     Socioeconomic History   • Marital status:      Spouse name: Not on file   • Number of children: Not on file   • Years of education: Not on file   • Highest education level: Not on file   Tobacco Use   • Smoking status: Never Smoker   • Smokeless tobacco: Never Used   Vaping Use   • Vaping Use: Never used   Substance and Sexual Activity   • Alcohol use: Never   • Drug use: Never   • Sexual activity: Not Currently      "Birth control/protection: None      Family History   Problem Relation Age of Onset   • No Known Problems Father    • No Known Problems Mother    • Colon cancer Neg Hx    • Cirrhosis Neg Hx    • Liver cancer Neg Hx    • Liver disease Neg Hx        Review of Systems   Constitutional: Negative for chills, diaphoresis and fever.   Gastrointestinal: Negative.    Genitourinary: Positive for menstrual problem. Negative for dysuria, pelvic pain and vaginal discharge.           Objective   /100   Ht 160 cm (63\")   Wt 55.8 kg (123 lb)   LMP 07/08/2021   Breastfeeding No   BMI 21.79 kg/m²     Physical Exam       Result Review :   The following data was reviewed by: Carmen Jasmine PA-C on 08/10/2021:  AMSLABLIST: CBC, FSH, estradiol  Data reviewed: pelvic ultrasound images             Assessment and Plan  Diagnoses and all orders for this visit:    1. Menorrhagia with regular cycle (Primary)  -     US Non-ob Transvaginal    2. Thickened endometrium    3. Screening for cervical cancer  -     Pap IG, Rfx HPV ASCU; Future    4. History of endometrial biopsy  -     POC Pregnancy, Urine      Patient Instructions   Patient is counseled regarding endometrial thickening and need for endometrial sampling.  As endometrial biopsy could not be accomplished in the office today, recommend proceeding with D&C hysteroscopy for further evaluation of endometrium and menorrhagia.  Patient and daughter voiced understanding.             This note was electronically signed.    Carmen Jasmine PA-C   August 10, 2021  "

## 2021-08-10 NOTE — PROGRESS NOTES
Endometrial Biopsy    Date of procedure:  8/10/2021    Indication:                                     menorrhagia    Procedure documentation:    After informed consent obtained and all questions answered, the patient was placed in lithotomy position.  The cervix was cleaned with betadine and grasped anterior at the 12 o'clock position.  Endometrial Pipelle was advanced approximately 4 cm into the cervical canal however I could not get the endometrial Pipelle through the internal cervical os.  Multiple attempts were taken and despite changing traction direction could not get through the endometrial os internally.  Procedure was stopped at that point as patient was becoming uncomfortable. EBL nil        Follow up  prn    This note was electronically signed.    Carmen Jasmine PA-C  August 10, 2021

## 2021-08-11 PROBLEM — R93.89 THICKENED ENDOMETRIUM: Status: ACTIVE | Noted: 2021-08-11

## 2021-08-18 ENCOUNTER — PRE-ADMISSION TESTING (OUTPATIENT)
Dept: PREADMISSION TESTING | Facility: HOSPITAL | Age: 41
End: 2021-08-18

## 2021-08-18 VITALS — BODY MASS INDEX: 22.32 KG/M2 | HEIGHT: 63 IN | WEIGHT: 126 LBS

## 2021-08-18 DIAGNOSIS — N92.0 MENORRHAGIA WITH REGULAR CYCLE: ICD-10-CM

## 2021-08-18 DIAGNOSIS — R93.89 THICKENED ENDOMETRIUM: ICD-10-CM

## 2021-08-18 DIAGNOSIS — D50.0 IRON DEFICIENCY ANEMIA DUE TO CHRONIC BLOOD LOSS: ICD-10-CM

## 2021-08-18 LAB
BASOPHILS # BLD AUTO: 0.02 10*3/MM3 (ref 0–0.2)
BASOPHILS NFR BLD AUTO: 0.7 % (ref 0–1.5)
BILIRUB UR QL STRIP: NEGATIVE
CLARITY UR: CLEAR
COLOR UR: YELLOW
DEPRECATED RDW RBC AUTO: 46.9 FL (ref 37–54)
EOSINOPHIL # BLD AUTO: 0.05 10*3/MM3 (ref 0–0.4)
EOSINOPHIL NFR BLD AUTO: 1.7 % (ref 0.3–6.2)
ERYTHROCYTE [DISTWIDTH] IN BLOOD BY AUTOMATED COUNT: 14.5 % (ref 12.3–15.4)
FERRITIN SERPL-MCNC: 59.55 NG/ML (ref 13–150)
GLUCOSE UR STRIP-MCNC: NEGATIVE MG/DL
HCT VFR BLD AUTO: 36.2 % (ref 34–46.6)
HGB BLD-MCNC: 12 G/DL (ref 12–15.9)
HGB UR QL STRIP.AUTO: NEGATIVE
IMM GRANULOCYTES # BLD AUTO: 0.01 10*3/MM3 (ref 0–0.05)
IMM GRANULOCYTES NFR BLD AUTO: 0.3 % (ref 0–0.5)
IRON 24H UR-MRATE: 65 MCG/DL (ref 37–145)
IRON SATN MFR SERPL: 19 % (ref 20–50)
KETONES UR QL STRIP: NEGATIVE
LEUKOCYTE ESTERASE UR QL STRIP.AUTO: NEGATIVE
LYMPHOCYTES # BLD AUTO: 0.85 10*3/MM3 (ref 0.7–3.1)
LYMPHOCYTES NFR BLD AUTO: 28.4 % (ref 19.6–45.3)
MCH RBC QN AUTO: 29.3 PG (ref 26.6–33)
MCHC RBC AUTO-ENTMCNC: 33.1 G/DL (ref 31.5–35.7)
MCV RBC AUTO: 88.5 FL (ref 79–97)
MONOCYTES # BLD AUTO: 0.24 10*3/MM3 (ref 0.1–0.9)
MONOCYTES NFR BLD AUTO: 8 % (ref 5–12)
NEUTROPHILS NFR BLD AUTO: 1.82 10*3/MM3 (ref 1.7–7)
NEUTROPHILS NFR BLD AUTO: 60.9 % (ref 42.7–76)
NITRITE UR QL STRIP: NEGATIVE
NRBC BLD AUTO-RTO: 0 /100 WBC (ref 0–0.2)
PH UR STRIP.AUTO: 6.5 [PH] (ref 5–8)
PLATELET # BLD AUTO: 136 10*3/MM3 (ref 140–450)
PMV BLD AUTO: 9.6 FL (ref 6–12)
PROT UR QL STRIP: NEGATIVE
RBC # BLD AUTO: 4.09 10*6/MM3 (ref 3.77–5.28)
SP GR UR STRIP: 1.01 (ref 1–1.03)
TIBC SERPL-MCNC: 344 MCG/DL (ref 298–536)
TRANSFERRIN SERPL-MCNC: 231 MG/DL (ref 200–360)
UROBILINOGEN UR QL STRIP: NORMAL
WBC # BLD AUTO: 2.99 10*3/MM3 (ref 3.4–10.8)

## 2021-08-18 PROCEDURE — 85025 COMPLETE CBC W/AUTO DIFF WBC: CPT

## 2021-08-18 PROCEDURE — 81003 URINALYSIS AUTO W/O SCOPE: CPT

## 2021-08-18 PROCEDURE — 84466 ASSAY OF TRANSFERRIN: CPT

## 2021-08-18 PROCEDURE — 82728 ASSAY OF FERRITIN: CPT

## 2021-08-18 PROCEDURE — 36415 COLL VENOUS BLD VENIPUNCTURE: CPT

## 2021-08-18 PROCEDURE — 83540 ASSAY OF IRON: CPT

## 2021-08-20 DIAGNOSIS — Z12.4 SCREENING FOR CERVICAL CANCER: ICD-10-CM

## 2021-08-24 NOTE — H&P
[facility]  Amanda Beasley  : 1980  MRN: [id number]  CSN: [id number]    History and Physical    Subjective   Amanda Beasley is a 41 y.o. year old  who present for surgery due to menorrhagia with regular cycle and resultant anemia as well as thickened endometrial lining.    Past Medical History:   Diagnosis Date   • Anemia    • Body piercing     both ears   • COVID-19 vaccine series completed     phizer   • GERD (gastroesophageal reflux disease)    • History of blood transfusion     no reaction   • Iron deficiency    • Wears glasses      Past Surgical History:   Procedure Laterality Date   • COLONOSCOPY N/A 3/5/2021    Procedure: COLONOSCOPY WITH BIOPSY;  Surgeon: Jay Sharma MD;  Location: [facility] ENDOSCOPY;  Service: Gastroenterology;  Laterality: N/A;   • ENDOSCOPY N/A 2020    Procedure: ESOPHAGOGASTRODUODENOSCOPY WITH BIOPSY;  Surgeon: Jay Sharma MD;  Location: [facility] ENDOSCOPY;  Service: Gastroenterology;  Laterality: N/A;     Social History    Tobacco Use      Smoking status: Never Smoker      Smokeless tobacco: Never Used    No current facility-administered medications for this encounter.    Current Outpatient Medications:   •  omeprazole (priLOSEC) 40 MG capsule, Take 1 capsule by mouth Daily As Needed (Reflux symptoms)., Disp: 30 capsule, Rfl: 1    No Known Allergies    Review of Systems   Constitutional: Negative.    HENT: Negative.    Respiratory: Negative.    Cardiovascular: Negative.          Objective   There were no vitals taken for this visit.  General: well developed; well nourished  no acute distress   Heart: regular rate and rhythm, S1, S2 normal, no murmur, click, rub or gallop   Lungs: breathing is unlabored  clear to auscultation bilaterally   Abdomen: soft, non-tender; no masses  no umbilical or inguinal hernias are present  no hepato-splenomegaly   Pelvis::  Deferred   Labs  Lab Results   Component Value Date     (L) 2021    HGB 12.0  08/18/2021    HCT 36.2 08/18/2021    WBC 2.99 (L) 08/18/2021     05/05/2021    K 3.6 05/05/2021     05/05/2021    CO2 23.1 05/05/2021    BUN 6 05/05/2021    CREATININE 0.51 (L) 05/05/2021    GLUCOSE 114 (H) 05/05/2021    ALBUMIN 4.10 05/05/2021    CALCIUM 9.0 05/05/2021    AST 19 05/05/2021    ALT 11 05/05/2021    BILITOT 0.7 05/05/2021        Assessment   1. Menorrhagia with regular cycle  2. Anemia  3. Thickening endometrial lining     Plan   1. Hysteroscopy, dilatation and curettage   2. Risks, complications, benefits, and other alternatives discussed.    Andrew Hackett MD  8/24/2021  16:21 EDT

## 2021-08-25 ENCOUNTER — HOSPITAL ENCOUNTER (OUTPATIENT)
Facility: HOSPITAL | Age: 41
Setting detail: HOSPITAL OUTPATIENT SURGERY
Discharge: HOME | End: 2021-08-25
Attending: OBSTETRICS & GYNECOLOGY | Admitting: OBSTETRICS & GYNECOLOGY

## 2021-08-25 ENCOUNTER — ANESTHESIA EVENT (OUTPATIENT)
Dept: PERIOP | Facility: HOSPITAL | Age: 41
End: 2021-08-25

## 2021-08-25 ENCOUNTER — ANESTHESIA (OUTPATIENT)
Dept: PERIOP | Facility: HOSPITAL | Age: 41
End: 2021-08-25

## 2021-08-25 VITALS
HEART RATE: 70 BPM | SYSTOLIC BLOOD PRESSURE: 145 MMHG | TEMPERATURE: 97.8 F | DIASTOLIC BLOOD PRESSURE: 95 MMHG | RESPIRATION RATE: 16 BRPM | OXYGEN SATURATION: 100 %

## 2021-08-25 DIAGNOSIS — N92.1 MENOMETRORRHAGIA: Primary | ICD-10-CM

## 2021-08-25 DIAGNOSIS — R93.89 THICKENED ENDOMETRIUM: ICD-10-CM

## 2021-08-25 DIAGNOSIS — N92.0 MENORRHAGIA WITH REGULAR CYCLE: ICD-10-CM

## 2021-08-25 PROCEDURE — 25010000002 MIDAZOLAM PER 1MG: Performed by: NURSE ANESTHETIST, CERTIFIED REGISTERED

## 2021-08-25 PROCEDURE — 25010000002 KETOROLAC TROMETHAMINE PER 15 MG: Performed by: NURSE ANESTHETIST, CERTIFIED REGISTERED

## 2021-08-25 PROCEDURE — 25010000003 LIDOCAINE 1 % SOLUTION: Performed by: OBSTETRICS & GYNECOLOGY

## 2021-08-25 PROCEDURE — 25010000002 DEXAMETHASONE PER 1 MG: Performed by: NURSE ANESTHETIST, CERTIFIED REGISTERED

## 2021-08-25 PROCEDURE — 58558 HYSTEROSCOPY BIOPSY: CPT | Performed by: OBSTETRICS & GYNECOLOGY

## 2021-08-25 PROCEDURE — 25010000002 ONDANSETRON PER 1 MG: Performed by: NURSE ANESTHETIST, CERTIFIED REGISTERED

## 2021-08-25 PROCEDURE — 81025 URINE PREGNANCY TEST: CPT | Performed by: OBSTETRICS & GYNECOLOGY

## 2021-08-25 PROCEDURE — 25010000002 PROPOFOL 10 MG/ML EMULSION: Performed by: NURSE ANESTHETIST, CERTIFIED REGISTERED

## 2021-08-25 PROCEDURE — 25010000002 FENTANYL CITRATE (PF) 50 MCG/ML SOLUTION: Performed by: NURSE ANESTHETIST, CERTIFIED REGISTERED

## 2021-08-25 RX ORDER — FENTANYL CITRATE 50 UG/ML
INJECTION, SOLUTION INTRAMUSCULAR; INTRAVENOUS AS NEEDED
Status: DISCONTINUED | OUTPATIENT
Start: 2021-08-25 | End: 2021-08-25 | Stop reason: SURG

## 2021-08-25 RX ORDER — SODIUM CHLORIDE 0.9 % (FLUSH) 0.9 %
3 SYRINGE (ML) INJECTION EVERY 12 HOURS SCHEDULED
Status: DISCONTINUED | OUTPATIENT
Start: 2021-08-25 | End: 2021-08-25 | Stop reason: HOSPADM

## 2021-08-25 RX ORDER — KETOROLAC TROMETHAMINE 30 MG/ML
INJECTION, SOLUTION INTRAMUSCULAR; INTRAVENOUS AS NEEDED
Status: DISCONTINUED | OUTPATIENT
Start: 2021-08-25 | End: 2021-08-25 | Stop reason: SURG

## 2021-08-25 RX ORDER — LIDOCAINE HYDROCHLORIDE 20 MG/ML
INJECTION, SOLUTION INTRAVENOUS AS NEEDED
Status: DISCONTINUED | OUTPATIENT
Start: 2021-08-25 | End: 2021-08-25 | Stop reason: SURG

## 2021-08-25 RX ORDER — MIDAZOLAM HYDROCHLORIDE 2 MG/2ML
INJECTION, SOLUTION INTRAMUSCULAR; INTRAVENOUS AS NEEDED
Status: DISCONTINUED | OUTPATIENT
Start: 2021-08-25 | End: 2021-08-25 | Stop reason: SURG

## 2021-08-25 RX ORDER — LIDOCAINE HYDROCHLORIDE 10 MG/ML
INJECTION, SOLUTION INFILTRATION; PERINEURAL AS NEEDED
Status: DISCONTINUED | OUTPATIENT
Start: 2021-08-25 | End: 2021-08-25 | Stop reason: HOSPADM

## 2021-08-25 RX ORDER — SODIUM CHLORIDE, SODIUM LACTATE, POTASSIUM CHLORIDE, CALCIUM CHLORIDE 600; 310; 30; 20 MG/100ML; MG/100ML; MG/100ML; MG/100ML
1000 INJECTION, SOLUTION INTRAVENOUS CONTINUOUS
Status: DISCONTINUED | OUTPATIENT
Start: 2021-08-25 | End: 2021-08-25 | Stop reason: HOSPADM

## 2021-08-25 RX ORDER — IBUPROFEN 600 MG/1
600 TABLET ORAL EVERY 6 HOURS PRN
Qty: 30 TABLET | Refills: 1 | Status: SHIPPED | OUTPATIENT
Start: 2021-08-25 | End: 2021-11-08

## 2021-08-25 RX ORDER — SODIUM CHLORIDE 0.9 % (FLUSH) 0.9 %
10 SYRINGE (ML) INJECTION AS NEEDED
Status: DISCONTINUED | OUTPATIENT
Start: 2021-08-25 | End: 2021-08-25 | Stop reason: HOSPADM

## 2021-08-25 RX ORDER — MAGNESIUM HYDROXIDE 1200 MG/15ML
LIQUID ORAL AS NEEDED
Status: DISCONTINUED | OUTPATIENT
Start: 2021-08-25 | End: 2021-08-25 | Stop reason: HOSPADM

## 2021-08-25 RX ORDER — DEXAMETHASONE SODIUM PHOSPHATE 4 MG/ML
INJECTION, SOLUTION INTRA-ARTICULAR; INTRALESIONAL; INTRAMUSCULAR; INTRAVENOUS; SOFT TISSUE AS NEEDED
Status: DISCONTINUED | OUTPATIENT
Start: 2021-08-25 | End: 2021-08-25 | Stop reason: SURG

## 2021-08-25 RX ORDER — HYDROCODONE BITARTRATE AND ACETAMINOPHEN 5; 325 MG/1; MG/1
1 TABLET ORAL ONCE AS NEEDED
Status: CANCELLED | OUTPATIENT
Start: 2021-08-25 | End: 2021-09-01

## 2021-08-25 RX ORDER — HYDROCODONE BITARTRATE AND ACETAMINOPHEN 5; 325 MG/1; MG/1
1 TABLET ORAL EVERY 6 HOURS PRN
Qty: 4 TABLET | Refills: 0 | Status: SHIPPED | OUTPATIENT
Start: 2021-08-25 | End: 2021-11-08

## 2021-08-25 RX ORDER — IBUPROFEN 600 MG/1
600 TABLET ORAL EVERY 6 HOURS PRN
Status: CANCELLED | OUTPATIENT
Start: 2021-08-25

## 2021-08-25 RX ORDER — ONDANSETRON 2 MG/ML
INJECTION INTRAMUSCULAR; INTRAVENOUS AS NEEDED
Status: DISCONTINUED | OUTPATIENT
Start: 2021-08-25 | End: 2021-08-25 | Stop reason: SURG

## 2021-08-25 RX ADMIN — PROPOFOL 100 MCG/KG/MIN: 10 INJECTION, EMULSION INTRAVENOUS at 09:44

## 2021-08-25 RX ADMIN — SODIUM CHLORIDE, POTASSIUM CHLORIDE, SODIUM LACTATE AND CALCIUM CHLORIDE 1000 ML: 600; 310; 30; 20 INJECTION, SOLUTION INTRAVENOUS at 08:20

## 2021-08-25 RX ADMIN — MIDAZOLAM HYDROCHLORIDE 1 MG: 1 INJECTION, SOLUTION INTRAMUSCULAR; INTRAVENOUS at 09:42

## 2021-08-25 RX ADMIN — LIDOCAINE HYDROCHLORIDE 60 MG: 20 INJECTION, SOLUTION INTRAVENOUS at 09:44

## 2021-08-25 RX ADMIN — FENTANYL CITRATE 50 MCG: 50 INJECTION INTRAMUSCULAR; INTRAVENOUS at 09:42

## 2021-08-25 RX ADMIN — DEXAMETHASONE SODIUM PHOSPHATE 4 MG: 4 INJECTION, SOLUTION INTRAMUSCULAR; INTRAVENOUS at 09:53

## 2021-08-25 RX ADMIN — ONDANSETRON 4 MG: 2 INJECTION INTRAMUSCULAR; INTRAVENOUS at 09:53

## 2021-08-25 RX ADMIN — KETOROLAC TROMETHAMINE 15 MG: 30 INJECTION, SOLUTION INTRAMUSCULAR at 09:53

## 2021-08-25 NOTE — ANESTHESIA PREPROCEDURE EVALUATION
Anesthesia Evaluation     Patient summary reviewed and Nursing notes reviewed   no history of anesthetic complications:  NPO Solid Status: > 8 hours  NPO Liquid Status: > 8 hours           Airway   Mallampati: I  TM distance: >3 FB  Neck ROM: full  no difficulty expected  Dental - normal exam     Pulmonary - negative pulmonary ROS and normal exam   (-) not a smoker  Cardiovascular - negative cardio ROS and normal exam  Exercise tolerance: good (4-7 METS)        Neuro/Psych- negative ROS  GI/Hepatic/Renal/Endo    (+)  GERD, GI bleeding upper ,     Musculoskeletal (-) negative ROS    Abdominal    Substance History - negative use     OB/GYN negative ob/gyn ROS         Other   blood dyscrasia anemia,       Other Comment: hgb 5.8  Transfused 3 units 12/11.   ROS/Med Hx Other: Needs ,                   Anesthesia Plan    ASA 3     MAC   (Risks and benefits discussed including risk of aspiration, recall and dental damage. All patient questions answered via .    Will continue with plan of care.)  intravenous induction     Anesthetic plan, all risks, benefits, and alternatives have been provided, discussed and informed consent has been obtained with: patient.

## 2021-08-25 NOTE — OP NOTE
Braden Beasley  : 1980  MRN: [id number]  [facility]: [id number]  Date: 2021    Operative Report    DILATATION AND CURETTAGE HYSTEROSCOPY      Pre-op Diagnosis:  Menorrhagia with regular cycle [N92.0]  Thickened endometrium [R93.89]   Post-op Diagnosis:  Post-Op Diagnosis Codes:     * Menorrhagia with regular cycle [N92.0]     * Thickened endometrium [R93.89]   Procedure: Procedure(s):  DILATATION AND CURETTAGE HYSTEROSCOPY   Surgeon: BAM Hackett M.D.   Assist: WICHO Bishop  was responsible for performing the following activities: Retraction and their skilled assistance was necessary for the success of this case.     Anesthesia: Sedation   Estimated Blood Loss: <10 mls   ABx: none   Specimens:  Endometrial curettings   Findings:  Uterus retroverted.  Sounded to just under 8 cm.  Very thickened endometrial lining.  No obvious polyps or submucous fibroids noted.  Tubal ostia noted bilaterally.   Complications: none   Indications:  Menometrorrhagia with resultant anemia.  Thickened endometrial lining.  Risks benefits and alternatives were discussed and all questions were answered.     Description of Procedure:  After the appropriate time out and adequate dosing of her anesthesia, the patient had been prepped and draped in the usual sterile fashion.  She was placed in the dorsal lithotomy position using Roge stirrups.  The bladder had been drained with a red rubber catheter per nursing.  A weighted speculum was placed in the vagina.  The anterior lip of the cervix was grasped with a single-tooth tenaculum.  The cervix was injected at the 3 o'clock and 9 o'clock position with 1% lidocaine plain without any complications.  Uterus was sounded to just under 8 cm.  The cervix was then progressively dilated using Gates dilators.  Rigid hysteroscopy was then performed with the above findings noted.  Sharp curettings were then obtained and a significant amount of tissue was obtained-with a good  cry throughout with tissue retrieved and sent for pathologic specimen.   The cervical tenaculum was removed and the cervix was noted to be hemostatic.  All instrument and sponge counts were correct at the end of the procedure.  The patient tolerated the procedure well.  There were no complications.  She was taken to the postoperative recovery room in stable condition.    BAM Hackett M.D.  8/25/2021  10:04 EDT

## 2021-08-25 NOTE — ANESTHESIA POSTPROCEDURE EVALUATION
Patient: Amanda Beasley    Procedure Summary     Date: 08/25/21 Room / Location: [facility] OR 1 /  [facility]    Anesthesia Start: 0936 Anesthesia Stop: 1012    Procedure: DILATATION AND CURETTAGE HYSTEROSCOPY (N/A Uterus) Diagnosis:       Menorrhagia with regular cycle      Thickened endometrium      (Menorrhagia with regular cycle [N92.0])      (Thickened endometrium [R93.89])    Surgeons: Andrew Hackett MD Provider: Randee Powell CRNA    Anesthesia Type: MAC ASA Status: 3          Anesthesia Type: MAC    Vitals  Vitals Value Taken Time   /95 08/25/21 1040   Temp 97.8 °F (36.6 °C) 08/25/21 1011   Pulse 70 08/25/21 1040   Resp 16 08/25/21 1040   SpO2 100 % 08/25/21 1040           Post Anesthesia Care and Evaluation    Patient location during evaluation: PHASE II  Patient participation: complete - patient participated  Level of consciousness: awake and alert  Pain score: 0  Pain management: satisfactory to patient  Airway patency: patent  Anesthetic complications: No anesthetic complications  PONV Status: none  Cardiovascular status: acceptable and stable  Respiratory status: acceptable  Hydration status: acceptable

## 2021-08-25 NOTE — DISCHARGE INSTRUCTIONS
Pelvic rest is best described as not putting anything in your vagina. This includes tampons, douching, tub bathing or sexual activity.

## 2021-08-28 LAB
LAB AP CASE REPORT: NORMAL
PATH REPORT.FINAL DX SPEC: NORMAL

## 2021-09-01 ENCOUNTER — APPOINTMENT (OUTPATIENT)
Dept: ONCOLOGY | Facility: HOSPITAL | Age: 41
End: 2021-09-01

## 2021-09-02 ENCOUNTER — INFUSION (OUTPATIENT)
Dept: ONCOLOGY | Facility: HOSPITAL | Age: 41
End: 2021-09-02

## 2021-09-02 ENCOUNTER — OFFICE VISIT (OUTPATIENT)
Dept: OBSTETRICS AND GYNECOLOGY | Facility: CLINIC | Age: 41
End: 2021-09-02

## 2021-09-02 VITALS
TEMPERATURE: 97 F | RESPIRATION RATE: 12 BRPM | DIASTOLIC BLOOD PRESSURE: 96 MMHG | HEART RATE: 85 BPM | SYSTOLIC BLOOD PRESSURE: 138 MMHG | OXYGEN SATURATION: 99 %

## 2021-09-02 VITALS
WEIGHT: 123.8 LBS | DIASTOLIC BLOOD PRESSURE: 90 MMHG | BODY MASS INDEX: 21.93 KG/M2 | HEIGHT: 63 IN | SYSTOLIC BLOOD PRESSURE: 140 MMHG

## 2021-09-02 DIAGNOSIS — Z09 POSTOPERATIVE FOLLOW-UP: Primary | ICD-10-CM

## 2021-09-02 DIAGNOSIS — D50.0 IRON DEFICIENCY ANEMIA DUE TO CHRONIC BLOOD LOSS: ICD-10-CM

## 2021-09-02 DIAGNOSIS — E53.8 VITAMIN B 12 DEFICIENCY: Primary | ICD-10-CM

## 2021-09-02 PROCEDURE — 99024 POSTOP FOLLOW-UP VISIT: CPT | Performed by: PHYSICIAN ASSISTANT

## 2021-09-02 PROCEDURE — 25010000002 CYANOCOBALAMIN PER 1000 MCG: Performed by: NURSE PRACTITIONER

## 2021-09-02 PROCEDURE — 96372 THER/PROPH/DIAG INJ SC/IM: CPT

## 2021-09-02 RX ORDER — CYANOCOBALAMIN 1000 UG/ML
1000 INJECTION, SOLUTION INTRAMUSCULAR; SUBCUTANEOUS ONCE
Status: CANCELLED | OUTPATIENT
Start: 2021-09-29

## 2021-09-02 RX ORDER — CYANOCOBALAMIN 1000 UG/ML
1000 INJECTION, SOLUTION INTRAMUSCULAR; SUBCUTANEOUS ONCE
Status: COMPLETED | OUTPATIENT
Start: 2021-09-02 | End: 2021-09-02

## 2021-09-02 RX ORDER — CYANOCOBALAMIN 1000 UG/ML
1000 INJECTION, SOLUTION INTRAMUSCULAR; SUBCUTANEOUS ONCE
Status: CANCELLED | OUTPATIENT
Start: 2021-09-02

## 2021-09-02 RX ADMIN — CYANOCOBALAMIN 1000 MCG: 1000 INJECTION, SOLUTION INTRAMUSCULAR at 12:27

## 2021-09-02 NOTE — PROGRESS NOTES
Subjective   Chief Complaint   Patient presents with   • Post-op     8 days post-op D&C Hysteroscopy, doing well       Amanda Beasley is a 41 y.o. year old  presenting to be seen for postop visit.  She is doing well 8 days postop D&C hysteroscopy for menorrhagia and thickened endometrium.  Her pathology is benign noting disordered proliferative phase endometrium.  She has not had any bleeding for 4 days.  She did have some light bleeding the first 3 days postop.  Has had normal bowel and bladder function  She has no complaints today.    Past Medical History:   Diagnosis Date   • Anemia    • Body piercing     both ears   • COVID-19 vaccine series completed     phizer   • GERD (gastroesophageal reflux disease)    • History of blood transfusion     no reaction   • Iron deficiency    • Wears glasses         Current Outpatient Medications:   •  HYDROcodone-acetaminophen (NORCO) 5-325 MG per tablet, Take 1 tablet by mouth Every 6 (Six) Hours As Needed for Severe Pain ., Disp: 4 tablet, Rfl: 0  •  ibuprofen (ADVIL,MOTRIN) 600 MG tablet, Take 1 tablet by mouth Every 6 (Six) Hours As Needed for Moderate Pain ., Disp: 30 tablet, Rfl: 1  •  omeprazole (priLOSEC) 40 MG capsule, Take 1 capsule by mouth Daily As Needed (Reflux symptoms)., Disp: 30 capsule, Rfl: 1   No Known Allergies   Past Surgical History:   Procedure Laterality Date   • COLONOSCOPY N/A 3/5/2021    Procedure: COLONOSCOPY WITH BIOPSY;  Surgeon: aJy Sharma MD;  Location: [facility] ENDOSCOPY;  Service: Gastroenterology;  Laterality: N/A;   • D & C HYSTEROSCOPY N/A 2021    Procedure: DILATATION AND CURETTAGE HYSTEROSCOPY;  Surgeon: Andrew Hackett MD;  Location: [facility] OR;  Service: Obstetrics/Gynecology;  Laterality: N/A;   • DILATATION AND CURETTAGE     • ENDOSCOPY N/A 2020    Procedure: ESOPHAGOGASTRODUODENOSCOPY WITH BIOPSY;  Surgeon: Jay Sharma MD;  Location: [facility] ENDOSCOPY;  Service: Gastroenterology;   "Laterality: N/A;   • HYSTEROSCOPY        Social History     Socioeconomic History   • Marital status:      Spouse name: Not on file   • Number of children: Not on file   • Years of education: Not on file   • Highest education level: Not on file   Tobacco Use   • Smoking status: Never Smoker   • Smokeless tobacco: Never Used   Vaping Use   • Vaping Use: Never used   Substance and Sexual Activity   • Alcohol use: Never   • Drug use: Never   • Sexual activity: Not Currently     Birth control/protection: None      Family History   Problem Relation Age of Onset   • No Known Problems Father    • No Known Problems Mother    • Colon cancer Neg Hx    • Cirrhosis Neg Hx    • Liver cancer Neg Hx    • Liver disease Neg Hx        Review of Systems   Constitutional: Negative for chills, diaphoresis and fever.   Gastrointestinal: Negative.    Genitourinary: Negative for difficulty urinating, dysuria, pelvic pain, vaginal bleeding and vaginal discharge.           Objective   /90   Ht 160 cm (63\")   Wt 56.2 kg (123 lb 12.8 oz)   Breastfeeding No   BMI 21.93 kg/m²     Physical Exam  Constitutional:       Appearance: Normal appearance. She is well-developed and well-groomed.   Eyes:      General: Lids are normal.      Extraocular Movements: Extraocular movements intact.      Conjunctiva/sclera: Conjunctivae normal.   Skin:     General: Skin is warm and dry.      Findings: No bruising or lesion.   Neurological:      Mental Status: She is alert.   Psychiatric:         Attention and Perception: Attention normal.         Mood and Affect: Mood normal.         Speech: Speech normal.         Behavior: Behavior is cooperative.            Result Review :                   Assessment and Plan  Diagnoses and all orders for this visit:    1. Postoperative follow-up (Primary)      Patient Instructions   Will monitor cycles for the next 2-3 months  Follow up prn               This note was electronically signed.    Carmen NEVAREZ" SENAIT Jasmine   September 2, 2021

## 2021-10-06 ENCOUNTER — INFUSION (OUTPATIENT)
Dept: ONCOLOGY | Facility: HOSPITAL | Age: 41
End: 2021-10-06

## 2021-10-06 VITALS — HEART RATE: 94 BPM | DIASTOLIC BLOOD PRESSURE: 86 MMHG | SYSTOLIC BLOOD PRESSURE: 131 MMHG | TEMPERATURE: 98 F

## 2021-10-06 DIAGNOSIS — D50.0 IRON DEFICIENCY ANEMIA DUE TO CHRONIC BLOOD LOSS: ICD-10-CM

## 2021-10-06 DIAGNOSIS — E53.8 VITAMIN B 12 DEFICIENCY: Primary | ICD-10-CM

## 2021-10-06 PROCEDURE — 96372 THER/PROPH/DIAG INJ SC/IM: CPT

## 2021-10-06 PROCEDURE — 25010000002 CYANOCOBALAMIN PER 1000 MCG: Performed by: NURSE PRACTITIONER

## 2021-10-06 RX ORDER — CYANOCOBALAMIN 1000 UG/ML
1000 INJECTION, SOLUTION INTRAMUSCULAR; SUBCUTANEOUS ONCE
Status: COMPLETED | OUTPATIENT
Start: 2021-10-06 | End: 2021-10-06

## 2021-10-06 RX ADMIN — CYANOCOBALAMIN 1000 MCG: 1000 INJECTION, SOLUTION INTRAMUSCULAR at 14:10

## 2021-11-03 DIAGNOSIS — D50.0 IRON DEFICIENCY ANEMIA DUE TO CHRONIC BLOOD LOSS: ICD-10-CM

## 2021-11-03 DIAGNOSIS — E53.8 VITAMIN B 12 DEFICIENCY: Primary | ICD-10-CM

## 2021-11-03 RX ORDER — CYANOCOBALAMIN 1000 UG/ML
1000 INJECTION, SOLUTION INTRAMUSCULAR; SUBCUTANEOUS ONCE
Status: CANCELLED | OUTPATIENT
Start: 2021-11-04

## 2021-11-03 RX ORDER — CYANOCOBALAMIN 1000 UG/ML
1000 INJECTION, SOLUTION INTRAMUSCULAR; SUBCUTANEOUS ONCE
Status: CANCELLED | OUTPATIENT
Start: 2021-12-30

## 2021-11-03 RX ORDER — CYANOCOBALAMIN 1000 UG/ML
1000 INJECTION, SOLUTION INTRAMUSCULAR; SUBCUTANEOUS ONCE
Status: CANCELLED | OUTPATIENT
Start: 2021-12-02

## 2021-11-04 ENCOUNTER — INFUSION (OUTPATIENT)
Dept: ONCOLOGY | Facility: HOSPITAL | Age: 41
End: 2021-11-04

## 2021-11-04 VITALS — DIASTOLIC BLOOD PRESSURE: 78 MMHG | SYSTOLIC BLOOD PRESSURE: 128 MMHG | TEMPERATURE: 98 F | HEART RATE: 80 BPM

## 2021-11-04 DIAGNOSIS — D50.0 IRON DEFICIENCY ANEMIA DUE TO CHRONIC BLOOD LOSS: ICD-10-CM

## 2021-11-04 DIAGNOSIS — E53.8 VITAMIN B 12 DEFICIENCY: Primary | ICD-10-CM

## 2021-11-04 PROCEDURE — 25010000002 CYANOCOBALAMIN PER 1000 MCG: Performed by: INTERNAL MEDICINE

## 2021-11-04 PROCEDURE — 96372 THER/PROPH/DIAG INJ SC/IM: CPT

## 2021-11-04 RX ORDER — CYANOCOBALAMIN 1000 UG/ML
1000 INJECTION, SOLUTION INTRAMUSCULAR; SUBCUTANEOUS ONCE
Status: COMPLETED | OUTPATIENT
Start: 2021-11-04 | End: 2021-11-04

## 2021-11-04 RX ADMIN — CYANOCOBALAMIN 1000 MCG: 1000 INJECTION, SOLUTION INTRAMUSCULAR at 10:28

## 2021-11-08 ENCOUNTER — OFFICE VISIT (OUTPATIENT)
Dept: GASTROENTEROLOGY | Facility: CLINIC | Age: 41
End: 2021-11-08

## 2021-11-08 VITALS
HEIGHT: 63 IN | SYSTOLIC BLOOD PRESSURE: 118 MMHG | WEIGHT: 130 LBS | TEMPERATURE: 98.2 F | DIASTOLIC BLOOD PRESSURE: 82 MMHG | BODY MASS INDEX: 23.04 KG/M2

## 2021-11-08 DIAGNOSIS — D50.8 IRON DEFICIENCY ANEMIA SECONDARY TO INADEQUATE DIETARY IRON INTAKE: Primary | ICD-10-CM

## 2021-11-08 DIAGNOSIS — E53.8 VITAMIN B12 DEFICIENCY: ICD-10-CM

## 2021-11-08 DIAGNOSIS — K21.9 GASTROESOPHAGEAL REFLUX DISEASE WITHOUT ESOPHAGITIS: ICD-10-CM

## 2021-11-08 PROCEDURE — 99214 OFFICE O/P EST MOD 30 MIN: CPT | Performed by: INTERNAL MEDICINE

## 2021-11-08 RX ORDER — OMEPRAZOLE 20 MG/1
20 CAPSULE, DELAYED RELEASE ORAL DAILY
Qty: 30 CAPSULE | Refills: 5 | Status: SHIPPED | OUTPATIENT
Start: 2021-11-08

## 2021-11-08 NOTE — PROGRESS NOTES
Follow Up Note     Date: 2021   Patient Name: Amanda Beasley  MRN: [id number]  : 1980     Referring Physician: Tra Murphy MD    Chief Complaint:    Chief Complaint   Patient presents with   • Follow-up   • Anemia       Interval History:   2021  Amanda Beasley is a 41 y.o. female who is here today for follow up for her anemia.  She saw GYN and also hematology.  She had a uterine biopsies done which were all normal.  She has a follow-up appointment pending with Dr. Yanes next month.  Denies any significant reflux symptoms now.  She did have a iron infusion after hematology appointment    2021  Amanda Beasley is a 41 y.o. female who is here today for follow up for her ongoing anemia.  She states that she still gets frequent irregular vaginal bleeding.  Recently after she had a Covid vaccine she had an excessive bleeding twice an operative 10 days time.  Associated lower abdominal pain and back pain.  No rectal bleeding no melena no nausea vomiting.  She is not taking her iron pills now     2/10/2021  Amanda Beasley is a 41 y.o. female who is here today for follow up for her recent severe anemia.  She was recently discharged after being evaluated for her severe anemia with hemoglobin 5 g/dL.  Patient states that since discharge she is doing better.  Very delicate reflux symptoms while on Prilosec.   Has been having regular bowel movement no melena.  Her periods have changed now and she gets very scanty menstrual periods.      2020  She is feeling much better still has some burping and bloating sensation.  Denies any bowel movement since yesterday.  No nausea vomiting.  She had a an endoscopy done on 2020.  No events post procedure.     12/10/2020  This is 40-year-old female patient with a prior history of gastroesophageal reflux disease, chronic anemia, menorrhagia was a sent from PCPs office for severe anemia, upper abdominal pain and reflux reflux  symptoms.      Patient states that the she was doing fine until 2 days ago she developed significant reflux symptoms with a epigastric pain and belching.  She felt bloated.  She went to PCPs office and had a prescription for PPI was given blood work was drawn.  Blood work came out today with a severe anemia with a hemoglobin of 5.8 g/dL for which she was been told come to hospital for admission.      Patient currently still states epigastric pain with excessive belching and acid reflux.  She denies any vomiting denies any nausea.  She states that she has been having regular bowel movements without any melena or bright red rectal bleed.  There was no fever chills.  She denies any recent use of NSAIDs however in July she took few days of ibuprofen.      She states that she had a chronic anemia her hemoglobin was running around 8 to 9 g/dL in the past.  Her PCP was in [place] since July patient is in [place].  She states that she had a heavy menstrual periods in the past.  She used to get a periods twice in the month, frequency got improved slowly however she continued to have a excessive bleeding monthly.  She is currently having periods.     She denies any prior history of any endoscopy or colonoscopy.  No family history of any colon cancer or any GI malignancy.  She denies any prior history peptic ulcer disease.    Subjective      Past Medical History:   Past Medical History:   Diagnosis Date   • Anemia    • Body piercing     both ears   • COVID-19 vaccine series completed     phizer   • GERD (gastroesophageal reflux disease)    • History of blood transfusion     no reaction   • Iron deficiency    • Wears glasses      Past Surgical History:   Past Surgical History:   Procedure Laterality Date   • COLONOSCOPY N/A 3/5/2021    Procedure: COLONOSCOPY WITH BIOPSY;  Surgeon: Jay Sharma MD;  Location: [facility] ENDOSCOPY;  Service: Gastroenterology;  Laterality: N/A;   • D & C HYSTEROSCOPY N/A 8/25/2021     Procedure: DILATATION AND CURETTAGE HYSTEROSCOPY;  Surgeon: Andrew Hackett MD;  Location: [facility] OR;  Service: Obstetrics/Gynecology;  Laterality: N/A;   • DILATATION AND CURETTAGE     • ENDOSCOPY N/A 12/11/2020    Procedure: ESOPHAGOGASTRODUODENOSCOPY WITH BIOPSY;  Surgeon: Jay Sharma MD;  Location: [facility] ENDOSCOPY;  Service: Gastroenterology;  Laterality: N/A;   • HYSTEROSCOPY         Family History:   Family History   Problem Relation Age of Onset   • No Known Problems Father    • No Known Problems Mother    • Colon cancer Neg Hx    • Cirrhosis Neg Hx    • Liver cancer Neg Hx    • Liver disease Neg Hx        Social History:   Social History     Socioeconomic History   • Marital status:    Tobacco Use   • Smoking status: Never Smoker   • Smokeless tobacco: Never Used   Vaping Use   • Vaping Use: Never used   Substance and Sexual Activity   • Alcohol use: Never   • Drug use: Never   • Sexual activity: Not Currently     Birth control/protection: None       Medications:     Current Outpatient Medications:   •  omeprazole (priLOSEC) 40 MG capsule, Take 1 capsule by mouth Daily As Needed (Reflux symptoms)., Disp: 30 capsule, Rfl: 1    Allergies:   No Known Allergies    Review of Systems:   Review of Systems   Constitutional: Negative for appetite change, fatigue, fever and unexpected weight loss.   HENT: Negative for trouble swallowing.    Gastrointestinal: Negative for abdominal distention, abdominal pain, anal bleeding, blood in stool, constipation, diarrhea, nausea, rectal pain, vomiting, GERD and indigestion.       The following portions of the patient's history were reviewed and updated as appropriate: allergies, current medications, past family history, past medical history, past social history, past surgical history and problem list.    Objective     Physical Exam:  Vital Signs:   Vitals:    11/08/21 1436   BP: 118/82   Temp: 98.2 °F (36.8 °C)   TempSrc: Infrared   Weight: 59 kg  "(130 lb)   Height: 160 cm (63\")       Physical Exam  Constitutional:       Appearance: Normal appearance.   HENT:      Head: Normocephalic and atraumatic.   Eyes:      Conjunctiva/sclera: Conjunctivae normal.   Abdominal:      General: Abdomen is flat. There is no distension.      Palpations: There is no mass.      Tenderness: There is no abdominal tenderness. There is no guarding or rebound.      Hernia: No hernia is present.   Musculoskeletal:      Cervical back: Normal range of motion and neck supple.   Neurological:      Mental Status: She is alert.         Results Review:   I reviewed the patient's new clinical results.    Admission on 08/25/2021, Discharged on 08/25/2021   Component Date Value Ref Range Status   • HCG, Urine, QL 08/25/2021 Negative  Negative Final   • Lot Number 08/25/2021 [id number]   Final   • Internal Positive Control 08/25/2021 Passed  Positive, Passed Final   • Internal Negative Control 08/25/2021 Passed  Negative, Passed Final   • Case Report 08/25/2021    Final                    Value:Surgical Pathology Report                         Case: [id number]                                  Authorizing Provider:  Andrew Hackett,  Collected:           08/25/2021 09:59 AM                                 MD                                                                           Ordering Location:     [facility] OR Received:            08/25/2021 02:11 PM          Pathologist:           Tennille Timmons MD                                                   Specimen:    Endometrial Curettings                                                                    • Final Diagnosis 08/25/2021    Final                    Value:This result contains rich text formatting which cannot be displayed here.   Pre-Admission Testing on 08/18/2021   Component Date Value Ref Range Status   • Color, UA 08/18/2021 Yellow  Yellow, Straw Final   • Appearance, UA 08/18/2021 Clear  Clear Final "   • pH, UA 08/18/2021 6.5  5.0 - 8.0 Final   • Specific Gravity, UA 08/18/2021 1.011  1.005 - 1.030 Final   • Glucose, UA 08/18/2021 Negative  Negative Final   • Ketones, UA 08/18/2021 Negative  Negative Final   • Bilirubin, UA 08/18/2021 Negative  Negative Final   • Blood, UA 08/18/2021 Negative  Negative Final   • Protein, UA 08/18/2021 Negative  Negative Final   • Leuk Esterase, UA 08/18/2021 Negative  Negative Final   • Nitrite, UA 08/18/2021 Negative  Negative Final   • Urobilinogen, UA 08/18/2021 0.2 E.U./dL  0.2 - 1.0 E.U./dL Final   • Ferritin 08/18/2021 59.55  13.00 - 150.00 ng/mL Final   • Iron 08/18/2021 65  37 - 145 mcg/dL Final   • Iron Saturation 08/18/2021 19* 20 - 50 % Final   • Transferrin 08/18/2021 231  200 - 360 mg/dL Final   • TIBC 08/18/2021 344  298 - 536 mcg/dL Final   • WBC 08/18/2021 2.99* 3.40 - 10.80 10*3/mm3 Final   • RBC 08/18/2021 4.09  3.77 - 5.28 10*6/mm3 Final   • Hemoglobin 08/18/2021 12.0  12.0 - 15.9 g/dL Final   • Hematocrit 08/18/2021 36.2  34.0 - 46.6 % Final   • MCV 08/18/2021 88.5  79.0 - 97.0 fL Final   • MCH 08/18/2021 29.3  26.6 - 33.0 pg Final   • MCHC 08/18/2021 33.1  31.5 - 35.7 g/dL Final   • RDW 08/18/2021 14.5  12.3 - 15.4 % Final   • RDW-SD 08/18/2021 46.9  37.0 - 54.0 fl Final   • MPV 08/18/2021 9.6  6.0 - 12.0 fL Final   • Platelets 08/18/2021 136* 140 - 450 10*3/mm3 Final   • Neutrophil % 08/18/2021 60.9  42.7 - 76.0 % Final   • Lymphocyte % 08/18/2021 28.4  19.6 - 45.3 % Final   • Monocyte % 08/18/2021 8.0  5.0 - 12.0 % Final   • Eosinophil % 08/18/2021 1.7  0.3 - 6.2 % Final   • Basophil % 08/18/2021 0.7  0.0 - 1.5 % Final   • Immature Grans % 08/18/2021 0.3  0.0 - 0.5 % Final   • Neutrophils, Absolute 08/18/2021 1.82  1.70 - 7.00 10*3/mm3 Final   • Lymphocytes, Absolute 08/18/2021 0.85  0.70 - 3.10 10*3/mm3 Final   • Monocytes, Absolute 08/18/2021 0.24  0.10 - 0.90 10*3/mm3 Final   • Eosinophils, Absolute 08/18/2021 0.05  0.00 - 0.40 10*3/mm3 Final   •  Basophils, Absolute 08/18/2021 0.02  0.00 - 0.20 10*3/mm3 Final   • Immature Grans, Absolute 08/18/2021 0.01  0.00 - 0.05 10*3/mm3 Final   • nRBC 08/18/2021 0.0  0.0 - 0.2 /100 WBC Final      No radiology results for the last 90 days.    Assessment / Plan      1.  Symptomatic anemia  2.  Severe iron deficiency anemia  3.  Gastroesophageal reflux disease without erosive esophagitis  4.  Menorrhagia  5.  Vitamin B12 deficiency  11/8/2021  Patient feels much better after she had iron infusion.  She has a follow-up appointment pending with Dr. Yanes for next month.  She has been receiving the vitamin B12 shots now.  Evaluated by GYN and was been told everything was normal.  She has intensive factor and lab works pending for next month.   No significant reflux symptoms now.  Recent lab work reviewed and hemoglobin was normal 12 g/dL, platelets were borderline.  We will change her Prilosec to Prilosec 20 mg p.o. as needed.  Follow-up in 1 year time    5/5/2021  Her anemia is most likely secondary to menorrhagia and some competent of nutritional anemia cannot be ruled out.   She had a colonoscopy done on 3/5/2021 which was unremarkable except small internal hemorrhoids.  Terminal ileum was normal.  Ileal biopsies were unremarkable.  EGD done in December 2020 not reveal any significant source of upper GI bleed.  Duodenal and gastric biopsies were normal.  CT scan of the abdomen pelvis and the pelvic ultrasound done in 2020 were normal.   Her hemoglobin and the platelets significantly improved since December 2020.   CBC, CMP now  Given her persistent irregular periods and menorrhagia and anemia will refer to GYN for further recommendation  Given her thrombocytopenia noted in 2020, will also get an opinion from hematology  If her hemoglobin is low we will start her back on iron pills  No significant reflux symptoms now we will continue Prilosec 40 mg as needed for now  Follow-up in 6 months time     02/10/2021  Likely  menorrhagia induced severe symptomatic anemia.  . As per patient she had a blood work done recently at PCPs office and was 10 g/dL.  She was discharged with a hemoglobin of 9.5 to 10 g/dL.   She is not taking her iron pills, instead is taking liquid iron occasionally.  Advised to take iron pills every day  We will get blood work report from PCP and review and recommend further.  Her gastric biopsies did not reveal any H. pylori duodenal biopsies were normal. Her recent blood work reviewed and her platelets improved to 174.  Liver enzymes normal with normal total bilirubin.  Her transient elevation in total bilirubin during admission could be related to hemolysis from the menstrual bleeding..      12/12/2020  Patient appears to have a chronic anemia most likely associated with heavy menstrual periods.   EGD done on 12/10/2020 did not reveal any upper GI source of bleeding.  She did have LA grade a esophagitis.  Gastric biopsies and duodenal biopsy reports are pending.  No melena no bright red rectal bleed.  Status post 3 units PRBC transfusion with a stable hemoglobin.  CT scan of the abdomen pelvis done showed mild nonspecific nate hepatis edema.  No signs or lab studies indicating any hepatitis.  Pelvic ultrasound did not reveal any pelvic pathology     6.   Thrombocytopenia  5/5/2021  She still has a borderline thrombocytopenia and followed at hematology       Follow Up:   No follow-ups on file.    Jay Sharma MD  [facility]  11/8/2021  14:39 EST     Please note that portions of this note may have been completed with a voice recognition program.

## 2021-11-30 DIAGNOSIS — E53.8 VITAMIN B 12 DEFICIENCY: ICD-10-CM

## 2021-11-30 DIAGNOSIS — D50.0 IRON DEFICIENCY ANEMIA DUE TO CHRONIC BLOOD LOSS: Primary | ICD-10-CM

## 2021-12-01 ENCOUNTER — OFFICE VISIT (OUTPATIENT)
Dept: ONCOLOGY | Facility: CLINIC | Age: 41
End: 2021-12-01

## 2021-12-01 ENCOUNTER — TELEPHONE (OUTPATIENT)
Dept: ONCOLOGY | Facility: CLINIC | Age: 41
End: 2021-12-01

## 2021-12-01 ENCOUNTER — INFUSION (OUTPATIENT)
Dept: ONCOLOGY | Facility: HOSPITAL | Age: 41
End: 2021-12-01

## 2021-12-01 VITALS
HEIGHT: 63 IN | SYSTOLIC BLOOD PRESSURE: 138 MMHG | WEIGHT: 126 LBS | TEMPERATURE: 98 F | OXYGEN SATURATION: 99 % | RESPIRATION RATE: 12 BRPM | BODY MASS INDEX: 22.32 KG/M2 | HEART RATE: 77 BPM | DIASTOLIC BLOOD PRESSURE: 95 MMHG

## 2021-12-01 DIAGNOSIS — K90.9 IRON MALABSORPTION: Primary | ICD-10-CM

## 2021-12-01 DIAGNOSIS — E53.8 VITAMIN B 12 DEFICIENCY: Primary | ICD-10-CM

## 2021-12-01 DIAGNOSIS — E53.8 VITAMIN B 12 DEFICIENCY: ICD-10-CM

## 2021-12-01 DIAGNOSIS — D50.0 IRON DEFICIENCY ANEMIA DUE TO CHRONIC BLOOD LOSS: Primary | ICD-10-CM

## 2021-12-01 DIAGNOSIS — D50.0 IRON DEFICIENCY ANEMIA DUE TO CHRONIC BLOOD LOSS: ICD-10-CM

## 2021-12-01 LAB
BASOPHILS # BLD AUTO: 0.03 10*3/MM3 (ref 0–0.2)
BASOPHILS NFR BLD AUTO: 1.2 % (ref 0–1.5)
CRP SERPL-MCNC: <0.3 MG/DL (ref 0–0.5)
DEPRECATED RDW RBC AUTO: 39.2 FL (ref 37–54)
EOSINOPHIL # BLD AUTO: 0.04 10*3/MM3 (ref 0–0.4)
EOSINOPHIL NFR BLD AUTO: 1.7 % (ref 0.3–6.2)
ERYTHROCYTE [DISTWIDTH] IN BLOOD BY AUTOMATED COUNT: 11.8 % (ref 12.3–15.4)
ERYTHROCYTE [SEDIMENTATION RATE] IN BLOOD: 11 MM/HR (ref 0–20)
FERRITIN SERPL-MCNC: 19.97 NG/ML (ref 13–150)
HCT VFR BLD AUTO: 33.9 % (ref 34–46.6)
HGB BLD-MCNC: 10.9 G/DL (ref 12–15.9)
IMM GRANULOCYTES # BLD AUTO: 0.01 10*3/MM3 (ref 0–0.05)
IMM GRANULOCYTES NFR BLD AUTO: 0.4 % (ref 0–0.5)
IRON 24H UR-MRATE: 57 MCG/DL (ref 37–145)
IRON SATN MFR SERPL: 14 % (ref 20–50)
LYMPHOCYTES # BLD AUTO: 0.52 10*3/MM3 (ref 0.7–3.1)
LYMPHOCYTES NFR BLD AUTO: 21.5 % (ref 19.6–45.3)
MCH RBC QN AUTO: 29.2 PG (ref 26.6–33)
MCHC RBC AUTO-ENTMCNC: 32.2 G/DL (ref 31.5–35.7)
MCV RBC AUTO: 90.9 FL (ref 79–97)
MONOCYTES # BLD AUTO: 0.26 10*3/MM3 (ref 0.1–0.9)
MONOCYTES NFR BLD AUTO: 10.7 % (ref 5–12)
NEUTROPHILS NFR BLD AUTO: 1.56 10*3/MM3 (ref 1.7–7)
NEUTROPHILS NFR BLD AUTO: 64.5 % (ref 42.7–76)
NRBC BLD AUTO-RTO: 0 /100 WBC (ref 0–0.2)
PLATELET # BLD AUTO: 147 10*3/MM3 (ref 140–450)
PMV BLD AUTO: 9.8 FL (ref 6–12)
RBC # BLD AUTO: 3.73 10*6/MM3 (ref 3.77–5.28)
TIBC SERPL-MCNC: 398 MCG/DL (ref 298–536)
TRANSFERRIN SERPL-MCNC: 267 MG/DL (ref 200–360)
WBC NRBC COR # BLD: 2.42 10*3/MM3 (ref 3.4–10.8)

## 2021-12-01 PROCEDURE — 82607 VITAMIN B-12: CPT

## 2021-12-01 PROCEDURE — 36415 COLL VENOUS BLD VENIPUNCTURE: CPT

## 2021-12-01 PROCEDURE — 86140 C-REACTIVE PROTEIN: CPT

## 2021-12-01 PROCEDURE — 83540 ASSAY OF IRON: CPT

## 2021-12-01 PROCEDURE — 86340 INTRINSIC FACTOR ANTIBODY: CPT

## 2021-12-01 PROCEDURE — 83516 IMMUNOASSAY NONANTIBODY: CPT

## 2021-12-01 PROCEDURE — 96372 THER/PROPH/DIAG INJ SC/IM: CPT

## 2021-12-01 PROCEDURE — 86038 ANTINUCLEAR ANTIBODIES: CPT

## 2021-12-01 PROCEDURE — 86431 RHEUMATOID FACTOR QUANT: CPT

## 2021-12-01 PROCEDURE — 82728 ASSAY OF FERRITIN: CPT

## 2021-12-01 PROCEDURE — 85025 COMPLETE CBC W/AUTO DIFF WBC: CPT

## 2021-12-01 PROCEDURE — 84466 ASSAY OF TRANSFERRIN: CPT

## 2021-12-01 PROCEDURE — 99214 OFFICE O/P EST MOD 30 MIN: CPT | Performed by: INTERNAL MEDICINE

## 2021-12-01 PROCEDURE — 85651 RBC SED RATE NONAUTOMATED: CPT

## 2021-12-01 PROCEDURE — 25010000002 CYANOCOBALAMIN PER 1000 MCG: Performed by: INTERNAL MEDICINE

## 2021-12-01 RX ORDER — FAMOTIDINE 10 MG/ML
20 INJECTION, SOLUTION INTRAVENOUS ONCE
Status: CANCELLED | OUTPATIENT
Start: 2021-12-06

## 2021-12-01 RX ORDER — SODIUM CHLORIDE 9 MG/ML
250 INJECTION, SOLUTION INTRAVENOUS ONCE
Status: CANCELLED | OUTPATIENT
Start: 2021-12-13

## 2021-12-01 RX ORDER — SODIUM CHLORIDE 9 MG/ML
250 INJECTION, SOLUTION INTRAVENOUS ONCE
Status: CANCELLED | OUTPATIENT
Start: 2021-12-06

## 2021-12-01 RX ORDER — DIPHENHYDRAMINE HCL 25 MG
25 CAPSULE ORAL ONCE
Status: CANCELLED | OUTPATIENT
Start: 2021-12-06

## 2021-12-01 RX ORDER — FAMOTIDINE 10 MG/ML
20 INJECTION, SOLUTION INTRAVENOUS ONCE
Status: CANCELLED | OUTPATIENT
Start: 2021-12-13

## 2021-12-01 RX ORDER — DIPHENHYDRAMINE HCL 25 MG
25 CAPSULE ORAL ONCE
Status: CANCELLED | OUTPATIENT
Start: 2021-12-13

## 2021-12-01 RX ORDER — CYANOCOBALAMIN 1000 UG/ML
1000 INJECTION, SOLUTION INTRAMUSCULAR; SUBCUTANEOUS ONCE
Status: COMPLETED | OUTPATIENT
Start: 2021-12-01 | End: 2021-12-01

## 2021-12-01 RX ADMIN — CYANOCOBALAMIN 1000 MCG: 1000 INJECTION, SOLUTION INTRAMUSCULAR at 11:17

## 2021-12-01 NOTE — TELEPHONE ENCOUNTER
I called and spoke with patients .  Advised him patient needs 2 doses iv iron starting next week.  Advised our office would call to schedule an appt.

## 2021-12-02 ENCOUNTER — TELEPHONE (OUTPATIENT)
Dept: ONCOLOGY | Facility: CLINIC | Age: 41
End: 2021-12-02

## 2021-12-02 LAB
ANA SER QL: NEGATIVE
CHROMATIN AB SERPL-ACNC: 10.9 IU/ML (ref 0–14)
PCA AB SER-ACNC: 2 UNITS (ref 0–20)
VIT B12 BLD-MCNC: 301 PG/ML (ref 211–946)

## 2021-12-02 NOTE — TELEPHONE ENCOUNTER
----- Message from Sangita Robles RN sent at 2021  2:45 PM EST -----  Regarding: [facility]-is pt getting Injectofer on 21? Plan is dated for 21 and auth statement  on 21....  Also, the plan is on hold.  Thanks

## 2021-12-03 LAB — IF BLOCK AB SER-ACNC: 1.1 AU/ML (ref 0–1.1)

## 2021-12-06 ENCOUNTER — INFUSION (OUTPATIENT)
Dept: ONCOLOGY | Facility: HOSPITAL | Age: 41
End: 2021-12-06

## 2021-12-06 VITALS
HEART RATE: 74 BPM | SYSTOLIC BLOOD PRESSURE: 129 MMHG | RESPIRATION RATE: 16 BRPM | TEMPERATURE: 97.8 F | DIASTOLIC BLOOD PRESSURE: 84 MMHG | OXYGEN SATURATION: 96 %

## 2021-12-06 DIAGNOSIS — K90.9 IRON MALABSORPTION: Primary | ICD-10-CM

## 2021-12-06 DIAGNOSIS — D50.0 IRON DEFICIENCY ANEMIA DUE TO CHRONIC BLOOD LOSS: ICD-10-CM

## 2021-12-06 PROCEDURE — 96375 TX/PRO/DX INJ NEW DRUG ADDON: CPT

## 2021-12-06 PROCEDURE — 96365 THER/PROPH/DIAG IV INF INIT: CPT

## 2021-12-06 PROCEDURE — 63710000001 DIPHENHYDRAMINE PER 50 MG: Performed by: INTERNAL MEDICINE

## 2021-12-06 PROCEDURE — 25010000002 FERRIC CARBOXYMALTOSE 750 MG/15ML SOLUTION 15 ML VIAL: Performed by: INTERNAL MEDICINE

## 2021-12-06 RX ORDER — FAMOTIDINE 10 MG/ML
20 INJECTION, SOLUTION INTRAVENOUS ONCE
Status: COMPLETED | OUTPATIENT
Start: 2021-12-06 | End: 2021-12-06

## 2021-12-06 RX ORDER — SODIUM CHLORIDE 9 MG/ML
250 INJECTION, SOLUTION INTRAVENOUS ONCE
Status: DISCONTINUED | OUTPATIENT
Start: 2021-12-06 | End: 2021-12-06 | Stop reason: HOSPADM

## 2021-12-06 RX ORDER — DIPHENHYDRAMINE HCL 25 MG
25 CAPSULE ORAL ONCE
Status: COMPLETED | OUTPATIENT
Start: 2021-12-06 | End: 2021-12-06

## 2021-12-06 RX ADMIN — FERRIC CARBOXYMALTOSE INJECTION 750 MG: 50 INJECTION, SOLUTION INTRAVENOUS at 14:22

## 2021-12-06 RX ADMIN — FAMOTIDINE 20 MG: 10 INJECTION INTRAVENOUS at 13:56

## 2021-12-06 RX ADMIN — DIPHENHYDRAMINE HYDROCHLORIDE 25 MG: 25 CAPSULE ORAL at 13:56

## 2021-12-15 ENCOUNTER — INFUSION (OUTPATIENT)
Dept: ONCOLOGY | Facility: HOSPITAL | Age: 41
End: 2021-12-15

## 2021-12-15 VITALS — TEMPERATURE: 98.2 F | SYSTOLIC BLOOD PRESSURE: 128 MMHG | HEART RATE: 76 BPM | DIASTOLIC BLOOD PRESSURE: 89 MMHG

## 2021-12-15 DIAGNOSIS — D50.0 IRON DEFICIENCY ANEMIA DUE TO CHRONIC BLOOD LOSS: ICD-10-CM

## 2021-12-15 DIAGNOSIS — K90.9 IRON MALABSORPTION: Primary | ICD-10-CM

## 2021-12-15 PROCEDURE — 63710000001 DIPHENHYDRAMINE PER 50 MG: Performed by: INTERNAL MEDICINE

## 2021-12-15 PROCEDURE — 25010000002 FERRIC CARBOXYMALTOSE 750 MG/15ML SOLUTION 15 ML VIAL: Performed by: INTERNAL MEDICINE

## 2021-12-15 PROCEDURE — 96365 THER/PROPH/DIAG IV INF INIT: CPT

## 2021-12-15 PROCEDURE — 96375 TX/PRO/DX INJ NEW DRUG ADDON: CPT

## 2021-12-15 RX ORDER — FAMOTIDINE 10 MG/ML
20 INJECTION, SOLUTION INTRAVENOUS ONCE
Status: COMPLETED | OUTPATIENT
Start: 2021-12-15 | End: 2021-12-15

## 2021-12-15 RX ORDER — DIPHENHYDRAMINE HCL 25 MG
25 CAPSULE ORAL ONCE
Status: COMPLETED | OUTPATIENT
Start: 2021-12-15 | End: 2021-12-15

## 2021-12-15 RX ORDER — SODIUM CHLORIDE 9 MG/ML
250 INJECTION, SOLUTION INTRAVENOUS ONCE
Status: DISCONTINUED | OUTPATIENT
Start: 2021-12-15 | End: 2021-12-15 | Stop reason: HOSPADM

## 2021-12-15 RX ADMIN — DIPHENHYDRAMINE HYDROCHLORIDE 25 MG: 25 CAPSULE ORAL at 13:51

## 2021-12-15 RX ADMIN — FAMOTIDINE 20 MG: 10 INJECTION INTRAVENOUS at 13:56

## 2021-12-15 RX ADMIN — FERRIC CARBOXYMALTOSE INJECTION 750 MG: 50 INJECTION, SOLUTION INTRAVENOUS at 14:07

## 2021-12-29 ENCOUNTER — INFUSION (OUTPATIENT)
Dept: ONCOLOGY | Facility: HOSPITAL | Age: 41
End: 2021-12-29

## 2021-12-29 DIAGNOSIS — E53.8 VITAMIN B 12 DEFICIENCY: ICD-10-CM

## 2021-12-29 DIAGNOSIS — D50.0 IRON DEFICIENCY ANEMIA DUE TO CHRONIC BLOOD LOSS: Primary | ICD-10-CM

## 2021-12-29 PROCEDURE — 36415 COLL VENOUS BLD VENIPUNCTURE: CPT

## 2021-12-29 PROCEDURE — 25010000002 CYANOCOBALAMIN PER 1000 MCG: Performed by: INTERNAL MEDICINE

## 2021-12-29 PROCEDURE — 96372 THER/PROPH/DIAG INJ SC/IM: CPT

## 2021-12-29 RX ORDER — CYANOCOBALAMIN 1000 UG/ML
1000 INJECTION, SOLUTION INTRAMUSCULAR; SUBCUTANEOUS ONCE
Status: COMPLETED | OUTPATIENT
Start: 2021-12-29 | End: 2021-12-29

## 2021-12-29 RX ADMIN — CYANOCOBALAMIN 1000 MCG: 1000 INJECTION, SOLUTION INTRAMUSCULAR at 14:33

## 2022-01-25 ENCOUNTER — TELEPHONE (OUTPATIENT)
Dept: ONCOLOGY | Facility: CLINIC | Age: 42
End: 2022-01-25

## 2022-01-25 DIAGNOSIS — D50.0 IRON DEFICIENCY ANEMIA DUE TO CHRONIC BLOOD LOSS: ICD-10-CM

## 2022-01-25 DIAGNOSIS — E53.8 VITAMIN B 12 DEFICIENCY: Primary | ICD-10-CM

## 2022-01-25 RX ORDER — CYANOCOBALAMIN 1000 UG/ML
1000 INJECTION, SOLUTION INTRAMUSCULAR; SUBCUTANEOUS ONCE
Status: CANCELLED | OUTPATIENT
Start: 2022-01-26

## 2022-01-25 RX ORDER — CYANOCOBALAMIN 1000 UG/ML
1000 INJECTION, SOLUTION INTRAMUSCULAR; SUBCUTANEOUS ONCE
Status: CANCELLED | OUTPATIENT
Start: 2022-03-29

## 2022-01-25 RX ORDER — CYANOCOBALAMIN 1000 UG/ML
1000 INJECTION, SOLUTION INTRAMUSCULAR; SUBCUTANEOUS ONCE
Status: CANCELLED | OUTPATIENT
Start: 2022-02-24

## 2022-01-25 NOTE — TELEPHONE ENCOUNTER
----- Message from Leo Rushing RN sent at 1/25/2022  2:43 PM EST -----  Regarding: therapy plan  Hey, this patient is on for tomorrow and needs her b12 therapy plan signed. Thank you!

## 2022-02-23 ENCOUNTER — INFUSION (OUTPATIENT)
Dept: ONCOLOGY | Facility: HOSPITAL | Age: 42
End: 2022-02-23

## 2022-02-23 VITALS
HEART RATE: 83 BPM | RESPIRATION RATE: 16 BRPM | DIASTOLIC BLOOD PRESSURE: 91 MMHG | SYSTOLIC BLOOD PRESSURE: 134 MMHG | TEMPERATURE: 98.2 F

## 2022-02-23 DIAGNOSIS — E53.8 VITAMIN B 12 DEFICIENCY: Primary | ICD-10-CM

## 2022-02-23 DIAGNOSIS — D50.0 IRON DEFICIENCY ANEMIA DUE TO CHRONIC BLOOD LOSS: ICD-10-CM

## 2022-02-23 PROCEDURE — 25010000002 CYANOCOBALAMIN PER 1000 MCG: Performed by: INTERNAL MEDICINE

## 2022-02-23 PROCEDURE — 96372 THER/PROPH/DIAG INJ SC/IM: CPT

## 2022-02-23 RX ORDER — CYANOCOBALAMIN 1000 UG/ML
1000 INJECTION, SOLUTION INTRAMUSCULAR; SUBCUTANEOUS ONCE
Status: COMPLETED | OUTPATIENT
Start: 2022-02-23 | End: 2022-02-23

## 2022-02-23 RX ADMIN — CYANOCOBALAMIN 1000 MCG: 1000 INJECTION, SOLUTION INTRAMUSCULAR at 14:21

## 2022-03-28 ENCOUNTER — INFUSION (OUTPATIENT)
Dept: ONCOLOGY | Facility: HOSPITAL | Age: 42
End: 2022-03-28

## 2022-03-28 VITALS
WEIGHT: 128.9 LBS | OXYGEN SATURATION: 97 % | TEMPERATURE: 97.5 F | DIASTOLIC BLOOD PRESSURE: 84 MMHG | RESPIRATION RATE: 16 BRPM | BODY MASS INDEX: 22.84 KG/M2 | SYSTOLIC BLOOD PRESSURE: 121 MMHG | HEART RATE: 53 BPM

## 2022-03-28 DIAGNOSIS — E53.8 VITAMIN B 12 DEFICIENCY: Primary | ICD-10-CM

## 2022-03-28 DIAGNOSIS — D50.0 IRON DEFICIENCY ANEMIA DUE TO CHRONIC BLOOD LOSS: ICD-10-CM

## 2022-03-28 PROCEDURE — 25010000002 CYANOCOBALAMIN PER 1000 MCG: Performed by: INTERNAL MEDICINE

## 2022-03-28 PROCEDURE — 96372 THER/PROPH/DIAG INJ SC/IM: CPT

## 2022-03-28 RX ORDER — CYANOCOBALAMIN 1000 UG/ML
1000 INJECTION, SOLUTION INTRAMUSCULAR; SUBCUTANEOUS ONCE
Status: COMPLETED | OUTPATIENT
Start: 2022-03-28 | End: 2022-03-28

## 2022-03-28 RX ADMIN — CYANOCOBALAMIN 1000 MCG: 1000 INJECTION, SOLUTION INTRAMUSCULAR at 14:05

## 2022-04-27 ENCOUNTER — INFUSION (OUTPATIENT)
Dept: ONCOLOGY | Facility: HOSPITAL | Age: 42
End: 2022-04-27

## 2022-04-27 VITALS — HEART RATE: 65 BPM | SYSTOLIC BLOOD PRESSURE: 126 MMHG | DIASTOLIC BLOOD PRESSURE: 90 MMHG

## 2022-04-27 DIAGNOSIS — E53.8 VITAMIN B 12 DEFICIENCY: Primary | ICD-10-CM

## 2022-04-27 DIAGNOSIS — D50.0 IRON DEFICIENCY ANEMIA DUE TO CHRONIC BLOOD LOSS: ICD-10-CM

## 2022-04-27 PROCEDURE — 96372 THER/PROPH/DIAG INJ SC/IM: CPT

## 2022-04-27 PROCEDURE — 25010000002 CYANOCOBALAMIN PER 1000 MCG: Performed by: INTERNAL MEDICINE

## 2022-04-27 RX ORDER — CYANOCOBALAMIN 1000 UG/ML
1000 INJECTION, SOLUTION INTRAMUSCULAR; SUBCUTANEOUS ONCE
Status: COMPLETED | OUTPATIENT
Start: 2022-04-27 | End: 2022-04-27

## 2022-04-27 RX ADMIN — CYANOCOBALAMIN 1000 MCG: 1000 INJECTION, SOLUTION INTRAMUSCULAR at 14:34

## 2022-05-04 ENCOUNTER — TRANSCRIBE ORDERS (OUTPATIENT)
Dept: ADMINISTRATIVE | Facility: HOSPITAL | Age: 42
End: 2022-05-04

## 2022-05-04 DIAGNOSIS — Z12.31 VISIT FOR SCREENING MAMMOGRAM: Primary | ICD-10-CM

## 2022-05-25 ENCOUNTER — APPOINTMENT (OUTPATIENT)
Dept: INFUSION THERAPY | Facility: HOSPITAL | Age: 42
End: 2022-05-25

## 2022-05-25 DIAGNOSIS — E53.8 VITAMIN B 12 DEFICIENCY: Primary | ICD-10-CM

## 2022-05-25 DIAGNOSIS — D50.0 IRON DEFICIENCY ANEMIA DUE TO CHRONIC BLOOD LOSS: ICD-10-CM

## 2022-05-25 RX ORDER — CYANOCOBALAMIN 1000 UG/ML
1000 INJECTION, SOLUTION INTRAMUSCULAR; SUBCUTANEOUS ONCE
Status: CANCELLED | OUTPATIENT
Start: 2022-05-26

## 2022-05-26 ENCOUNTER — OFFICE VISIT (OUTPATIENT)
Dept: ONCOLOGY | Facility: CLINIC | Age: 42
End: 2022-05-26

## 2022-05-26 ENCOUNTER — OFFICE VISIT (OUTPATIENT)
Dept: OBSTETRICS AND GYNECOLOGY | Facility: CLINIC | Age: 42
End: 2022-05-26

## 2022-05-26 ENCOUNTER — INFUSION (OUTPATIENT)
Dept: ONCOLOGY | Facility: HOSPITAL | Age: 42
End: 2022-05-26

## 2022-05-26 VITALS
OXYGEN SATURATION: 98 % | WEIGHT: 132.4 LBS | DIASTOLIC BLOOD PRESSURE: 100 MMHG | TEMPERATURE: 98.2 F | HEART RATE: 99 BPM | SYSTOLIC BLOOD PRESSURE: 156 MMHG | BODY MASS INDEX: 23.45 KG/M2

## 2022-05-26 VITALS — HEART RATE: 77 BPM | SYSTOLIC BLOOD PRESSURE: 161 MMHG | DIASTOLIC BLOOD PRESSURE: 98 MMHG

## 2022-05-26 VITALS
SYSTOLIC BLOOD PRESSURE: 130 MMHG | HEIGHT: 63 IN | DIASTOLIC BLOOD PRESSURE: 84 MMHG | BODY MASS INDEX: 23.64 KG/M2 | WEIGHT: 133.4 LBS

## 2022-05-26 DIAGNOSIS — N92.0 MENORRHAGIA WITH REGULAR CYCLE: ICD-10-CM

## 2022-05-26 DIAGNOSIS — E53.8 VITAMIN B 12 DEFICIENCY: ICD-10-CM

## 2022-05-26 DIAGNOSIS — D50.0 IRON DEFICIENCY ANEMIA DUE TO CHRONIC BLOOD LOSS: Primary | ICD-10-CM

## 2022-05-26 DIAGNOSIS — N94.6 DYSMENORRHEA: Primary | ICD-10-CM

## 2022-05-26 DIAGNOSIS — K90.9 IRON MALABSORPTION: ICD-10-CM

## 2022-05-26 DIAGNOSIS — E53.8 VITAMIN B 12 DEFICIENCY: Primary | ICD-10-CM

## 2022-05-26 DIAGNOSIS — D50.0 IRON DEFICIENCY ANEMIA DUE TO CHRONIC BLOOD LOSS: ICD-10-CM

## 2022-05-26 LAB
BASOPHILS # BLD AUTO: 0.02 10*3/MM3 (ref 0–0.2)
BASOPHILS NFR BLD AUTO: 0.6 % (ref 0–1.5)
DEPRECATED RDW RBC AUTO: 37.4 FL (ref 37–54)
EOSINOPHIL # BLD AUTO: 0.05 10*3/MM3 (ref 0–0.4)
EOSINOPHIL NFR BLD AUTO: 1.5 % (ref 0.3–6.2)
ERYTHROCYTE [DISTWIDTH] IN BLOOD BY AUTOMATED COUNT: 11.8 % (ref 12.3–15.4)
FERRITIN SERPL-MCNC: 67.66 NG/ML (ref 13–150)
HCT VFR BLD AUTO: 35.8 % (ref 34–46.6)
HGB BLD-MCNC: 12.2 G/DL (ref 12–15.9)
IMM GRANULOCYTES # BLD AUTO: 0.01 10*3/MM3 (ref 0–0.05)
IMM GRANULOCYTES NFR BLD AUTO: 0.3 % (ref 0–0.5)
IRON 24H UR-MRATE: 77 MCG/DL (ref 37–145)
IRON SATN MFR SERPL: 20 % (ref 20–50)
LYMPHOCYTES # BLD AUTO: 0.75 10*3/MM3 (ref 0.7–3.1)
LYMPHOCYTES NFR BLD AUTO: 22.2 % (ref 19.6–45.3)
MCH RBC QN AUTO: 30 PG (ref 26.6–33)
MCHC RBC AUTO-ENTMCNC: 34.1 G/DL (ref 31.5–35.7)
MCV RBC AUTO: 88 FL (ref 79–97)
MONOCYTES # BLD AUTO: 0.21 10*3/MM3 (ref 0.1–0.9)
MONOCYTES NFR BLD AUTO: 6.2 % (ref 5–12)
NEUTROPHILS NFR BLD AUTO: 2.34 10*3/MM3 (ref 1.7–7)
NEUTROPHILS NFR BLD AUTO: 69.2 % (ref 42.7–76)
NRBC BLD AUTO-RTO: 0 /100 WBC (ref 0–0.2)
PLATELET # BLD AUTO: 162 10*3/MM3 (ref 140–450)
PMV BLD AUTO: 10.1 FL (ref 6–12)
RBC # BLD AUTO: 4.07 10*6/MM3 (ref 3.77–5.28)
TIBC SERPL-MCNC: 386 MCG/DL (ref 298–536)
TRANSFERRIN SERPL-MCNC: 259 MG/DL (ref 200–360)
WBC NRBC COR # BLD: 3.38 10*3/MM3 (ref 3.4–10.8)

## 2022-05-26 PROCEDURE — 96372 THER/PROPH/DIAG INJ SC/IM: CPT

## 2022-05-26 PROCEDURE — 84466 ASSAY OF TRANSFERRIN: CPT

## 2022-05-26 PROCEDURE — 36415 COLL VENOUS BLD VENIPUNCTURE: CPT

## 2022-05-26 PROCEDURE — 25010000002 CYANOCOBALAMIN PER 1000 MCG: Performed by: INTERNAL MEDICINE

## 2022-05-26 PROCEDURE — 82607 VITAMIN B-12: CPT

## 2022-05-26 PROCEDURE — 83540 ASSAY OF IRON: CPT

## 2022-05-26 PROCEDURE — 85025 COMPLETE CBC W/AUTO DIFF WBC: CPT

## 2022-05-26 PROCEDURE — 99214 OFFICE O/P EST MOD 30 MIN: CPT | Performed by: PHYSICIAN ASSISTANT

## 2022-05-26 PROCEDURE — 82728 ASSAY OF FERRITIN: CPT

## 2022-05-26 PROCEDURE — 99214 OFFICE O/P EST MOD 30 MIN: CPT | Performed by: NURSE PRACTITIONER

## 2022-05-26 RX ORDER — CYANOCOBALAMIN 1000 UG/ML
1000 INJECTION, SOLUTION INTRAMUSCULAR; SUBCUTANEOUS ONCE
Status: COMPLETED | OUTPATIENT
Start: 2022-05-26 | End: 2022-05-26

## 2022-05-26 RX ADMIN — CYANOCOBALAMIN 1000 MCG: 1000 INJECTION, SOLUTION INTRAMUSCULAR at 13:37

## 2022-05-26 NOTE — PROGRESS NOTES
DATE OF VISIT: 5/26/2022    REASON FOR VISIT: Followup for normocytic anemia     PROBLEM LIST:  1.  Normocytic anemia  2.  Iron deficiency:  A.  Negative GI evaluation EGD December 11, 2020 and colonoscopy March 5, 2020  3.  Polymenorrhagia  A.  Improved after endometrial ablation done August 25, 2021  B.  Pathology report revealed no evidence of malignancy  C.  Worsening on 5/26/2022.  Considering partial hysterectomy versus total hysterectomy  4.  Heartburn    HISTORY OF PRESENT ILLNESS: The patient is a very pleasant 42 y.o. female  with past medical history significant for normocytic anemia diagnosed May 2020.  Iron deficiency anemia noted 5/18/2021.  She had IV iron at that time.  Improved anemia.  The patient is here today for follow-up    SUBJECTIVE: The patient is here today with her daughter.  Energy has been pretty good.  She reports that her periods remain fairly heavy with significant pain.  She saw Dr. Frost today.  She had endometrial ablation done August 2021.  She has options of total hysterectomy versus removal of her uterus.  She is undecided what she wants to do.      Past History:  Medical History: has a past medical history of Anemia, Body piercing, COVID-19 vaccine series completed, GERD (gastroesophageal reflux disease), History of blood transfusion, Iron deficiency, and Wears glasses.   Surgical History: has a past surgical history that includes Esophagogastroduodenoscopy (N/A, 12/11/2020); Colonoscopy (N/A, 3/5/2021); d & c hysteroscopy (N/A, 8/25/2021); Dilation and curettage of uterus; and Hysteroscopy.   Family History: family history includes No Known Problems in her father and mother.   Social History: reports that she has never smoked. She has never used smokeless tobacco. She reports that she does not drink alcohol and does not use drugs.    (Not in a hospital admission)     Allergies: Patient has no known allergies.     Review of Systems   Gastrointestinal: Positive for abdominal  distention and abdominal pain.   All other systems reviewed and are negative.      PHYSICAL EXAMINATION:   /100   Pulse 99   Temp 98.2 °F (36.8 °C)   Wt 60.1 kg (132 lb 6.4 oz)   LMP 04/29/2022 (Exact Date)   SpO2 98%   BMI 23.45 kg/m²    There were no vitals filed for this visit.                  ECOG Performance Status: 1 - Symptomatic but completely ambulatory      General Appearance:      alert, cooperative, no apparent distress and appears stated age   Lungs:   Clear to auscultation bilaterally; respirations regular, even, and unlabored bilaterally   Heart:  Regular rate and rhythm, no murmurs appreciated   Abdomen:   Soft, non-tender, non-distended and no organomegaly                 No visits with results within 2 Week(s) from this visit.   Latest known visit with results is:   Infusion on 12/01/2021   Component Date Value Ref Range Status   • Vitamin B-12 12/01/2021 301  211 - 946 pg/mL Final   • Iron 12/01/2021 57  37 - 145 mcg/dL Final   • Iron Saturation 12/01/2021 14 (A) 20 - 50 % Final   • Transferrin 12/01/2021 267  200 - 360 mg/dL Final   • TIBC 12/01/2021 398  298 - 536 mcg/dL Final   • Ferritin 12/01/2021 19.97  13.00 - 150.00 ng/mL Final   • Parietal Cell Ab 12/01/2021 2.0  0.0 - 20.0 Units Final                                    Negative    0.0 - 20.0                                  Equivocal  20.1 - 24.9                                  Positive         >24.9  Parietal Cell Antibodies are found in 90% of patients  with pernicious anemia and 30% of first degree  relatives with pernicious anemia.   • Intrinsic Factor Antibodies 12/01/2021 1.1  0.0 - 1.1 AU/mL Final   • C-Reactive Protein 12/01/2021 <0.30  0.00 - 0.50 mg/dL Final   • Sed Rate 12/01/2021 11  0 - 20 mm/hr Final   • BLAIR Direct 12/01/2021 Negative  Negative Final   • Rheumatoid Factor Quantitative 12/01/2021 10.9  0.0 - 14.0 IU/mL Final   • WBC 12/01/2021 2.42 (A) 3.40 - 10.80 10*3/mm3 Final   • RBC 12/01/2021 3.73 (A)  3.77 - 5.28 10*6/mm3 Final   • Hemoglobin 12/01/2021 10.9 (A) 12.0 - 15.9 g/dL Final   • Hematocrit 12/01/2021 33.9 (A) 34.0 - 46.6 % Final   • MCV 12/01/2021 90.9  79.0 - 97.0 fL Final   • MCH 12/01/2021 29.2  26.6 - 33.0 pg Final   • MCHC 12/01/2021 32.2  31.5 - 35.7 g/dL Final   • RDW 12/01/2021 11.8 (A) 12.3 - 15.4 % Final   • RDW-SD 12/01/2021 39.2  37.0 - 54.0 fl Final   • MPV 12/01/2021 9.8  6.0 - 12.0 fL Final   • Platelets 12/01/2021 147  140 - 450 10*3/mm3 Final   • Neutrophil % 12/01/2021 64.5  42.7 - 76.0 % Final   • Lymphocyte % 12/01/2021 21.5  19.6 - 45.3 % Final   • Monocyte % 12/01/2021 10.7  5.0 - 12.0 % Final   • Eosinophil % 12/01/2021 1.7  0.3 - 6.2 % Final   • Basophil % 12/01/2021 1.2  0.0 - 1.5 % Final   • Immature Grans % 12/01/2021 0.4  0.0 - 0.5 % Final   • Neutrophils, Absolute 12/01/2021 1.56 (A) 1.70 - 7.00 10*3/mm3 Final   • Lymphocytes, Absolute 12/01/2021 0.52 (A) 0.70 - 3.10 10*3/mm3 Final   • Monocytes, Absolute 12/01/2021 0.26  0.10 - 0.90 10*3/mm3 Final   • Eosinophils, Absolute 12/01/2021 0.04  0.00 - 0.40 10*3/mm3 Final   • Basophils, Absolute 12/01/2021 0.03  0.00 - 0.20 10*3/mm3 Final   • Immature Grans, Absolute 12/01/2021 0.01  0.00 - 0.05 10*3/mm3 Final   • nRBC 12/01/2021 0.0  0.0 - 0.2 /100 WBC Final        No results found.    ASSESSMENT: The patient is a very pleasant 42 y.o. female  with normocytic anemia, iron deficiency anemia, and vitamin B-12 deficiency      PLAN:    1.  Normocytic anemia  A. I did go over the blood work results from December 1, 2021 I reassured the patient her hemoglobin has decreased to 10.9 with normal MCV and platelets 147.  She continues to have mild leukopenia white blood cells 2.42.  B.  I will follow up on her labs from today and call her with any significant abnormalities.   C. She will follow-up in 6-month repeat CBC vitamin B12 antiparietal cell antibody intrinsic factor antibody and iron profile.  D.  We discussed that sed rate was  normal, BLAIR negative, rheumatoid factor normal.  We also discussed intrinsic factor antibodies was 1.1 which was high normal.    2.  Iron deficiency anemia.  A.  We will check blood work today and I will call her with results.  B.  If ferritin level is less than 30 or iron saturations less than 10 we will plan to repeat IV iron    3.  Vitamin B12 deficiency  A.  At this point I recommend to continue monthly vitamin B12 replacement indefinitely.  B.  Parietal cell antibody was considered negative at 2.0.  Parietal cell antibodies are found in 90% of patients with pernicious anemia and 30% of first-degree relatives with pernicious anemia      FOLLOW UP: 6 months with repeat labs    Tatiana Henderson, APRN  5/26/2022

## 2022-05-26 NOTE — PROGRESS NOTES
Subjective   Chief Complaint   Patient presents with   • Menstrual Problem     Patient is here today C/O painful periods       Amanda Beasley is a 42 y.o. year old  presenting to be seen for painful menses.  Patient's daughter is present for visit today and translates for patient, though patient does speak limited English.  Menstrual cycles occur every month and flow usually last 6 days but has extreme dysmenorrhea the first 2 to 3 days of her bleed.  She has difficulty getting out of bed and working with severe dysmenorrhea  Menses remain heavy and she had a D&C 2021 for menorrhagia with benign pathology.  Patient cannot take NSAIDs due to GI issues.  She does not want to try any hormonal contraception such as an IUD or Depo-Provera.  Patient is wanting to have a hysterectomy for definitive treatment.  LMP 2022    Past Medical History:   Diagnosis Date   • Anemia    • Body piercing     both ears   • COVID-19 vaccine series completed     phizer   • GERD (gastroesophageal reflux disease)    • History of blood transfusion     no reaction   • Iron deficiency    • Wears glasses         Current Outpatient Medications:   •  omeprazole (priLOSEC) 20 MG capsule, Take 1 capsule by mouth Daily., Disp: 30 capsule, Rfl: 5   No Known Allergies   Past Surgical History:   Procedure Laterality Date   • COLONOSCOPY N/A 3/5/2021    Procedure: COLONOSCOPY WITH BIOPSY;  Surgeon: Jay Sharma MD;  Location: [facility] ENDOSCOPY;  Service: Gastroenterology;  Laterality: N/A;   • D & C HYSTEROSCOPY N/A 2021    Procedure: DILATATION AND CURETTAGE HYSTEROSCOPY;  Surgeon: Andrew Hackett MD;  Location: [facility] OR;  Service: Obstetrics/Gynecology;  Laterality: N/A;   • DILATATION AND CURETTAGE     • ENDOSCOPY N/A 2020    Procedure: ESOPHAGOGASTRODUODENOSCOPY WITH BIOPSY;  Surgeon: Jay Sharma MD;  Location: [facility] ENDOSCOPY;  Service: Gastroenterology;  Laterality: N/A;   • HYSTEROSCOPY   "      Social History     Socioeconomic History   • Marital status:    Tobacco Use   • Smoking status: Never Smoker   • Smokeless tobacco: Never Used   Vaping Use   • Vaping Use: Never used   Substance and Sexual Activity   • Alcohol use: Never   • Drug use: Never   • Sexual activity: Not Currently     Birth control/protection: None      Family History   Problem Relation Age of Onset   • No Known Problems Father    • No Known Problems Mother    • Colon cancer Neg Hx    • Cirrhosis Neg Hx    • Liver cancer Neg Hx    • Liver disease Neg Hx        Review of Systems   Constitutional: Negative for chills, diaphoresis and fever.   Gastrointestinal: Negative.    Genitourinary: Positive for menstrual problem. Negative for difficulty urinating and dysuria.           Objective   /84   Ht 160 cm (63\")   Wt 60.5 kg (133 lb 6.4 oz)   LMP 04/29/2022 (Exact Date)   Breastfeeding No   BMI 23.63 kg/m²     Physical Exam  Constitutional:       Appearance: Normal appearance. She is well-developed and well-groomed.   Eyes:      General: Lids are normal.      Extraocular Movements: Extraocular movements intact.      Conjunctiva/sclera: Conjunctivae normal.   Skin:     General: Skin is warm and dry.      Findings: No bruising or lesion.   Neurological:      Mental Status: She is alert.   Psychiatric:         Attention and Perception: Attention normal.         Mood and Affect: Mood normal.         Speech: Speech normal.         Behavior: Behavior is cooperative.            Result Review :   The following data was reviewed by: Carmen Jasmine PA-C on 05/26/2022:    Data reviewed: pelvic ultrasound 2021, OP note             Assessment and Plan  Diagnoses and all orders for this visit:    1. Dysmenorrhea (Primary)    2. Menorrhagia with regular cycle      Patient Instructions   Patient and daughter are counseled regarding proceeding with hysterectomy for definitive treatment of menorrhagia and dysmenorrhea.  We discussed " retaining the ovaries versus removal of ovaries and the benefits/consequences of both of those options.  At this time they want to think about whether patient desires removal of ovaries also and will call back in a few days with the decision.  However, patient definitely wants hysterectomy.             This note was electronically signed.    Carmen Jasmine PA-C   May 26, 2022

## 2022-05-26 NOTE — PATIENT INSTRUCTIONS
Patient and daughter are counseled regarding proceeding with hysterectomy for definitive treatment of menorrhagia and dysmenorrhea.  We discussed retaining the ovaries versus removal of ovaries and the benefits/consequences of both of those options.  At this time they want to think about whether patient desires removal of ovaries also and will call back in a few days with the decision.  However, patient definitely wants hysterectomy.

## 2022-05-27 LAB — VIT B12 BLD-MCNC: 285 PG/ML (ref 211–946)

## 2022-06-21 DIAGNOSIS — D50.0 IRON DEFICIENCY ANEMIA DUE TO CHRONIC BLOOD LOSS: ICD-10-CM

## 2022-06-21 DIAGNOSIS — E53.8 VITAMIN B 12 DEFICIENCY: Primary | ICD-10-CM

## 2022-06-21 RX ORDER — CYANOCOBALAMIN 1000 UG/ML
1000 INJECTION, SOLUTION INTRAMUSCULAR; SUBCUTANEOUS ONCE
Status: CANCELLED | OUTPATIENT
Start: 2022-08-18

## 2022-06-21 RX ORDER — CYANOCOBALAMIN 1000 UG/ML
1000 INJECTION, SOLUTION INTRAMUSCULAR; SUBCUTANEOUS ONCE
Status: CANCELLED | OUTPATIENT
Start: 2022-07-21

## 2022-06-21 RX ORDER — CYANOCOBALAMIN 1000 UG/ML
1000 INJECTION, SOLUTION INTRAMUSCULAR; SUBCUTANEOUS ONCE
Status: CANCELLED | OUTPATIENT
Start: 2022-06-23

## 2022-06-23 ENCOUNTER — APPOINTMENT (OUTPATIENT)
Dept: ONCOLOGY | Facility: HOSPITAL | Age: 42
End: 2022-06-23

## 2022-06-27 ENCOUNTER — INFUSION (OUTPATIENT)
Dept: ONCOLOGY | Facility: HOSPITAL | Age: 42
End: 2022-06-27

## 2022-06-27 VITALS
HEART RATE: 79 BPM | OXYGEN SATURATION: 100 % | DIASTOLIC BLOOD PRESSURE: 90 MMHG | SYSTOLIC BLOOD PRESSURE: 136 MMHG | BODY MASS INDEX: 24.32 KG/M2 | WEIGHT: 137.3 LBS | TEMPERATURE: 97.9 F | RESPIRATION RATE: 16 BRPM

## 2022-06-27 DIAGNOSIS — D50.0 IRON DEFICIENCY ANEMIA DUE TO CHRONIC BLOOD LOSS: ICD-10-CM

## 2022-06-27 DIAGNOSIS — E53.8 VITAMIN B 12 DEFICIENCY: Primary | ICD-10-CM

## 2022-06-27 PROCEDURE — 25010000002 CYANOCOBALAMIN PER 1000 MCG: Performed by: NURSE PRACTITIONER

## 2022-06-27 PROCEDURE — 96372 THER/PROPH/DIAG INJ SC/IM: CPT

## 2022-06-27 RX ORDER — CYANOCOBALAMIN 1000 UG/ML
1000 INJECTION, SOLUTION INTRAMUSCULAR; SUBCUTANEOUS ONCE
Status: COMPLETED | OUTPATIENT
Start: 2022-06-27 | End: 2022-06-27

## 2022-06-27 RX ADMIN — CYANOCOBALAMIN 1000 MCG: 1000 INJECTION, SOLUTION INTRAMUSCULAR at 14:09

## 2022-07-08 ENCOUNTER — APPOINTMENT (OUTPATIENT)
Dept: MAMMOGRAPHY | Facility: HOSPITAL | Age: 42
End: 2022-07-08

## 2022-07-20 ENCOUNTER — INFUSION (OUTPATIENT)
Dept: ONCOLOGY | Facility: HOSPITAL | Age: 42
End: 2022-07-20

## 2022-07-20 VITALS
HEART RATE: 78 BPM | SYSTOLIC BLOOD PRESSURE: 120 MMHG | RESPIRATION RATE: 18 BRPM | DIASTOLIC BLOOD PRESSURE: 86 MMHG | TEMPERATURE: 98.7 F

## 2022-07-20 DIAGNOSIS — D50.0 IRON DEFICIENCY ANEMIA DUE TO CHRONIC BLOOD LOSS: ICD-10-CM

## 2022-07-20 DIAGNOSIS — E53.8 VITAMIN B 12 DEFICIENCY: Primary | ICD-10-CM

## 2022-07-20 PROCEDURE — 96372 THER/PROPH/DIAG INJ SC/IM: CPT

## 2022-07-20 PROCEDURE — 25010000002 CYANOCOBALAMIN PER 1000 MCG: Performed by: NURSE PRACTITIONER

## 2022-07-20 RX ORDER — CYANOCOBALAMIN 1000 UG/ML
1000 INJECTION, SOLUTION INTRAMUSCULAR; SUBCUTANEOUS ONCE
Status: COMPLETED | OUTPATIENT
Start: 2022-07-20 | End: 2022-07-20

## 2022-07-20 RX ADMIN — CYANOCOBALAMIN 1000 MCG: 1000 INJECTION, SOLUTION INTRAMUSCULAR at 14:16

## 2022-08-17 ENCOUNTER — INFUSION (OUTPATIENT)
Dept: ONCOLOGY | Facility: HOSPITAL | Age: 42
End: 2022-08-17

## 2022-08-17 DIAGNOSIS — D50.0 IRON DEFICIENCY ANEMIA DUE TO CHRONIC BLOOD LOSS: ICD-10-CM

## 2022-08-17 DIAGNOSIS — E53.8 VITAMIN B 12 DEFICIENCY: Primary | ICD-10-CM

## 2022-08-17 PROCEDURE — 25010000002 CYANOCOBALAMIN PER 1000 MCG: Performed by: NURSE PRACTITIONER

## 2022-08-17 PROCEDURE — 96372 THER/PROPH/DIAG INJ SC/IM: CPT

## 2022-08-17 RX ORDER — CYANOCOBALAMIN 1000 UG/ML
1000 INJECTION, SOLUTION INTRAMUSCULAR; SUBCUTANEOUS ONCE
Status: COMPLETED | OUTPATIENT
Start: 2022-08-17 | End: 2022-08-17

## 2022-08-17 RX ADMIN — CYANOCOBALAMIN 1000 MCG: 1000 INJECTION, SOLUTION INTRAMUSCULAR at 14:06

## 2022-09-13 DIAGNOSIS — D50.0 IRON DEFICIENCY ANEMIA DUE TO CHRONIC BLOOD LOSS: ICD-10-CM

## 2022-09-13 DIAGNOSIS — E53.8 VITAMIN B 12 DEFICIENCY: Primary | ICD-10-CM

## 2022-09-13 RX ORDER — CYANOCOBALAMIN 1000 UG/ML
1000 INJECTION, SOLUTION INTRAMUSCULAR; SUBCUTANEOUS ONCE
Status: CANCELLED | OUTPATIENT
Start: 2022-11-04

## 2022-09-13 RX ORDER — CYANOCOBALAMIN 1000 UG/ML
1000 INJECTION, SOLUTION INTRAMUSCULAR; SUBCUTANEOUS ONCE
Status: CANCELLED | OUTPATIENT
Start: 2022-09-15

## 2022-09-13 RX ORDER — CYANOCOBALAMIN 1000 UG/ML
1000 INJECTION, SOLUTION INTRAMUSCULAR; SUBCUTANEOUS ONCE
Status: CANCELLED | OUTPATIENT
Start: 2022-12-31

## 2022-10-07 ENCOUNTER — INFUSION (OUTPATIENT)
Dept: ONCOLOGY | Facility: HOSPITAL | Age: 42
End: 2022-10-07

## 2022-10-07 VITALS
TEMPERATURE: 98.2 F | HEART RATE: 76 BPM | SYSTOLIC BLOOD PRESSURE: 136 MMHG | DIASTOLIC BLOOD PRESSURE: 95 MMHG | RESPIRATION RATE: 18 BRPM

## 2022-10-07 DIAGNOSIS — D50.0 IRON DEFICIENCY ANEMIA DUE TO CHRONIC BLOOD LOSS: Primary | ICD-10-CM

## 2022-10-07 DIAGNOSIS — K90.9 IRON MALABSORPTION: ICD-10-CM

## 2022-10-07 DIAGNOSIS — E53.8 VITAMIN B 12 DEFICIENCY: ICD-10-CM

## 2022-10-07 LAB
BASOPHILS # BLD AUTO: 0.03 10*3/MM3 (ref 0–0.2)
BASOPHILS NFR BLD AUTO: 0.7 % (ref 0–1.5)
DEPRECATED RDW RBC AUTO: 36.3 FL (ref 37–54)
EOSINOPHIL # BLD AUTO: 0.06 10*3/MM3 (ref 0–0.4)
EOSINOPHIL NFR BLD AUTO: 1.4 % (ref 0.3–6.2)
ERYTHROCYTE [DISTWIDTH] IN BLOOD BY AUTOMATED COUNT: 11.9 % (ref 12.3–15.4)
FERRITIN SERPL-MCNC: 10.7 NG/ML (ref 13–150)
HCT VFR BLD AUTO: 32.9 % (ref 34–46.6)
HGB BLD-MCNC: 10.8 G/DL (ref 12–15.9)
IMM GRANULOCYTES # BLD AUTO: 0.01 10*3/MM3 (ref 0–0.05)
IMM GRANULOCYTES NFR BLD AUTO: 0.2 % (ref 0–0.5)
IRON 24H UR-MRATE: 97 MCG/DL (ref 37–145)
IRON SATN MFR SERPL: 19 % (ref 20–50)
LYMPHOCYTES # BLD AUTO: 0.97 10*3/MM3 (ref 0.7–3.1)
LYMPHOCYTES NFR BLD AUTO: 22.4 % (ref 19.6–45.3)
MCH RBC QN AUTO: 27.9 PG (ref 26.6–33)
MCHC RBC AUTO-ENTMCNC: 32.8 G/DL (ref 31.5–35.7)
MCV RBC AUTO: 85 FL (ref 79–97)
MONOCYTES # BLD AUTO: 0.27 10*3/MM3 (ref 0.1–0.9)
MONOCYTES NFR BLD AUTO: 6.2 % (ref 5–12)
NEUTROPHILS NFR BLD AUTO: 2.99 10*3/MM3 (ref 1.7–7)
NEUTROPHILS NFR BLD AUTO: 69.1 % (ref 42.7–76)
NRBC BLD AUTO-RTO: 0 /100 WBC (ref 0–0.2)
PLATELET # BLD AUTO: 192 10*3/MM3 (ref 140–450)
PMV BLD AUTO: 10.6 FL (ref 6–12)
RBC # BLD AUTO: 3.87 10*6/MM3 (ref 3.77–5.28)
TIBC SERPL-MCNC: 510 MCG/DL (ref 298–536)
TRANSFERRIN SERPL-MCNC: 342 MG/DL (ref 200–360)
WBC NRBC COR # BLD: 4.33 10*3/MM3 (ref 3.4–10.8)

## 2022-10-07 PROCEDURE — 96372 THER/PROPH/DIAG INJ SC/IM: CPT

## 2022-10-07 PROCEDURE — 84466 ASSAY OF TRANSFERRIN: CPT

## 2022-10-07 PROCEDURE — 83540 ASSAY OF IRON: CPT

## 2022-10-07 PROCEDURE — 82607 VITAMIN B-12: CPT

## 2022-10-07 PROCEDURE — 85025 COMPLETE CBC W/AUTO DIFF WBC: CPT

## 2022-10-07 PROCEDURE — 36415 COLL VENOUS BLD VENIPUNCTURE: CPT

## 2022-10-07 PROCEDURE — 25010000002 CYANOCOBALAMIN PER 1000 MCG: Performed by: NURSE PRACTITIONER

## 2022-10-07 PROCEDURE — 82728 ASSAY OF FERRITIN: CPT

## 2022-10-07 RX ORDER — CYANOCOBALAMIN 1000 UG/ML
1000 INJECTION, SOLUTION INTRAMUSCULAR; SUBCUTANEOUS ONCE
Status: COMPLETED | OUTPATIENT
Start: 2022-10-07 | End: 2022-10-07

## 2022-10-07 RX ADMIN — CYANOCOBALAMIN 1000 MCG: 1000 INJECTION, SOLUTION INTRAMUSCULAR at 15:06

## 2022-10-08 LAB — VIT B12 BLD-MCNC: 308 PG/ML (ref 211–946)

## 2022-10-10 NOTE — PROGRESS NOTES
Left message for patient that she needs IV iron. Please keep scheduled appointment for follow up.

## 2022-10-14 ENCOUNTER — INFUSION (OUTPATIENT)
Dept: ONCOLOGY | Facility: HOSPITAL | Age: 42
End: 2022-10-14

## 2022-10-14 VITALS — SYSTOLIC BLOOD PRESSURE: 145 MMHG | DIASTOLIC BLOOD PRESSURE: 95 MMHG | HEART RATE: 73 BPM

## 2022-10-14 DIAGNOSIS — D50.0 IRON DEFICIENCY ANEMIA DUE TO CHRONIC BLOOD LOSS: ICD-10-CM

## 2022-10-14 DIAGNOSIS — K90.9 IRON MALABSORPTION: Primary | ICD-10-CM

## 2022-10-14 PROCEDURE — 25010000002 FERRIC CARBOXYMALTOSE 750 MG/15ML SOLUTION 15 ML VIAL: Performed by: NURSE PRACTITIONER

## 2022-10-14 PROCEDURE — 96365 THER/PROPH/DIAG IV INF INIT: CPT

## 2022-10-14 PROCEDURE — 96375 TX/PRO/DX INJ NEW DRUG ADDON: CPT

## 2022-10-14 PROCEDURE — 63710000001 DIPHENHYDRAMINE PER 50 MG: Performed by: NURSE PRACTITIONER

## 2022-10-14 RX ORDER — FAMOTIDINE 10 MG/ML
20 INJECTION, SOLUTION INTRAVENOUS ONCE
Status: CANCELLED | OUTPATIENT
Start: 2022-10-20

## 2022-10-14 RX ORDER — SODIUM CHLORIDE 9 MG/ML
250 INJECTION, SOLUTION INTRAVENOUS ONCE
Status: CANCELLED | OUTPATIENT
Start: 2022-10-14

## 2022-10-14 RX ORDER — SODIUM CHLORIDE 9 MG/ML
250 INJECTION, SOLUTION INTRAVENOUS ONCE
Status: DISCONTINUED | OUTPATIENT
Start: 2022-10-14 | End: 2022-10-14 | Stop reason: HOSPADM

## 2022-10-14 RX ORDER — DIPHENHYDRAMINE HCL 25 MG
25 CAPSULE ORAL ONCE
Status: COMPLETED | OUTPATIENT
Start: 2022-10-14 | End: 2022-10-14

## 2022-10-14 RX ORDER — FAMOTIDINE 10 MG/ML
20 INJECTION, SOLUTION INTRAVENOUS ONCE
Status: CANCELLED | OUTPATIENT
Start: 2022-10-14

## 2022-10-14 RX ORDER — DIPHENHYDRAMINE HCL 25 MG
25 CAPSULE ORAL ONCE
Status: CANCELLED | OUTPATIENT
Start: 2022-10-14

## 2022-10-14 RX ORDER — SODIUM CHLORIDE 9 MG/ML
250 INJECTION, SOLUTION INTRAVENOUS ONCE
Status: CANCELLED | OUTPATIENT
Start: 2022-10-20

## 2022-10-14 RX ORDER — DIPHENHYDRAMINE HCL 25 MG
25 CAPSULE ORAL ONCE
Status: CANCELLED | OUTPATIENT
Start: 2022-10-20

## 2022-10-14 RX ORDER — FAMOTIDINE 10 MG/ML
20 INJECTION, SOLUTION INTRAVENOUS ONCE
Status: COMPLETED | OUTPATIENT
Start: 2022-10-14 | End: 2022-10-14

## 2022-10-14 RX ADMIN — FAMOTIDINE 20 MG: 10 INJECTION INTRAVENOUS at 14:48

## 2022-10-14 RX ADMIN — FERRIC CARBOXYMALTOSE INJECTION 750 MG: 50 INJECTION, SOLUTION INTRAVENOUS at 15:09

## 2022-10-14 RX ADMIN — DIPHENHYDRAMINE HYDROCHLORIDE 25 MG: 25 CAPSULE ORAL at 14:50

## 2022-10-21 ENCOUNTER — INFUSION (OUTPATIENT)
Dept: ONCOLOGY | Facility: HOSPITAL | Age: 42
End: 2022-10-21

## 2022-10-21 VITALS
WEIGHT: 141.1 LBS | HEART RATE: 88 BPM | DIASTOLIC BLOOD PRESSURE: 83 MMHG | RESPIRATION RATE: 12 BRPM | SYSTOLIC BLOOD PRESSURE: 134 MMHG | BODY MASS INDEX: 24.99 KG/M2 | TEMPERATURE: 98.6 F | OXYGEN SATURATION: 100 %

## 2022-10-21 DIAGNOSIS — K90.9 IRON MALABSORPTION: Primary | ICD-10-CM

## 2022-10-21 DIAGNOSIS — D50.0 IRON DEFICIENCY ANEMIA DUE TO CHRONIC BLOOD LOSS: ICD-10-CM

## 2022-10-21 PROCEDURE — 63710000001 DIPHENHYDRAMINE PER 50 MG: Performed by: NURSE PRACTITIONER

## 2022-10-21 PROCEDURE — 96375 TX/PRO/DX INJ NEW DRUG ADDON: CPT

## 2022-10-21 PROCEDURE — 25010000002 FERRIC CARBOXYMALTOSE 750 MG/15ML SOLUTION 15 ML VIAL: Performed by: NURSE PRACTITIONER

## 2022-10-21 PROCEDURE — 96365 THER/PROPH/DIAG IV INF INIT: CPT

## 2022-10-21 RX ORDER — FAMOTIDINE 10 MG/ML
20 INJECTION, SOLUTION INTRAVENOUS ONCE
Status: COMPLETED | OUTPATIENT
Start: 2022-10-21 | End: 2022-10-21

## 2022-10-21 RX ORDER — DIPHENHYDRAMINE HCL 25 MG
25 CAPSULE ORAL ONCE
Status: COMPLETED | OUTPATIENT
Start: 2022-10-21 | End: 2022-10-21

## 2022-10-21 RX ORDER — SODIUM CHLORIDE 9 MG/ML
250 INJECTION, SOLUTION INTRAVENOUS ONCE
Status: DISCONTINUED | OUTPATIENT
Start: 2022-10-21 | End: 2022-10-21 | Stop reason: HOSPADM

## 2022-10-21 RX ADMIN — FERRIC CARBOXYMALTOSE INJECTION 750 MG: 50 INJECTION, SOLUTION INTRAVENOUS at 13:45

## 2022-10-21 RX ADMIN — DIPHENHYDRAMINE HYDROCHLORIDE 25 MG: 25 CAPSULE ORAL at 13:33

## 2022-10-21 RX ADMIN — FAMOTIDINE 20 MG: 10 INJECTION INTRAVENOUS at 13:34

## 2022-12-14 ENCOUNTER — APPOINTMENT (OUTPATIENT)
Dept: ONCOLOGY | Facility: HOSPITAL | Age: 42
End: 2022-12-14
Payer: COMMERCIAL

## 2022-12-30 ENCOUNTER — INFUSION (OUTPATIENT)
Dept: ONCOLOGY | Facility: HOSPITAL | Age: 42
End: 2022-12-30
Payer: COMMERCIAL

## 2022-12-30 VITALS
RESPIRATION RATE: 18 BRPM | TEMPERATURE: 98.9 F | SYSTOLIC BLOOD PRESSURE: 140 MMHG | DIASTOLIC BLOOD PRESSURE: 95 MMHG | HEART RATE: 77 BPM

## 2022-12-30 DIAGNOSIS — E53.8 VITAMIN B 12 DEFICIENCY: Primary | ICD-10-CM

## 2022-12-30 DIAGNOSIS — D50.0 IRON DEFICIENCY ANEMIA DUE TO CHRONIC BLOOD LOSS: ICD-10-CM

## 2022-12-30 PROCEDURE — 96372 THER/PROPH/DIAG INJ SC/IM: CPT

## 2022-12-30 PROCEDURE — 25010000002 CYANOCOBALAMIN PER 1000 MCG: Performed by: NURSE PRACTITIONER

## 2022-12-30 RX ORDER — CYANOCOBALAMIN 1000 UG/ML
1000 INJECTION, SOLUTION INTRAMUSCULAR; SUBCUTANEOUS ONCE
Status: COMPLETED | OUTPATIENT
Start: 2022-12-30 | End: 2022-12-30

## 2022-12-30 RX ADMIN — CYANOCOBALAMIN 1000 MCG: 1000 INJECTION, SOLUTION INTRAMUSCULAR at 09:20

## 2023-01-24 DIAGNOSIS — D50.0 IRON DEFICIENCY ANEMIA DUE TO CHRONIC BLOOD LOSS: ICD-10-CM

## 2023-01-24 DIAGNOSIS — E53.8 VITAMIN B 12 DEFICIENCY: Primary | ICD-10-CM

## 2023-02-13 ENCOUNTER — INFUSION (OUTPATIENT)
Dept: ONCOLOGY | Facility: HOSPITAL | Age: 43
End: 2023-02-13
Payer: COMMERCIAL

## 2023-02-13 VITALS
DIASTOLIC BLOOD PRESSURE: 91 MMHG | SYSTOLIC BLOOD PRESSURE: 125 MMHG | TEMPERATURE: 97.8 F | RESPIRATION RATE: 12 BRPM | HEART RATE: 80 BPM

## 2023-02-13 DIAGNOSIS — D50.0 IRON DEFICIENCY ANEMIA DUE TO CHRONIC BLOOD LOSS: ICD-10-CM

## 2023-02-13 DIAGNOSIS — E53.8 VITAMIN B 12 DEFICIENCY: Primary | ICD-10-CM

## 2023-02-13 PROCEDURE — 25010000002 CYANOCOBALAMIN PER 1000 MCG: Performed by: NURSE PRACTITIONER

## 2023-02-13 PROCEDURE — 96372 THER/PROPH/DIAG INJ SC/IM: CPT

## 2023-02-13 RX ORDER — CYANOCOBALAMIN 1000 UG/ML
1000 INJECTION, SOLUTION INTRAMUSCULAR; SUBCUTANEOUS ONCE
Status: COMPLETED | OUTPATIENT
Start: 2023-02-13 | End: 2023-02-13

## 2023-02-13 RX ADMIN — CYANOCOBALAMIN 1000 MCG: 1000 INJECTION, SOLUTION INTRAMUSCULAR at 15:26

## 2023-03-15 ENCOUNTER — INFUSION (OUTPATIENT)
Dept: ONCOLOGY | Facility: HOSPITAL | Age: 43
End: 2023-03-15
Payer: COMMERCIAL

## 2023-03-15 ENCOUNTER — OFFICE VISIT (OUTPATIENT)
Dept: ONCOLOGY | Facility: CLINIC | Age: 43
End: 2023-03-15
Payer: COMMERCIAL

## 2023-03-15 VITALS
HEART RATE: 94 BPM | DIASTOLIC BLOOD PRESSURE: 99 MMHG | HEIGHT: 63 IN | BODY MASS INDEX: 24.8 KG/M2 | SYSTOLIC BLOOD PRESSURE: 161 MMHG | TEMPERATURE: 97.5 F | WEIGHT: 140 LBS | RESPIRATION RATE: 14 BRPM | OXYGEN SATURATION: 99 %

## 2023-03-15 DIAGNOSIS — K90.9 IRON MALABSORPTION: ICD-10-CM

## 2023-03-15 DIAGNOSIS — N92.0 POLYMENORRHEA: ICD-10-CM

## 2023-03-15 DIAGNOSIS — E53.8 VITAMIN B 12 DEFICIENCY: ICD-10-CM

## 2023-03-15 DIAGNOSIS — D50.0 IRON DEFICIENCY ANEMIA DUE TO CHRONIC BLOOD LOSS: ICD-10-CM

## 2023-03-15 DIAGNOSIS — D50.0 IRON DEFICIENCY ANEMIA DUE TO CHRONIC BLOOD LOSS: Primary | ICD-10-CM

## 2023-03-15 DIAGNOSIS — E53.8 VITAMIN B 12 DEFICIENCY: Primary | ICD-10-CM

## 2023-03-15 LAB
ALBUMIN SERPL-MCNC: 4.1 G/DL (ref 3.5–5.2)
ALBUMIN/GLOB SERPL: 1.4 G/DL
ALP SERPL-CCNC: 80 U/L (ref 39–117)
ALT SERPL W P-5'-P-CCNC: 10 U/L (ref 1–33)
ANION GAP SERPL CALCULATED.3IONS-SCNC: 10.3 MMOL/L (ref 5–15)
AST SERPL-CCNC: 13 U/L (ref 1–32)
BASOPHILS # BLD AUTO: 0.04 10*3/MM3 (ref 0–0.2)
BASOPHILS NFR BLD AUTO: 0.8 % (ref 0–1.5)
BILIRUB SERPL-MCNC: 0.3 MG/DL (ref 0–1.2)
BUN SERPL-MCNC: 9 MG/DL (ref 6–20)
BUN/CREAT SERPL: 17.6 (ref 7–25)
CALCIUM SPEC-SCNC: 9.2 MG/DL (ref 8.6–10.5)
CHLORIDE SERPL-SCNC: 100 MMOL/L (ref 98–107)
CO2 SERPL-SCNC: 24.7 MMOL/L (ref 22–29)
CREAT SERPL-MCNC: 0.51 MG/DL (ref 0.57–1)
DEPRECATED RDW RBC AUTO: 38.2 FL (ref 37–54)
EGFRCR SERPLBLD CKD-EPI 2021: 117.5 ML/MIN/1.73
EOSINOPHIL # BLD AUTO: 0.08 10*3/MM3 (ref 0–0.4)
EOSINOPHIL NFR BLD AUTO: 1.6 % (ref 0.3–6.2)
ERYTHROCYTE [DISTWIDTH] IN BLOOD BY AUTOMATED COUNT: 11.9 % (ref 12.3–15.4)
FERRITIN SERPL-MCNC: 46.46 NG/ML (ref 13–150)
GLOBULIN UR ELPH-MCNC: 2.9 GM/DL
GLUCOSE SERPL-MCNC: 86 MG/DL (ref 65–99)
HBA1C MFR BLD: 4.8 % (ref 4.8–5.6)
HCT VFR BLD AUTO: 34.5 % (ref 34–46.6)
HGB BLD-MCNC: 11.8 G/DL (ref 12–15.9)
IMM GRANULOCYTES # BLD AUTO: 0.01 10*3/MM3 (ref 0–0.05)
IMM GRANULOCYTES NFR BLD AUTO: 0.2 % (ref 0–0.5)
IRON 24H UR-MRATE: 48 MCG/DL (ref 37–145)
IRON SATN MFR SERPL: 12 % (ref 20–50)
LYMPHOCYTES # BLD AUTO: 1.24 10*3/MM3 (ref 0.7–3.1)
LYMPHOCYTES NFR BLD AUTO: 24.9 % (ref 19.6–45.3)
MCH RBC QN AUTO: 29.9 PG (ref 26.6–33)
MCHC RBC AUTO-ENTMCNC: 34.2 G/DL (ref 31.5–35.7)
MCV RBC AUTO: 87.6 FL (ref 79–97)
MONOCYTES # BLD AUTO: 0.25 10*3/MM3 (ref 0.1–0.9)
MONOCYTES NFR BLD AUTO: 5 % (ref 5–12)
NEUTROPHILS NFR BLD AUTO: 3.36 10*3/MM3 (ref 1.7–7)
NEUTROPHILS NFR BLD AUTO: 67.5 % (ref 42.7–76)
NRBC BLD AUTO-RTO: 0 /100 WBC (ref 0–0.2)
PLATELET # BLD AUTO: 211 10*3/MM3 (ref 140–450)
PMV BLD AUTO: 9.4 FL (ref 6–12)
POTASSIUM SERPL-SCNC: 3.4 MMOL/L (ref 3.5–5.2)
PROT SERPL-MCNC: 7 G/DL (ref 6–8.5)
RBC # BLD AUTO: 3.94 10*6/MM3 (ref 3.77–5.28)
SODIUM SERPL-SCNC: 135 MMOL/L (ref 136–145)
T4 FREE SERPL-MCNC: 1.14 NG/DL (ref 0.93–1.7)
TIBC SERPL-MCNC: 396 MCG/DL (ref 298–536)
TRANSFERRIN SERPL-MCNC: 266 MG/DL (ref 200–360)
TSH SERPL DL<=0.05 MIU/L-ACNC: 2.39 UIU/ML (ref 0.27–4.2)
WBC NRBC COR # BLD: 4.98 10*3/MM3 (ref 3.4–10.8)

## 2023-03-15 PROCEDURE — 1126F AMNT PAIN NOTED NONE PRSNT: CPT | Performed by: INTERNAL MEDICINE

## 2023-03-15 PROCEDURE — 83540 ASSAY OF IRON: CPT

## 2023-03-15 PROCEDURE — 25010000002 CYANOCOBALAMIN PER 1000 MCG: Performed by: INTERNAL MEDICINE

## 2023-03-15 PROCEDURE — 82306 VITAMIN D 25 HYDROXY: CPT

## 2023-03-15 PROCEDURE — 36415 COLL VENOUS BLD VENIPUNCTURE: CPT

## 2023-03-15 PROCEDURE — 83036 HEMOGLOBIN GLYCOSYLATED A1C: CPT

## 2023-03-15 PROCEDURE — 82728 ASSAY OF FERRITIN: CPT

## 2023-03-15 PROCEDURE — 96372 THER/PROPH/DIAG INJ SC/IM: CPT

## 2023-03-15 PROCEDURE — 99214 OFFICE O/P EST MOD 30 MIN: CPT | Performed by: INTERNAL MEDICINE

## 2023-03-15 PROCEDURE — 82746 ASSAY OF FOLIC ACID SERUM: CPT

## 2023-03-15 PROCEDURE — 82607 VITAMIN B-12: CPT

## 2023-03-15 PROCEDURE — 80050 GENERAL HEALTH PANEL: CPT

## 2023-03-15 PROCEDURE — 84439 ASSAY OF FREE THYROXINE: CPT

## 2023-03-15 PROCEDURE — 84466 ASSAY OF TRANSFERRIN: CPT

## 2023-03-15 RX ORDER — CYANOCOBALAMIN 1000 UG/ML
INJECTION, SOLUTION INTRAMUSCULAR; SUBCUTANEOUS
COMMUNITY
Start: 2023-02-01

## 2023-03-15 RX ORDER — DIPHENHYDRAMINE HCL 25 MG
25 CAPSULE ORAL ONCE
Status: CANCELLED | OUTPATIENT
Start: 2023-03-29

## 2023-03-15 RX ORDER — CYANOCOBALAMIN 1000 UG/ML
1000 INJECTION, SOLUTION INTRAMUSCULAR; SUBCUTANEOUS ONCE
Status: CANCELLED | OUTPATIENT
Start: 2023-03-22

## 2023-03-15 RX ORDER — SENNOSIDES 8.6 MG
CAPSULE ORAL
COMMUNITY
Start: 2023-02-01

## 2023-03-15 RX ORDER — CYANOCOBALAMIN 1000 UG/ML
1000 INJECTION, SOLUTION INTRAMUSCULAR; SUBCUTANEOUS ONCE
OUTPATIENT
Start: 2023-04-19

## 2023-03-15 RX ORDER — FAMOTIDINE 10 MG/ML
20 INJECTION, SOLUTION INTRAVENOUS ONCE
Status: CANCELLED | OUTPATIENT
Start: 2023-03-29

## 2023-03-15 RX ORDER — DIPHENHYDRAMINE HCL 25 MG
25 CAPSULE ORAL ONCE
Status: CANCELLED | OUTPATIENT
Start: 2023-03-22

## 2023-03-15 RX ORDER — SODIUM CHLORIDE 9 MG/ML
250 INJECTION, SOLUTION INTRAVENOUS ONCE
Status: CANCELLED | OUTPATIENT
Start: 2023-03-29

## 2023-03-15 RX ORDER — SYRINGE WITH NEEDLE, 1 ML 25GX5/8"
SYRINGE, EMPTY DISPOSABLE MISCELLANEOUS
COMMUNITY
Start: 2023-02-01

## 2023-03-15 RX ORDER — SODIUM CHLORIDE 9 MG/ML
250 INJECTION, SOLUTION INTRAVENOUS ONCE
Status: CANCELLED | OUTPATIENT
Start: 2023-03-22

## 2023-03-15 RX ORDER — FAMOTIDINE 10 MG/ML
20 INJECTION, SOLUTION INTRAVENOUS ONCE
Status: CANCELLED | OUTPATIENT
Start: 2023-03-22

## 2023-03-15 RX ORDER — METOCLOPRAMIDE 5 MG/1
TABLET ORAL
COMMUNITY
Start: 2023-02-01

## 2023-03-15 RX ORDER — CYANOCOBALAMIN 1000 UG/ML
1000 INJECTION, SOLUTION INTRAMUSCULAR; SUBCUTANEOUS ONCE
Status: COMPLETED | OUTPATIENT
Start: 2023-03-15 | End: 2023-03-15

## 2023-03-15 RX ORDER — CYANOCOBALAMIN 1000 UG/ML
1000 INJECTION, SOLUTION INTRAMUSCULAR; SUBCUTANEOUS ONCE
OUTPATIENT
Start: 2023-05-17

## 2023-03-15 RX ORDER — OMEPRAZOLE 40 MG/1
CAPSULE, DELAYED RELEASE ORAL
COMMUNITY
Start: 2023-03-14

## 2023-03-15 RX ADMIN — CYANOCOBALAMIN 1000 MCG: 1000 INJECTION, SOLUTION INTRAMUSCULAR at 15:24

## 2023-03-15 NOTE — PROGRESS NOTES
DATE OF VISIT: 3/15/2023    REASON FOR VISIT: Followup for normocytic anemia     PROBLEM LIST:  1.  Normocytic anemia  2.  Iron deficiency:  A.  Negative GI evaluation EGD December 11, 2020 and colonoscopy March 5, 2020  3.  Polymenorrhagia  A.  Improved after endometrial ablation done August 25, 2021  B.  Pathology report revealed no evidence of malignancy  C.  Worsening on 5/26/2022.  Considering partial hysterectomy versus total hysterectomy  4.  Heartburn    HISTORY OF PRESENT ILLNESS: The patient is a very pleasant 43 y.o. female  with past medical history significant for normocytic anemia diagnosed May 2020.  Iron deficiency anemia noted 5/18/2021.  She had IV iron at that time.  Improved anemia.  The patient is here today for follow-up    SUBJECTIVE: Ms. Beasley is here today with her daughter.  She is complaining of fatigue but has been having regular menstrual cycles.    Past History:  Medical History: has a past medical history of Anemia, Body piercing, COVID-19 vaccine series completed, GERD (gastroesophageal reflux disease), History of blood transfusion, Iron deficiency, and Wears glasses.   Surgical History: has a past surgical history that includes Esophagogastroduodenoscopy (N/A, 12/11/2020); Colonoscopy (N/A, 3/5/2021); d & c hysteroscopy (N/A, 8/25/2021); Dilation and curettage of uterus; and Hysteroscopy.   Family History: family history includes No Known Problems in her father and mother.   Social History: reports that she has never smoked. She has never used smokeless tobacco. She reports that she does not drink alcohol and does not use drugs.    (Not in a hospital admission)     Allergies: Patient has no known allergies.     Review of Systems   Constitutional: Positive for fatigue.   Endocrine: Positive for heat intolerance.   Genitourinary: Positive for menstrual problem.   Musculoskeletal: Positive for arthralgias.       PHYSICAL EXAMINATION:   /99   Pulse 94   Temp 97.5 °F (36.4 °C)  "(Infrared)   Resp 14   Ht 160 cm (62.99\")   Wt 63.5 kg (140 lb)   SpO2 99%   BMI 24.81 kg/m²    Pain Score    03/15/23 1420   PainSc: 0-No pain        ECOG score: 0            ECOG Performance Status: 1 - Symptomatic but completely ambulatory      General Appearance:      alert, cooperative, no apparent distress and appears stated age   Lungs:   Clear to auscultation bilaterally; respirations regular, even, and unlabored bilaterally   Heart:  Regular rate and rhythm, no murmurs appreciated   Abdomen:   Soft, non-tender, non-distended and no organomegaly                 No visits with results within 2 Week(s) from this visit.   Latest known visit with results is:   Infusion on 10/07/2022   Component Date Value Ref Range Status   • Ferritin 10/07/2022 10.70 (L)  13.00 - 150.00 ng/mL Final   • Vitamin B-12 10/07/2022 308  211 - 946 pg/mL Final   • Iron 10/07/2022 97  37 - 145 mcg/dL Final   • Iron Saturation 10/07/2022 19 (L)  20 - 50 % Final   • Transferrin 10/07/2022 342  200 - 360 mg/dL Final   • TIBC 10/07/2022 510  298 - 536 mcg/dL Final   • WBC 10/07/2022 4.33  3.40 - 10.80 10*3/mm3 Final   • RBC 10/07/2022 3.87  3.77 - 5.28 10*6/mm3 Final   • Hemoglobin 10/07/2022 10.8 (L)  12.0 - 15.9 g/dL Final   • Hematocrit 10/07/2022 32.9 (L)  34.0 - 46.6 % Final   • MCV 10/07/2022 85.0  79.0 - 97.0 fL Final   • MCH 10/07/2022 27.9  26.6 - 33.0 pg Final   • MCHC 10/07/2022 32.8  31.5 - 35.7 g/dL Final   • RDW 10/07/2022 11.9 (L)  12.3 - 15.4 % Final   • RDW-SD 10/07/2022 36.3 (L)  37.0 - 54.0 fl Final   • MPV 10/07/2022 10.6  6.0 - 12.0 fL Final   • Platelets 10/07/2022 192  140 - 450 10*3/mm3 Final   • Neutrophil % 10/07/2022 69.1  42.7 - 76.0 % Final   • Lymphocyte % 10/07/2022 22.4  19.6 - 45.3 % Final   • Monocyte % 10/07/2022 6.2  5.0 - 12.0 % Final   • Eosinophil % 10/07/2022 1.4  0.3 - 6.2 % Final   • Basophil % 10/07/2022 0.7  0.0 - 1.5 % Final   • Immature Grans % 10/07/2022 0.2  0.0 - 0.5 % Final   • " Neutrophils, Absolute 10/07/2022 2.99  1.70 - 7.00 10*3/mm3 Final   • Lymphocytes, Absolute 10/07/2022 0.97  0.70 - 3.10 10*3/mm3 Final   • Monocytes, Absolute 10/07/2022 0.27  0.10 - 0.90 10*3/mm3 Final   • Eosinophils, Absolute 10/07/2022 0.06  0.00 - 0.40 10*3/mm3 Final   • Basophils, Absolute 10/07/2022 0.03  0.00 - 0.20 10*3/mm3 Final   • Immature Grans, Absolute 10/07/2022 0.01  0.00 - 0.05 10*3/mm3 Final   • nRBC 10/07/2022 0.0  0.0 - 0.2 /100 WBC Final        No results found.    ASSESSMENT: The patient is a very pleasant 43 y.o. female  with normocytic anemia, iron deficiency anemia, and vitamin B-12 deficiency      PLAN:    1.  Normocytic anemia  A.  I did go over the blood results with the patient from October 7, 2022.  She had anemia with hemoglobin of 10.8.  B.  I will repeat the patient's CBC today and follow-up on the results.    2.  Iron deficiency anemia.  A.  We will check blood work today and I will call her with results.  B.  If ferritin level is less than 30 or iron saturations less than 10 we will plan to repeat IV iron  C.  I explained the patient that she was iron deficient back in October 2022 with ferritin of 10 and saturation 19%    3.  Vitamin B12 deficiency  A.  At this point I recommend to continue monthly vitamin B12 replacement indefinitely.    4.  Polymenorrhagia:  A.  I will refer her back to gynecology.    FOLLOW UP: 6 months with repeat labs    Kennedy Yanes MD  3/15/2023

## 2023-03-16 LAB
25(OH)D3 SERPL-MCNC: 6.4 NG/ML (ref 30–100)
FOLATE SERPL-MCNC: 3.43 NG/ML (ref 4.78–24.2)
VIT B12 BLD-MCNC: 339 PG/ML (ref 211–946)

## 2023-03-23 ENCOUNTER — INFUSION (OUTPATIENT)
Dept: ONCOLOGY | Facility: HOSPITAL | Age: 43
End: 2023-03-23
Payer: COMMERCIAL

## 2023-03-23 VITALS — HEART RATE: 93 BPM | TEMPERATURE: 98.4 F | SYSTOLIC BLOOD PRESSURE: 138 MMHG | DIASTOLIC BLOOD PRESSURE: 92 MMHG

## 2023-03-23 DIAGNOSIS — K90.9 IRON MALABSORPTION: Primary | ICD-10-CM

## 2023-03-23 DIAGNOSIS — D50.0 IRON DEFICIENCY ANEMIA DUE TO CHRONIC BLOOD LOSS: ICD-10-CM

## 2023-03-23 PROCEDURE — 63710000001 DIPHENHYDRAMINE PER 50 MG: Performed by: INTERNAL MEDICINE

## 2023-03-23 PROCEDURE — 96374 THER/PROPH/DIAG INJ IV PUSH: CPT

## 2023-03-23 PROCEDURE — 25010000002 FERRIC CARBOXYMALTOSE 750 MG/15ML SOLUTION 15 ML VIAL: Performed by: INTERNAL MEDICINE

## 2023-03-23 PROCEDURE — 96375 TX/PRO/DX INJ NEW DRUG ADDON: CPT

## 2023-03-23 RX ORDER — FAMOTIDINE 10 MG/ML
20 INJECTION, SOLUTION INTRAVENOUS ONCE
Status: COMPLETED | OUTPATIENT
Start: 2023-03-23 | End: 2023-03-23

## 2023-03-23 RX ORDER — DIPHENHYDRAMINE HCL 25 MG
25 CAPSULE ORAL ONCE
Status: COMPLETED | OUTPATIENT
Start: 2023-03-23 | End: 2023-03-23

## 2023-03-23 RX ORDER — SODIUM CHLORIDE 9 MG/ML
250 INJECTION, SOLUTION INTRAVENOUS ONCE
Status: DISCONTINUED | OUTPATIENT
Start: 2023-03-23 | End: 2023-03-23 | Stop reason: HOSPADM

## 2023-03-23 RX ADMIN — DIPHENHYDRAMINE HYDROCHLORIDE 25 MG: 25 CAPSULE ORAL at 11:16

## 2023-03-23 RX ADMIN — FAMOTIDINE 20 MG: 10 INJECTION INTRAVENOUS at 11:17

## 2023-03-23 RX ADMIN — FERRIC CARBOXYMALTOSE INJECTION 750 MG: 50 INJECTION, SOLUTION INTRAVENOUS at 11:34

## 2023-03-30 ENCOUNTER — INFUSION (OUTPATIENT)
Dept: ONCOLOGY | Facility: HOSPITAL | Age: 43
End: 2023-03-30
Payer: COMMERCIAL

## 2023-03-30 VITALS
DIASTOLIC BLOOD PRESSURE: 83 MMHG | TEMPERATURE: 98.6 F | HEART RATE: 87 BPM | RESPIRATION RATE: 12 BRPM | SYSTOLIC BLOOD PRESSURE: 125 MMHG

## 2023-03-30 DIAGNOSIS — K90.9 IRON MALABSORPTION: Primary | ICD-10-CM

## 2023-03-30 DIAGNOSIS — D50.0 IRON DEFICIENCY ANEMIA DUE TO CHRONIC BLOOD LOSS: ICD-10-CM

## 2023-03-30 PROCEDURE — 96375 TX/PRO/DX INJ NEW DRUG ADDON: CPT

## 2023-03-30 PROCEDURE — 25010000002 FERRIC CARBOXYMALTOSE 750 MG/15ML SOLUTION 15 ML VIAL: Performed by: INTERNAL MEDICINE

## 2023-03-30 PROCEDURE — 96374 THER/PROPH/DIAG INJ IV PUSH: CPT

## 2023-03-30 PROCEDURE — 63710000001 DIPHENHYDRAMINE PER 50 MG: Performed by: INTERNAL MEDICINE

## 2023-03-30 PROCEDURE — 96376 TX/PRO/DX INJ SAME DRUG ADON: CPT

## 2023-03-30 PROCEDURE — 96365 THER/PROPH/DIAG IV INF INIT: CPT

## 2023-03-30 RX ORDER — DIPHENHYDRAMINE HCL 25 MG
25 CAPSULE ORAL ONCE
Status: COMPLETED | OUTPATIENT
Start: 2023-03-30 | End: 2023-03-30

## 2023-03-30 RX ORDER — FAMOTIDINE 10 MG/ML
20 INJECTION, SOLUTION INTRAVENOUS ONCE
Status: COMPLETED | OUTPATIENT
Start: 2023-03-30 | End: 2023-03-30

## 2023-03-30 RX ORDER — SODIUM CHLORIDE 9 MG/ML
250 INJECTION, SOLUTION INTRAVENOUS ONCE
Status: DISCONTINUED | OUTPATIENT
Start: 2023-03-30 | End: 2023-03-30 | Stop reason: HOSPADM

## 2023-03-30 RX ADMIN — FERRIC CARBOXYMALTOSE INJECTION 750 MG: 50 INJECTION, SOLUTION INTRAVENOUS at 14:45

## 2023-03-30 RX ADMIN — DIPHENHYDRAMINE HYDROCHLORIDE 25 MG: 25 CAPSULE ORAL at 14:32

## 2023-03-30 RX ADMIN — FAMOTIDINE 20 MG: 10 INJECTION INTRAVENOUS at 14:33

## 2023-05-10 ENCOUNTER — INFUSION (OUTPATIENT)
Dept: ONCOLOGY | Facility: HOSPITAL | Age: 43
End: 2023-05-10
Payer: COMMERCIAL

## 2023-05-10 VITALS
RESPIRATION RATE: 16 BRPM | BODY MASS INDEX: 26.03 KG/M2 | TEMPERATURE: 97.8 F | DIASTOLIC BLOOD PRESSURE: 85 MMHG | SYSTOLIC BLOOD PRESSURE: 123 MMHG | WEIGHT: 146.9 LBS | HEART RATE: 84 BPM | OXYGEN SATURATION: 99 %

## 2023-05-10 DIAGNOSIS — D50.0 IRON DEFICIENCY ANEMIA DUE TO CHRONIC BLOOD LOSS: ICD-10-CM

## 2023-05-10 DIAGNOSIS — E53.8 VITAMIN B 12 DEFICIENCY: Primary | ICD-10-CM

## 2023-05-10 PROCEDURE — 25010000002 CYANOCOBALAMIN PER 1000 MCG: Performed by: INTERNAL MEDICINE

## 2023-05-10 PROCEDURE — 96372 THER/PROPH/DIAG INJ SC/IM: CPT

## 2023-05-10 RX ORDER — CYANOCOBALAMIN 1000 UG/ML
1000 INJECTION, SOLUTION INTRAMUSCULAR; SUBCUTANEOUS ONCE
Status: COMPLETED | OUTPATIENT
Start: 2023-05-10 | End: 2023-05-10

## 2023-05-10 RX ADMIN — CYANOCOBALAMIN 1000 MCG: 1000 INJECTION, SOLUTION INTRAMUSCULAR at 15:09

## 2023-05-30 ENCOUNTER — LAB (OUTPATIENT)
Dept: LAB | Facility: HOSPITAL | Age: 43
End: 2023-05-30

## 2023-05-30 ENCOUNTER — PREP FOR SURGERY (OUTPATIENT)
Dept: OTHER | Facility: HOSPITAL | Age: 43
End: 2023-05-30

## 2023-05-30 ENCOUNTER — OFFICE VISIT (OUTPATIENT)
Dept: OBSTETRICS AND GYNECOLOGY | Facility: CLINIC | Age: 43
End: 2023-05-30

## 2023-05-30 VITALS
SYSTOLIC BLOOD PRESSURE: 130 MMHG | DIASTOLIC BLOOD PRESSURE: 80 MMHG | HEIGHT: 63 IN | WEIGHT: 147.6 LBS | BODY MASS INDEX: 26.15 KG/M2

## 2023-05-30 DIAGNOSIS — N92.0 MENORRHAGIA WITH REGULAR CYCLE: ICD-10-CM

## 2023-05-30 DIAGNOSIS — N94.6 DYSMENORRHEA: ICD-10-CM

## 2023-05-30 DIAGNOSIS — Z90.721 S/P HYSTERECTOMY WITH OOPHORECTOMY: Primary | ICD-10-CM

## 2023-05-30 DIAGNOSIS — Z90.710 S/P HYSTERECTOMY WITH OOPHORECTOMY: Primary | ICD-10-CM

## 2023-05-30 DIAGNOSIS — N95.1 MENOPAUSAL SYMPTOMS: ICD-10-CM

## 2023-05-30 DIAGNOSIS — N94.6 DYSMENORRHEA: Primary | ICD-10-CM

## 2023-05-30 LAB
BASOPHILS # BLD AUTO: 0.03 10*3/MM3 (ref 0–0.2)
BASOPHILS NFR BLD AUTO: 0.6 % (ref 0–1.5)
BILIRUB UR QL STRIP: NEGATIVE
CLARITY UR: CLEAR
COLOR UR: YELLOW
DEPRECATED RDW RBC AUTO: 41.6 FL (ref 37–54)
EOSINOPHIL # BLD AUTO: 0.13 10*3/MM3 (ref 0–0.4)
EOSINOPHIL NFR BLD AUTO: 2.5 % (ref 0.3–6.2)
ERYTHROCYTE [DISTWIDTH] IN BLOOD BY AUTOMATED COUNT: 12.7 % (ref 12.3–15.4)
ESTRADIOL SERPL HS-MCNC: 182 PG/ML
FSH SERPL-ACNC: 11.3 MIU/ML
GLUCOSE UR STRIP-MCNC: NEGATIVE MG/DL
HCT VFR BLD AUTO: 38.6 % (ref 34–46.6)
HGB BLD-MCNC: 12.9 G/DL (ref 12–15.9)
HGB UR QL STRIP.AUTO: NEGATIVE
IMM GRANULOCYTES # BLD AUTO: 0.01 10*3/MM3 (ref 0–0.05)
IMM GRANULOCYTES NFR BLD AUTO: 0.2 % (ref 0–0.5)
KETONES UR QL STRIP: NEGATIVE
LEUKOCYTE ESTERASE UR QL STRIP.AUTO: NEGATIVE
LYMPHOCYTES # BLD AUTO: 1.19 10*3/MM3 (ref 0.7–3.1)
LYMPHOCYTES NFR BLD AUTO: 23.3 % (ref 19.6–45.3)
MCH RBC QN AUTO: 29.9 PG (ref 26.6–33)
MCHC RBC AUTO-ENTMCNC: 33.4 G/DL (ref 31.5–35.7)
MCV RBC AUTO: 89.6 FL (ref 79–97)
MONOCYTES # BLD AUTO: 0.37 10*3/MM3 (ref 0.1–0.9)
MONOCYTES NFR BLD AUTO: 7.2 % (ref 5–12)
NEUTROPHILS NFR BLD AUTO: 3.38 10*3/MM3 (ref 1.7–7)
NEUTROPHILS NFR BLD AUTO: 66.2 % (ref 42.7–76)
NITRITE UR QL STRIP: NEGATIVE
NRBC BLD AUTO-RTO: 0 /100 WBC (ref 0–0.2)
PH UR STRIP.AUTO: 6.5 [PH] (ref 5–8)
PLATELET # BLD AUTO: 156 10*3/MM3 (ref 140–450)
PMV BLD AUTO: 10.5 FL (ref 6–12)
PROT UR QL STRIP: NEGATIVE
RBC # BLD AUTO: 4.31 10*6/MM3 (ref 3.77–5.28)
SP GR UR STRIP: 1.01 (ref 1–1.03)
UROBILINOGEN UR QL STRIP: NORMAL
WBC NRBC COR # BLD: 5.11 10*3/MM3 (ref 3.4–10.8)

## 2023-05-30 PROCEDURE — 1160F RVW MEDS BY RX/DR IN RCRD: CPT | Performed by: PHYSICIAN ASSISTANT

## 2023-05-30 PROCEDURE — 85025 COMPLETE CBC W/AUTO DIFF WBC: CPT

## 2023-05-30 PROCEDURE — 83001 ASSAY OF GONADOTROPIN (FSH): CPT | Performed by: PHYSICIAN ASSISTANT

## 2023-05-30 PROCEDURE — 99214 OFFICE O/P EST MOD 30 MIN: CPT | Performed by: PHYSICIAN ASSISTANT

## 2023-05-30 PROCEDURE — 82670 ASSAY OF TOTAL ESTRADIOL: CPT | Performed by: PHYSICIAN ASSISTANT

## 2023-05-30 PROCEDURE — 1159F MED LIST DOCD IN RCRD: CPT | Performed by: PHYSICIAN ASSISTANT

## 2023-05-30 PROCEDURE — 81003 URINALYSIS AUTO W/O SCOPE: CPT

## 2023-05-30 PROCEDURE — 36415 COLL VENOUS BLD VENIPUNCTURE: CPT | Performed by: PHYSICIAN ASSISTANT

## 2023-05-30 RX ORDER — SODIUM CHLORIDE 9 MG/ML
40 INJECTION, SOLUTION INTRAVENOUS AS NEEDED
OUTPATIENT
Start: 2023-05-30

## 2023-05-30 RX ORDER — SODIUM CHLORIDE 0.9 % (FLUSH) 0.9 %
10 SYRINGE (ML) INJECTION AS NEEDED
OUTPATIENT
Start: 2023-05-30

## 2023-05-30 RX ORDER — CEFAZOLIN SODIUM 2 G/50ML
2 SOLUTION INTRAVENOUS ONCE
OUTPATIENT
Start: 2023-05-30 | End: 2023-05-30

## 2023-05-30 RX ORDER — SODIUM CHLORIDE 0.9 % (FLUSH) 0.9 %
10 SYRINGE (ML) INJECTION EVERY 12 HOURS SCHEDULED
OUTPATIENT
Start: 2023-05-30

## 2023-05-30 RX ORDER — PHENAZOPYRIDINE HYDROCHLORIDE 100 MG/1
200 TABLET, FILM COATED ORAL ONCE
OUTPATIENT
Start: 2023-05-30 | End: 2023-05-30

## 2023-05-30 NOTE — PATIENT INSTRUCTIONS
Patient would like to recheck menopause labs   Will RTO for updated pelvic ultrasound but wanting to schedule hysterectomy and bilateral salpingo-oophorectomy  Counseled regarding potential complications of surgery including bleeding, infection, damage to abdominal/pelvic organs

## 2023-05-30 NOTE — PROGRESS NOTES
"Subjective   Chief Complaint   Patient presents with   • Follow-up     C/O pain with periods, would like to discuss menopause       Amandaamita Beasley is a 43 y.o. year old  presenting to be seen for complaint of painful menses  Her daughter is present today who translates for visit  Patient is continuing to have very painful menses and is bedridden with dysmenorrhea.  Menses heavy also and has been anemic. CBC in March noted Hgb 11.8  Pelvic pain and cramping onset about 2 weeks before menses.  Has occasionally skipped one month of cycling but no longer than 1 month  She is having hot flashes and night sweats.   We had discussed a hysterectomy in  as patient did not want to try more conservative hormonal options for her issues  Requesting to proceed with hysterectomy now as well as removal of ovaries  Last ultrasound in  noted small fibroids and small simple ovarian cysts  LMP 2023    Normal pap 2021    Past Medical History:   Diagnosis Date   • Anemia    • Body piercing     both ears   • COVID-19 vaccine series completed     phizer   • GERD (gastroesophageal reflux disease)    • History of blood transfusion     no reaction   • Iron deficiency    • Wears glasses         Current Outpatient Medications:   •  acetaminophen (TYLENOL) 650 MG 8 hr tablet, , Disp: , Rfl:   •  B-D 3CC LUER-NANETTE SYR 25GX1\" 25G X 1\" 3 ML misc, , Disp: , Rfl:   •  cyanocobalamin 1000 MCG/ML injection, , Disp: , Rfl:   •  metoclopramide (REGLAN) 5 MG tablet, , Disp: , Rfl:   •  omeprazole (priLOSEC) 20 MG capsule, Take 1 capsule by mouth Daily., Disp: 30 capsule, Rfl: 5   No Known Allergies   Past Surgical History:   Procedure Laterality Date   • COLONOSCOPY N/A 3/5/2021    Procedure: COLONOSCOPY WITH BIOPSY;  Surgeon: Jay Sharma MD;  Location: [facility] ENDOSCOPY;  Service: Gastroenterology;  Laterality: N/A;   • D & C HYSTEROSCOPY N/A 2021    Procedure: DILATATION AND CURETTAGE HYSTEROSCOPY;  Surgeon: " "Andrew Hackett MD;  Location: [facility] OR;  Service: Obstetrics/Gynecology;  Laterality: N/A;   • DILATATION AND CURETTAGE     • ENDOSCOPY N/A 12/11/2020    Procedure: ESOPHAGOGASTRODUODENOSCOPY WITH BIOPSY;  Surgeon: Jay Sharma MD;  Location: [facility] ENDOSCOPY;  Service: Gastroenterology;  Laterality: N/A;   • HYSTEROSCOPY        Social History     Socioeconomic History   • Marital status:    Tobacco Use   • Smoking status: Never   • Smokeless tobacco: Never   Vaping Use   • Vaping Use: Never used   Substance and Sexual Activity   • Alcohol use: Never   • Drug use: Never   • Sexual activity: Not Currently     Birth control/protection: None      Family History   Problem Relation Age of Onset   • No Known Problems Father    • No Known Problems Mother    • Colon cancer Neg Hx    • Cirrhosis Neg Hx    • Liver cancer Neg Hx    • Liver disease Neg Hx        Review of Systems   Constitutional: Negative for chills, diaphoresis and fever.   Gastrointestinal: Negative for constipation, diarrhea, nausea and vomiting.   Genitourinary: Positive for menstrual problem and pelvic pain. Negative for difficulty urinating and dysuria.           Objective   /80   Ht 160 cm (63\")   Wt 67 kg (147 lb 9.6 oz)   LMP 05/13/2023 (Exact Date)   BMI 26.15 kg/m²     Physical Exam  Constitutional:       Appearance: Normal appearance. She is well-developed and well-groomed.   Eyes:      General: Lids are normal.      Extraocular Movements: Extraocular movements intact.      Conjunctiva/sclera: Conjunctivae normal.   Neurological:      General: No focal deficit present.      Mental Status: She is alert and oriented to person, place, and time.   Psychiatric:         Attention and Perception: Attention normal.         Mood and Affect: Mood normal.         Speech: Speech normal.         Behavior: Behavior is cooperative.            Result Review :           Follicle Stimulating Hormone (07/19/2021 " 11:49)  Estradiol (07/19/2021 11:49)  US Non-ob Transvaginal (08/10/2021 13:46)          Assessment and Plan  Diagnoses and all orders for this visit:    1. Dysmenorrhea (Primary)    2. Menorrhagia with regular cycle  -     Estradiol  -     Follicle Stimulating Hormone    3. Menopausal symptoms  -     Estradiol  -     Follicle Stimulating Hormone      Patient Instructions   Patient would like to recheck menopause labs   Will RTO for updated pelvic ultrasound but wanting to schedule hysterectomy and bilateral salpingo-oophorectomy  Counseled regarding potential complications of surgery including bleeding, infection, damage to abdominal/pelvic organs             This note was electronically signed.    Carmen Jasmine PA-C   May 30, 2023

## 2023-07-20 PROBLEM — N94.6 DYSMENORRHEA: Status: ACTIVE | Noted: 2023-07-20

## 2023-07-26 ENCOUNTER — TELEPHONE (OUTPATIENT)
Dept: PREADMISSION TESTING | Facility: HOSPITAL | Age: 43
End: 2023-07-26

## 2023-07-27 ENCOUNTER — PRE-ADMISSION TESTING (OUTPATIENT)
Dept: PREADMISSION TESTING | Facility: HOSPITAL | Age: 43
End: 2023-07-27
Payer: COMMERCIAL

## 2023-07-27 VITALS — WEIGHT: 148.4 LBS | HEIGHT: 64 IN | BODY MASS INDEX: 25.33 KG/M2

## 2023-07-27 LAB
BASOPHILS # BLD AUTO: 0.02 10*3/MM3 (ref 0–0.2)
BASOPHILS NFR BLD AUTO: 0.4 % (ref 0–1.5)
DEPRECATED RDW RBC AUTO: 38.2 FL (ref 37–54)
EOSINOPHIL # BLD AUTO: 0.06 10*3/MM3 (ref 0–0.4)
EOSINOPHIL NFR BLD AUTO: 1.3 % (ref 0.3–6.2)
ERYTHROCYTE [DISTWIDTH] IN BLOOD BY AUTOMATED COUNT: 11.8 % (ref 12.3–15.4)
HCT VFR BLD AUTO: 35.2 % (ref 34–46.6)
HGB BLD-MCNC: 12 G/DL (ref 12–15.9)
IMM GRANULOCYTES # BLD AUTO: 0.02 10*3/MM3 (ref 0–0.05)
IMM GRANULOCYTES NFR BLD AUTO: 0.4 % (ref 0–0.5)
LYMPHOCYTES # BLD AUTO: 0.97 10*3/MM3 (ref 0.7–3.1)
LYMPHOCYTES NFR BLD AUTO: 21 % (ref 19.6–45.3)
MCH RBC QN AUTO: 30.5 PG (ref 26.6–33)
MCHC RBC AUTO-ENTMCNC: 34.1 G/DL (ref 31.5–35.7)
MCV RBC AUTO: 89.3 FL (ref 79–97)
MONOCYTES # BLD AUTO: 0.3 10*3/MM3 (ref 0.1–0.9)
MONOCYTES NFR BLD AUTO: 6.5 % (ref 5–12)
NEUTROPHILS NFR BLD AUTO: 3.25 10*3/MM3 (ref 1.7–7)
NEUTROPHILS NFR BLD AUTO: 70.4 % (ref 42.7–76)
NRBC BLD AUTO-RTO: 0 /100 WBC (ref 0–0.2)
PLATELET # BLD AUTO: 192 10*3/MM3 (ref 140–450)
PMV BLD AUTO: 10 FL (ref 6–12)
RBC # BLD AUTO: 3.94 10*6/MM3 (ref 3.77–5.28)
WBC NRBC COR # BLD: 4.62 10*3/MM3 (ref 3.4–10.8)

## 2023-07-27 PROCEDURE — 36415 COLL VENOUS BLD VENIPUNCTURE: CPT

## 2023-07-27 PROCEDURE — 85025 COMPLETE CBC W/AUTO DIFF WBC: CPT

## 2023-07-27 NOTE — DISCHARGE INSTRUCTIONS
PAT PASS GIVEN/REVIEWED WITH PT.  VERBALIZED UNDERSTANDING OF THE FOLLOWING:  DO NOT EAT, DRINK, SMOKE, USE SMOKELESS TOBACCO OR CHEW GUM AFTER MIDNIGHT THE NIGHT BEFORE SURGERY.  THIS ALSO INCLUDES HARD CANDIES AND MINTS.    DO NOT SHAVE THE AREA TO BE OPERATED ON AT LEAST 48 HOURS PRIOR TO THE PROCEDURE.  DO NOT WEAR MAKE UP OR NAIL POLISH.  DO NOT LEAVE IN ANY PIERCING OR WEAR JEWELRY THE DAY OF SURGERY.      DO NOT USE ADHESIVES IF YOU WEAR DENTURES.    DO NOT WEAR EYE CONTACTS; BRING IN YOUR GLASSES.    ONLY TAKE MEDICATION THE MORNING OF YOUR PROCEDURE IF INSTRUCTED BY YOUR SURGEON WITH ENOUGH WATER TO SWALLOW THE MEDICATION.  IF YOUR SURGEON DID NOT SPECIFY WHICH MEDICATIONS TO TAKE, YOU WILL NEED TO CALL THEIR OFFICE FOR FURTHER INSTRUCTIONS AND DO AS THEY INSTRUCT.    LEAVE ANYTHING YOU CONSIDER VALUABLE AT HOME.    YOU WILL NEED TO ARRANGE FOR SOMEONE TO DRIVE YOU HOME AFTER SURGERY.  IT IS RECOMMENDED THAT YOU DO NOT DRIVE, WORK, DRINK ALCOHOL OR MAKE MAJOR DECISIONS FOR AT LEAST 24 HOURS AFTER YOUR PROCEDURE IS COMPLETE.      THE DAY OF YOUR PROCEDURE, BRING IN THE FOLLOWING IF APPLICABLE:   PICTURE ID AND INSURANCE/MEDICARE OR MEDICAID CARDS/ANY CO-PAY THAT MAY BE DUE   COPY OF ADVANCED DIRECTIVE/LIVING WILL/POWER OR    CPAP/BIPAP/INHALERS   SKIN PREP SHEET   YOUR PREADMISSION TESTING PASS (IF NOT A PHONE HISTORY)      Chlorhexadine wipes along with instruction/verification sheet given to pt.  Instructed pt to date, time, and initial the verification sheet once skin prep has been  completed, and to return to [facility] the day of the procedure.  Pt. Verbalizes understanding. Introduction to anesthesia video viewed by pt in PAT.  GYNECOLOGICAL PREP:    1). Soap Suds Enema (as instructed) the night before surgery.    2). Purchase two medicated douches. Use one douche the night before the procedure     and the other on the morning of your surgery.    Schedule:    On______, the evening before  the procedure, do the following:    Soap Suds Enema  1 Medicated Douche  Shower  Use 1 package of chlorhexidine wipes, 1 hour after the shower.      On______, the morning of the procedure, do the followin Medicated Douche  Use 1 package of chlorhexidine wipes.

## 2023-08-09 ENCOUNTER — LAB (OUTPATIENT)
Dept: LAB | Facility: HOSPITAL | Age: 43
End: 2023-08-09
Payer: COMMERCIAL

## 2023-08-09 ENCOUNTER — OFFICE VISIT (OUTPATIENT)
Dept: ONCOLOGY | Facility: CLINIC | Age: 43
End: 2023-08-09
Payer: COMMERCIAL

## 2023-08-09 VITALS
TEMPERATURE: 97.7 F | BODY MASS INDEX: 25.1 KG/M2 | HEIGHT: 64 IN | RESPIRATION RATE: 12 BRPM | DIASTOLIC BLOOD PRESSURE: 106 MMHG | HEART RATE: 85 BPM | WEIGHT: 147 LBS | SYSTOLIC BLOOD PRESSURE: 152 MMHG | OXYGEN SATURATION: 99 %

## 2023-08-09 DIAGNOSIS — D50.0 IRON DEFICIENCY ANEMIA DUE TO CHRONIC BLOOD LOSS: ICD-10-CM

## 2023-08-09 DIAGNOSIS — E53.8 VITAMIN B 12 DEFICIENCY: ICD-10-CM

## 2023-08-09 DIAGNOSIS — D50.0 IRON DEFICIENCY ANEMIA DUE TO CHRONIC BLOOD LOSS: Primary | ICD-10-CM

## 2023-08-09 LAB
BASOPHILS # BLD AUTO: 0.02 10*3/MM3 (ref 0–0.2)
BASOPHILS NFR BLD AUTO: 0.5 % (ref 0–1.5)
DEPRECATED RDW RBC AUTO: 39.3 FL (ref 37–54)
EOSINOPHIL # BLD AUTO: 0.07 10*3/MM3 (ref 0–0.4)
EOSINOPHIL NFR BLD AUTO: 1.6 % (ref 0.3–6.2)
ERYTHROCYTE [DISTWIDTH] IN BLOOD BY AUTOMATED COUNT: 11.8 % (ref 12.3–15.4)
FERRITIN SERPL-MCNC: 253.5 NG/ML (ref 13–150)
HCT VFR BLD AUTO: 36 % (ref 34–46.6)
HGB BLD-MCNC: 12.1 G/DL (ref 12–15.9)
IMM GRANULOCYTES # BLD AUTO: 0 10*3/MM3 (ref 0–0.05)
IMM GRANULOCYTES NFR BLD AUTO: 0 % (ref 0–0.5)
IRON 24H UR-MRATE: 104 MCG/DL (ref 37–145)
IRON SATN MFR SERPL: 31 % (ref 20–50)
LYMPHOCYTES # BLD AUTO: 0.87 10*3/MM3 (ref 0.7–3.1)
LYMPHOCYTES NFR BLD AUTO: 20.2 % (ref 19.6–45.3)
MCH RBC QN AUTO: 30.7 PG (ref 26.6–33)
MCHC RBC AUTO-ENTMCNC: 33.6 G/DL (ref 31.5–35.7)
MCV RBC AUTO: 91.4 FL (ref 79–97)
MONOCYTES # BLD AUTO: 0.31 10*3/MM3 (ref 0.1–0.9)
MONOCYTES NFR BLD AUTO: 7.2 % (ref 5–12)
NEUTROPHILS NFR BLD AUTO: 3.03 10*3/MM3 (ref 1.7–7)
NEUTROPHILS NFR BLD AUTO: 70.5 % (ref 42.7–76)
NRBC BLD AUTO-RTO: 0 /100 WBC (ref 0–0.2)
PLATELET # BLD AUTO: 175 10*3/MM3 (ref 140–450)
PMV BLD AUTO: 10.3 FL (ref 6–12)
RBC # BLD AUTO: 3.94 10*6/MM3 (ref 3.77–5.28)
TIBC SERPL-MCNC: 335 MCG/DL (ref 298–536)
TRANSFERRIN SERPL-MCNC: 225 MG/DL (ref 200–360)
WBC NRBC COR # BLD: 4.3 10*3/MM3 (ref 3.4–10.8)

## 2023-08-09 PROCEDURE — 82728 ASSAY OF FERRITIN: CPT | Performed by: NURSE PRACTITIONER

## 2023-08-09 PROCEDURE — 99213 OFFICE O/P EST LOW 20 MIN: CPT | Performed by: NURSE PRACTITIONER

## 2023-08-09 PROCEDURE — 85025 COMPLETE CBC W/AUTO DIFF WBC: CPT

## 2023-08-09 PROCEDURE — 1159F MED LIST DOCD IN RCRD: CPT | Performed by: NURSE PRACTITIONER

## 2023-08-09 PROCEDURE — 36415 COLL VENOUS BLD VENIPUNCTURE: CPT

## 2023-08-09 PROCEDURE — 84466 ASSAY OF TRANSFERRIN: CPT

## 2023-08-09 PROCEDURE — 1160F RVW MEDS BY RX/DR IN RCRD: CPT | Performed by: NURSE PRACTITIONER

## 2023-08-09 PROCEDURE — 1126F AMNT PAIN NOTED NONE PRSNT: CPT | Performed by: NURSE PRACTITIONER

## 2023-08-09 PROCEDURE — 83540 ASSAY OF IRON: CPT

## 2023-08-09 NOTE — PROGRESS NOTES
DATE OF VISIT: 8/9/2023    REASON FOR VISIT: Followup for normocytic anemia     PROBLEM LIST:  1.  Normocytic anemia  2.  Iron deficiency:  A.  Negative GI evaluation EGD December 11, 2020 and colonoscopy March 5, 2020  3.  Polymenorrhagia  A.  Improved after endometrial ablation done August 25, 2021  B.  Pathology report revealed no evidence of malignancy  C.  Worsening on 5/26/2022.  Considering partial hysterectomy versus total hysterectomy  4.  Heartburn    HISTORY OF PRESENT ILLNESS: The patient is a very pleasant 43 y.o. female  with past medical history significant for normocytic anemia diagnosed May 2020.  Iron deficiency anemia noted 5/18/2021.  She had IV iron at that time.  Improved anemia.  The patient is here today for follow-up.     SUBJECTIVE: Ms. Beasley is here today with her daughter.  She is complaining of fatigue but has been having irregular menstrual cycles.  She is scheduled for a total hysterectomy tomorrow.  She is unsure if she would like to proceed with her surgery.  She is going to talk with the GYN today    Past History:  Medical History: has a past medical history of Anemia, Body piercing, COVID-19 vaccine series completed, GERD (gastroesophageal reflux disease), History of blood transfusion, Iron deficiency, and Wears glasses.   Surgical History: has a past surgical history that includes Esophagogastroduodenoscopy (N/A, 12/11/2020); Colonoscopy (N/A, 3/5/2021); d & c hysteroscopy (N/A, 8/25/2021); Dilation and curettage of uterus; and Hysteroscopy.   Family History: family history includes No Known Problems in her father and mother.   Social History: reports that she has never smoked. She has never used smokeless tobacco. She reports that she does not drink alcohol and does not use drugs.    (Not in a hospital admission)     Allergies: Patient has no known allergies.     Review of Systems   Constitutional:  Positive for fatigue.   Endocrine: Positive for heat intolerance.  "  Genitourinary:  Positive for menstrual problem.   Musculoskeletal:  Positive for arthralgias.   All other systems reviewed and are negative.    PHYSICAL EXAMINATION:   BP (!) 152/106   Pulse 85   Temp 97.7 øF (36.5 øC) (Temporal)   Resp 12   Ht 162.6 cm (64\")   Wt 66.7 kg (147 lb)   LMP 07/16/2023   SpO2 99%   BMI 25.23 kg/mý    Pain Score    08/09/23 1410   PainSc: 0-No pain                     ECOG Performance Status: 1 - Symptomatic but completely ambulatory      General Appearance:      alert, cooperative, no apparent distress and appears stated age   Lungs:   Clear to auscultation bilaterally; respirations regular, even, and unlabored bilaterally   Heart:  Regular rate and rhythm, no murmurs appreciated   Abdomen:                    Pre-Admission Testing on 07/27/2023   Component Date Value Ref Range Status    WBC 07/27/2023 4.62  3.40 - 10.80 10*3/mm3 Final    RBC 07/27/2023 3.94  3.77 - 5.28 10*6/mm3 Final    Hemoglobin 07/27/2023 12.0  12.0 - 15.9 g/dL Final    Hematocrit 07/27/2023 35.2  34.0 - 46.6 % Final    MCV 07/27/2023 89.3  79.0 - 97.0 fL Final    MCH 07/27/2023 30.5  26.6 - 33.0 pg Final    MCHC 07/27/2023 34.1  31.5 - 35.7 g/dL Final    RDW 07/27/2023 11.8 (L)  12.3 - 15.4 % Final    RDW-SD 07/27/2023 38.2  37.0 - 54.0 fl Final    MPV 07/27/2023 10.0  6.0 - 12.0 fL Final    Platelets 07/27/2023 192  140 - 450 10*3/mm3 Final    Neutrophil % 07/27/2023 70.4  42.7 - 76.0 % Final    Lymphocyte % 07/27/2023 21.0  19.6 - 45.3 % Final    Monocyte % 07/27/2023 6.5  5.0 - 12.0 % Final    Eosinophil % 07/27/2023 1.3  0.3 - 6.2 % Final    Basophil % 07/27/2023 0.4  0.0 - 1.5 % Final    Immature Grans % 07/27/2023 0.4  0.0 - 0.5 % Final    Neutrophils, Absolute 07/27/2023 3.25  1.70 - 7.00 10*3/mm3 Final    Lymphocytes, Absolute 07/27/2023 0.97  0.70 - 3.10 10*3/mm3 Final    Monocytes, Absolute 07/27/2023 0.30  0.10 - 0.90 10*3/mm3 Final    Eosinophils, Absolute 07/27/2023 0.06  0.00 - 0.40 10*3/mm3 " Final    Basophils, Absolute 07/27/2023 0.02  0.00 - 0.20 10*3/mm3 Final    Immature Grans, Absolute 07/27/2023 0.02  0.00 - 0.05 10*3/mm3 Final    nRBC 07/27/2023 0.0  0.0 - 0.2 /100 WBC Final        US Non-ob Transvaginal    Result Date: 7/19/2023  Narrative: Uterus is retroflexed-size and shape normal.  Intramural fibroids 1.7 1.3 cm.  Endometrium 10.4 mm.  Bilateral simple symmetric cysts approximately 1 to 1.5 cm may represent paratubal cyst.  There is no free fluid or solid masses      ASSESSMENT: The patient is a very pleasant 43 y.o. female  with normocytic anemia, iron deficiency anemia, and vitamin B-12 deficiency      PLAN:    1.  Normocytic anemia  A.  I did go over the blood results with the patient from July 27, 2023.  Anemia had resolved with a hemoglobin of 12.0   B.  We will plan to repeat patient's CBC upon return.    2.  Iron deficiency anemia.  A.  No recent iron studies.  We will check blood work today and I will call her with results.  B.  If ferritin level is less than 30 or iron saturations less than 10 we will plan to repeat IV iron  C.  I explained the patient that she was iron deficient back in October 2022 with ferritin of 10 and saturation 19%  B.  I discussed with the patient and her daughter that we will continue iron studies for now whether she moves forward with total hysterectomy versus holding off on any surgery for now.    3.  Vitamin B12 deficiency  A.  At this point I recommend to continue monthly vitamin B12 replacement indefinitely.    4.  Polymenorrhagia:  A.  This is somewhat improving.  She has months where she does not have a.  And then some months she will have 2 in 1 month.  She is scheduled for a total hysterectomy on 8/10/2023    FOLLOW UP: 6 months with repeat labs    RANDY Kidd  8/9/2023

## 2023-08-10 ENCOUNTER — TELEPHONE (OUTPATIENT)
Dept: OBSTETRICS AND GYNECOLOGY | Facility: CLINIC | Age: 43
End: 2023-08-10
Payer: COMMERCIAL

## 2023-08-10 NOTE — TELEPHONE ENCOUNTER
Noted    ----- Message from Heena Wilcox sent at 8/9/2023  3:48 PM EDT -----  Regarding: CANCELLED HYSTERECTOMY  Laverne Ramirez called and cancelled her Mother's surgery she states her hematologist says she should just let everything happen natural.    ThanksHeena

## 2024-01-18 DIAGNOSIS — D50.0 IRON DEFICIENCY ANEMIA DUE TO CHRONIC BLOOD LOSS: Primary | ICD-10-CM

## 2024-01-18 DIAGNOSIS — E53.8 VITAMIN B 12 DEFICIENCY: ICD-10-CM

## 2024-06-12 ENCOUNTER — TRANSCRIBE ORDERS (OUTPATIENT)
Dept: ADMINISTRATIVE | Facility: HOSPITAL | Age: 44
End: 2024-06-12
Payer: COMMERCIAL

## 2024-06-12 DIAGNOSIS — Z12.31 VISIT FOR SCREENING MAMMOGRAM: Primary | ICD-10-CM

## 2024-12-18 ENCOUNTER — LAB (OUTPATIENT)
Dept: LAB | Facility: HOSPITAL | Age: 44
End: 2024-12-18
Payer: COMMERCIAL

## 2024-12-18 ENCOUNTER — OFFICE VISIT (OUTPATIENT)
Dept: OBSTETRICS AND GYNECOLOGY | Facility: CLINIC | Age: 44
End: 2024-12-18
Payer: COMMERCIAL

## 2024-12-18 VITALS
DIASTOLIC BLOOD PRESSURE: 80 MMHG | HEIGHT: 64 IN | BODY MASS INDEX: 24.92 KG/M2 | WEIGHT: 146 LBS | SYSTOLIC BLOOD PRESSURE: 124 MMHG

## 2024-12-18 DIAGNOSIS — N93.9 ABNORMAL UTERINE BLEEDING (AUB): Primary | ICD-10-CM

## 2024-12-18 PROCEDURE — 36415 COLL VENOUS BLD VENIPUNCTURE: CPT | Performed by: OBSTETRICS & GYNECOLOGY

## 2024-12-18 RX ORDER — IBUPROFEN 600 MG/1
600 TABLET, FILM COATED ORAL EVERY 6 HOURS PRN
Qty: 30 TABLET | Refills: 1 | Status: SHIPPED | OUTPATIENT
Start: 2024-12-18

## 2024-12-18 RX ORDER — MEDROXYPROGESTERONE ACETATE 10 MG
10 TABLET ORAL DAILY
Qty: 30 TABLET | Refills: 3 | Status: SHIPPED | OUTPATIENT
Start: 2024-12-18

## 2024-12-18 NOTE — PROGRESS NOTES
Subjective   Chief Complaint   Patient presents with    Follow-up     Menopausal symptoms     Lana Beasley is a 44 y.o. year old .  Patient's last menstrual period was 2024 (exact date).  She presents to be seen because of menorhagia.  Patient has a history of irregular cycling.  I had a D&C back in  for similar reasons.  At times periods normalized on a regular not too bad at other times she skip spaces out and then has Tatiana metrorrhagia.  This coupled with the fact that patient is a vegan has led to iron deficiency in the past.  She has had iron infusions in the past.  She also complains of lower back pain and thigh pain associated with the cramping and passage of clots.  Initial cycle was in November having skipped October and this was spotty in nature.  It went away for a week or 2 and then she has had intermittent though persistent passage of clots.    OTHER COMPLAINTS:  Nothing else    The following portions of the patient's history were reviewed and updated as appropriate:She  has a past medical history of Anemia, Body piercing, COVID-19 vaccine series completed, GERD (gastroesophageal reflux disease), History of blood transfusion, Iron deficiency, and Wears glasses.  She does not have any pertinent problems on file.  She  has a past surgical history that includes Esophagogastroduodenoscopy (N/A, 2020); Colonoscopy (N/A, 3/5/2021); d & c hysteroscopy (N/A, 2021); Dilation and curettage of uterus; and Hysteroscopy.  Her family history includes No Known Problems in her father and mother.  She  reports that she has never smoked. She has never used smokeless tobacco. She reports that she does not drink alcohol and does not use drugs.  Current Outpatient Medications   Medication Sig Dispense Refill    omeprazole (priLOSEC) 20 MG capsule Take 1 capsule by mouth Daily. 30 capsule 5     No current facility-administered medications for this visit.     Current Outpatient Medications on  "File Prior to Visit   Medication Sig    omeprazole (priLOSEC) 20 MG capsule Take 1 capsule by mouth Daily.     No current facility-administered medications on file prior to visit.     She has No Known Allergies.    Social History    Tobacco Use      Smoking status: Never      Smokeless tobacco: Never    Review of Systems  Consitutional POS: nothing reported    NEG: anorexia or night sweats   Gastointestinal POS: nothing reported    NEG: bloating, change in bowel habits, melena, or reflux symptoms   Genitourinary POS: nothing reported    NEG: dysuria or hematuria   Integument POS: nothing reported    NEG: moles that are changing in size, shape, color or rashes   Breast POS: nothing reported    NEG: persistent breast lump, skin dimpling, or nipple discharge         Respiratory: negative  Cardiovascular: negative  GYN:   See HPI          Objective   /80   Ht 162.6 cm (64\")   Wt 66.2 kg (146 lb)   LMP 12/05/2024 (Exact Date)   BMI 25.06 kg/m²     General:  well developed; well nourished  no acute distress  mentation appropriate   Skin:  No suspicious lesions seen   Thyroid: not examined   Lungs:  breathing is unlabored  clear to auscultation bilaterally   Heart:  regular rate and rhythm, S1, S2 normal, no murmur, click, rub or gallop   Breasts:  Not performed.   Abdomen: soft, non-tender; no masses  no umbilical or inguinal hernias are present  no hepato-splenomegaly   Pelvis: Not performed.     Psychiatric: Alert and oriented ×3, mood and affect appropriate  HEENT: Atraumatic, normocephalic, normal scleral icterus  Extremities: 2+ pulses bilaterally, no edema      Lab Review   CBC    Imaging   Pelvic ultrasound report        Assessment   Menometrorrhagia associated with perimenopausal status  History of iron deficiency     Plan   Full hormone and laboratory panel today.  Provera 10 mg a day until further notice.  Follow-up TVS in 2 to 3 weeks.  Will call patient with blood work before ultrasounds.  Motrin 600 " mg 3 times daily as needed.    No orders of the defined types were placed in this encounter.         This note was electronically signed.      December 18, 2024

## 2025-01-15 ENCOUNTER — OFFICE VISIT (OUTPATIENT)
Dept: OBSTETRICS AND GYNECOLOGY | Facility: CLINIC | Age: 45
End: 2025-01-15
Payer: COMMERCIAL

## 2025-01-15 VITALS
DIASTOLIC BLOOD PRESSURE: 78 MMHG | SYSTOLIC BLOOD PRESSURE: 120 MMHG | BODY MASS INDEX: 24.7 KG/M2 | HEIGHT: 64 IN | WEIGHT: 144.7 LBS

## 2025-01-15 DIAGNOSIS — N93.9 ABNORMAL UTERINE BLEEDING (AUB): Primary | ICD-10-CM

## 2025-01-15 DIAGNOSIS — D25.1 FIBROIDS, INTRAMURAL: ICD-10-CM

## 2025-01-15 DIAGNOSIS — R93.89 THICKENED ENDOMETRIUM: ICD-10-CM

## 2025-01-15 NOTE — PROGRESS NOTES
Subjective   Chief Complaint   Patient presents with    Follow-up     3 Week follow up TVS done today     Lana Beasley is a 44 y.o. year old  ( x 3).  Patient's last menstrual period was 2024 (exact date).  She presents to be seen because of menorrhagia, fibroids, .  With the progesterone bleeding has tapered into only spotting.  Patient had blood work done through primary care and we do not have access as they are closed today.    OTHER COMPLAINTS:  Nothing else    The following portions of the patient's history were reviewed and updated as appropriate:She  has a past medical history of Anemia, Body piercing, COVID-19 vaccine series completed, GERD (gastroesophageal reflux disease), History of blood transfusion, Iron deficiency, and Wears glasses.  She does not have any pertinent problems on file.  She  has a past surgical history that includes Esophagogastroduodenoscopy (N/A, 2020); Colonoscopy (N/A, 3/5/2021); d & c hysteroscopy (N/A, 2021); Dilation and curettage of uterus; and Hysteroscopy.  Her family history includes No Known Problems in her father and mother.  She  reports that she has never smoked. She has never used smokeless tobacco. She reports that she does not drink alcohol and does not use drugs.  Current Outpatient Medications   Medication Sig Dispense Refill    omeprazole (priLOSEC) 20 MG capsule Take 1 capsule by mouth Daily. 30 capsule 5     No current facility-administered medications for this visit.     Current Outpatient Medications on File Prior to Visit   Medication Sig    omeprazole (priLOSEC) 20 MG capsule Take 1 capsule by mouth Daily.    [DISCONTINUED] ibuprofen (ADVIL,MOTRIN) 600 MG tablet Take 1 tablet by mouth Every 6 (Six) Hours As Needed for Mild Pain or Moderate Pain.    [DISCONTINUED] medroxyPROGESTERone (Provera) 10 MG tablet Take 1 tablet by mouth Daily.     No current facility-administered medications on file prior to visit.     She has No Known  "Allergies.    Social History    Tobacco Use      Smoking status: Never      Smokeless tobacco: Never    Review of Systems  Consitutional POS: nothing reported    NEG: anorexia or night sweats   Gastointestinal POS: nothing reported    NEG: bloating, change in bowel habits, melena, or reflux symptoms   Genitourinary POS: nothing reported    NEG: dysuria or hematuria   Integument POS: nothing reported    NEG: moles that are changing in size, shape, color or rashes   Breast POS: nothing reported    NEG: persistent breast lump, skin dimpling, or nipple discharge         Respiratory: negative  Cardiovascular: negative  GYN:  negative          Objective   /78   Ht 162.6 cm (64\")   Wt 65.6 kg (144 lb 11.2 oz)   LMP 12/05/2024 (Exact Date)   BMI 24.84 kg/m²     General:  well developed; well nourished  no acute distress  mentation appropriate   Skin:  No suspicious lesions seen   Thyroid: not examined   Lungs:  breathing is unlabored  clear to auscultation bilaterally   Heart:  regular rate and rhythm, S1, S2 normal, no murmur, click, rub or gallop   Breasts:  Not performed.   Abdomen: soft, non-tender; no masses  no umbilical or inguinal hernias are present  no hepato-splenomegaly   Pelvis: Not performed.     Psychiatric: Alert and oriented ×3, mood and affect appropriate  HEENT: Atraumatic, normocephalic, normal scleral icterus  Extremities: 2+ pulses bilaterally, no edema      Lab Review   No data reviewed    Imaging   Uterus is retroverted volume 191 cm³ slightly increased from prior imaging.  Intramural fibroids less than 2 cm of slightly increased x 2.  Endometrium 20 mm thickened.  Simple cysts left adnexa.  Normal right adnexa.  Trace free fluid f, no solid masses     Assessment   Menometrorrhagia with resultant anemia  Fibroid uterus with thickened endometrial lining and overall volume modestly increased from 121 cm³ 3 years ago 291 cm³ today     Plan   Options discussed would recommend definitive " management as this is recurrent and the overall volume of the uterus is increased in size.  At the minimum would recommend repeat hysteroscopy with D&C.  Awaiting laboratory work once primary care office is open and will contact patient regarding this as patient may need iron infusion-she is vegetarian and iron intake is difficult.  Patient will call back with decision regarding surgery.      No orders of the defined types were placed in this encounter.         This note was electronically signed.      January 15, 2025

## 2025-01-21 ENCOUNTER — HOSPITAL ENCOUNTER (OUTPATIENT)
Facility: HOSPITAL | Age: 45
Discharge: HOME | End: 2025-01-22
Attending: EMERGENCY MEDICINE | Admitting: INTERNAL MEDICINE
Payer: COMMERCIAL

## 2025-01-21 DIAGNOSIS — N92.1 MENORRHAGIA WITH IRREGULAR CYCLE: ICD-10-CM

## 2025-01-21 DIAGNOSIS — D62 ACUTE BLOOD LOSS ANEMIA: Primary | ICD-10-CM

## 2025-01-21 LAB
ABO GROUP BLD: NORMAL
ALBUMIN SERPL-MCNC: 4.2 G/DL (ref 3.5–5.2)
ALBUMIN/GLOB SERPL: 1.6 G/DL
ALP SERPL-CCNC: 64 U/L (ref 39–117)
ALT SERPL W P-5'-P-CCNC: 8 U/L (ref 1–33)
ANION GAP SERPL CALCULATED.3IONS-SCNC: 12.4 MMOL/L (ref 5–15)
APTT PPP: 21.5 SECONDS (ref 70–100)
AST SERPL-CCNC: 13 U/L (ref 1–32)
BASOPHILS # BLD AUTO: 0.03 10*3/MM3 (ref 0–0.2)
BASOPHILS NFR BLD AUTO: 0.8 % (ref 0–1.5)
BILIRUB SERPL-MCNC: 0.8 MG/DL (ref 0–1.2)
BLD GP AB SCN SERPL QL: NEGATIVE
BUN SERPL-MCNC: 9 MG/DL (ref 6–20)
BUN/CREAT SERPL: 14.1 (ref 7–25)
CALCIUM SPEC-SCNC: 9 MG/DL (ref 8.6–10.5)
CHLORIDE SERPL-SCNC: 103 MMOL/L (ref 98–107)
CO2 SERPL-SCNC: 19.6 MMOL/L (ref 22–29)
CREAT SERPL-MCNC: 0.64 MG/DL (ref 0.57–1)
DEPRECATED RDW RBC AUTO: 44.2 FL (ref 37–54)
EGFRCR SERPLBLD CKD-EPI 2021: 110.5 ML/MIN/1.73
EOSINOPHIL # BLD AUTO: 0.03 10*3/MM3 (ref 0–0.4)
EOSINOPHIL NFR BLD AUTO: 0.8 % (ref 0.3–6.2)
ERYTHROCYTE [DISTWIDTH] IN BLOOD BY AUTOMATED COUNT: 18.2 % (ref 12.3–15.4)
GLOBULIN UR ELPH-MCNC: 2.7 GM/DL
GLUCOSE SERPL-MCNC: 124 MG/DL (ref 65–99)
HCT VFR BLD AUTO: 16.3 % (ref 34–46.6)
HGB BLD-MCNC: 4 G/DL (ref 12–15.9)
HYPOCHROMIA BLD QL: NORMAL
IMM GRANULOCYTES # BLD AUTO: 0.01 10*3/MM3 (ref 0–0.05)
IMM GRANULOCYTES NFR BLD AUTO: 0.3 % (ref 0–0.5)
INR PPP: 1.05 (ref 0.9–1.1)
LYMPHOCYTES # BLD AUTO: 0.72 10*3/MM3 (ref 0.7–3.1)
LYMPHOCYTES NFR BLD AUTO: 20.1 % (ref 19.6–45.3)
MAGNESIUM SERPL-MCNC: 2 MG/DL (ref 1.6–2.6)
MCH RBC QN AUTO: 16.6 PG (ref 26.6–33)
MCHC RBC AUTO-ENTMCNC: 24.5 G/DL (ref 31.5–35.7)
MCV RBC AUTO: 67.6 FL (ref 79–97)
MICROCYTES BLD QL: NORMAL
MONOCYTES # BLD AUTO: 0.32 10*3/MM3 (ref 0.1–0.9)
MONOCYTES NFR BLD AUTO: 8.9 % (ref 5–12)
NEUTROPHILS NFR BLD AUTO: 2.48 10*3/MM3 (ref 1.7–7)
NEUTROPHILS NFR BLD AUTO: 69.1 % (ref 42.7–76)
NRBC BLD AUTO-RTO: 0.6 /100 WBC (ref 0–0.2)
PLATELET # BLD AUTO: 219 10*3/MM3 (ref 140–450)
PMV BLD AUTO: 9.9 FL (ref 6–12)
POTASSIUM SERPL-SCNC: 3.8 MMOL/L (ref 3.5–5.2)
PROT SERPL-MCNC: 6.9 G/DL (ref 6–8.5)
PROTHROMBIN TIME: 14.2 SECONDS (ref 12.3–15.1)
RBC # BLD AUTO: 2.41 10*6/MM3 (ref 3.77–5.28)
RH BLD: POSITIVE
SMALL PLATELETS BLD QL SMEAR: ADEQUATE
SODIUM SERPL-SCNC: 135 MMOL/L (ref 136–145)
T&S EXPIRATION DATE: NORMAL
WBC MORPH BLD: NORMAL
WBC NRBC COR # BLD AUTO: 3.59 10*3/MM3 (ref 3.4–10.8)

## 2025-01-21 PROCEDURE — 86900 BLOOD TYPING SEROLOGIC ABO: CPT | Performed by: EMERGENCY MEDICINE

## 2025-01-21 PROCEDURE — 86900 BLOOD TYPING SEROLOGIC ABO: CPT

## 2025-01-21 PROCEDURE — 80053 COMPREHEN METABOLIC PANEL: CPT | Performed by: EMERGENCY MEDICINE

## 2025-01-21 PROCEDURE — 85007 BL SMEAR W/DIFF WBC COUNT: CPT | Performed by: EMERGENCY MEDICINE

## 2025-01-21 PROCEDURE — 85730 THROMBOPLASTIN TIME PARTIAL: CPT | Performed by: EMERGENCY MEDICINE

## 2025-01-21 PROCEDURE — 85610 PROTHROMBIN TIME: CPT | Performed by: EMERGENCY MEDICINE

## 2025-01-21 PROCEDURE — 36415 COLL VENOUS BLD VENIPUNCTURE: CPT

## 2025-01-21 PROCEDURE — P9016 RBC LEUKOCYTES REDUCED: HCPCS

## 2025-01-21 PROCEDURE — 83735 ASSAY OF MAGNESIUM: CPT | Performed by: EMERGENCY MEDICINE

## 2025-01-21 PROCEDURE — 36430 TRANSFUSION BLD/BLD COMPNT: CPT

## 2025-01-21 PROCEDURE — G0378 HOSPITAL OBSERVATION PER HR: HCPCS

## 2025-01-21 PROCEDURE — 99285 EMERGENCY DEPT VISIT HI MDM: CPT

## 2025-01-21 PROCEDURE — 86850 RBC ANTIBODY SCREEN: CPT | Performed by: EMERGENCY MEDICINE

## 2025-01-21 PROCEDURE — 99291 CRITICAL CARE FIRST HOUR: CPT | Performed by: EMERGENCY MEDICINE

## 2025-01-21 PROCEDURE — 86901 BLOOD TYPING SEROLOGIC RH(D): CPT | Performed by: EMERGENCY MEDICINE

## 2025-01-21 PROCEDURE — 85025 COMPLETE CBC W/AUTO DIFF WBC: CPT | Performed by: EMERGENCY MEDICINE

## 2025-01-21 PROCEDURE — 86920 COMPATIBILITY TEST SPIN: CPT

## 2025-01-21 RX ORDER — PANTOPRAZOLE SODIUM 40 MG/1
40 TABLET, DELAYED RELEASE ORAL
Status: DISCONTINUED | OUTPATIENT
Start: 2025-01-22 | End: 2025-01-22 | Stop reason: HOSPADM

## 2025-01-21 RX ORDER — SODIUM CHLORIDE 0.9 % (FLUSH) 0.9 %
10 SYRINGE (ML) INJECTION AS NEEDED
Status: DISCONTINUED | OUTPATIENT
Start: 2025-01-21 | End: 2025-01-22 | Stop reason: HOSPADM

## 2025-01-22 VITALS
OXYGEN SATURATION: 100 % | WEIGHT: 144.84 LBS | DIASTOLIC BLOOD PRESSURE: 93 MMHG | RESPIRATION RATE: 18 BRPM | TEMPERATURE: 99.2 F | HEART RATE: 83 BPM | BODY MASS INDEX: 25.66 KG/M2 | SYSTOLIC BLOOD PRESSURE: 134 MMHG | HEIGHT: 63 IN

## 2025-01-22 LAB
ALBUMIN SERPL-MCNC: 3.9 G/DL (ref 3.5–5.2)
ALBUMIN/GLOB SERPL: 1.5 G/DL
ALP SERPL-CCNC: 59 U/L (ref 39–117)
ALT SERPL W P-5'-P-CCNC: 15 U/L (ref 1–33)
ANION GAP SERPL CALCULATED.3IONS-SCNC: 13.5 MMOL/L (ref 5–15)
AST SERPL-CCNC: 23 U/L (ref 1–32)
BASOPHILS # BLD AUTO: 0.03 10*3/MM3 (ref 0–0.2)
BASOPHILS NFR BLD AUTO: 0.6 % (ref 0–1.5)
BILIRUB SERPL-MCNC: 1.6 MG/DL (ref 0–1.2)
BUN SERPL-MCNC: 6 MG/DL (ref 6–20)
BUN/CREAT SERPL: 9.8 (ref 7–25)
CALCIUM SPEC-SCNC: 8.9 MG/DL (ref 8.6–10.5)
CHLORIDE SERPL-SCNC: 106 MMOL/L (ref 98–107)
CO2 SERPL-SCNC: 18.5 MMOL/L (ref 22–29)
CREAT SERPL-MCNC: 0.61 MG/DL (ref 0.57–1)
DEPRECATED RDW RBC AUTO: 54.1 FL (ref 37–54)
EGFRCR SERPLBLD CKD-EPI 2021: 111.8 ML/MIN/1.73
EOSINOPHIL # BLD AUTO: 0.02 10*3/MM3 (ref 0–0.4)
EOSINOPHIL NFR BLD AUTO: 0.4 % (ref 0.3–6.2)
ERYTHROCYTE [DISTWIDTH] IN BLOOD BY AUTOMATED COUNT: 19.9 % (ref 12.3–15.4)
GLOBULIN UR ELPH-MCNC: 2.6 GM/DL
GLUCOSE SERPL-MCNC: 129 MG/DL (ref 65–99)
HCT VFR BLD AUTO: 30.4 % (ref 34–46.6)
HGB BLD-MCNC: 9.7 G/DL (ref 12–15.9)
HYPOCHROMIA BLD QL: NORMAL
IMM GRANULOCYTES # BLD AUTO: 0.04 10*3/MM3 (ref 0–0.05)
IMM GRANULOCYTES NFR BLD AUTO: 0.8 % (ref 0–0.5)
LYMPHOCYTES # BLD AUTO: 0.74 10*3/MM3 (ref 0.7–3.1)
LYMPHOCYTES NFR BLD AUTO: 15.1 % (ref 19.6–45.3)
MCH RBC QN AUTO: 23.8 PG (ref 26.6–33)
MCHC RBC AUTO-ENTMCNC: 31.9 G/DL (ref 31.5–35.7)
MCV RBC AUTO: 74.5 FL (ref 79–97)
MICROCYTES BLD QL: NORMAL
MONOCYTES # BLD AUTO: 0.35 10*3/MM3 (ref 0.1–0.9)
MONOCYTES NFR BLD AUTO: 7.1 % (ref 5–12)
NEUTROPHILS NFR BLD AUTO: 3.73 10*3/MM3 (ref 1.7–7)
NEUTROPHILS NFR BLD AUTO: 76 % (ref 42.7–76)
NRBC BLD AUTO-RTO: 0.6 /100 WBC (ref 0–0.2)
PLAT MORPH BLD: NORMAL
PLATELET # BLD AUTO: 163 10*3/MM3 (ref 140–450)
PMV BLD AUTO: 10.1 FL (ref 6–12)
POTASSIUM SERPL-SCNC: 3.6 MMOL/L (ref 3.5–5.2)
PROT SERPL-MCNC: 6.5 G/DL (ref 6–8.5)
RBC # BLD AUTO: 4.08 10*6/MM3 (ref 3.77–5.28)
SODIUM SERPL-SCNC: 138 MMOL/L (ref 136–145)
WBC MORPH BLD: NORMAL
WBC NRBC COR # BLD AUTO: 4.91 10*3/MM3 (ref 3.4–10.8)

## 2025-01-22 PROCEDURE — P9016 RBC LEUKOCYTES REDUCED: HCPCS

## 2025-01-22 PROCEDURE — G0378 HOSPITAL OBSERVATION PER HR: HCPCS

## 2025-01-22 PROCEDURE — 86900 BLOOD TYPING SEROLOGIC ABO: CPT

## 2025-01-22 PROCEDURE — 80053 COMPREHEN METABOLIC PANEL: CPT | Performed by: INTERNAL MEDICINE

## 2025-01-22 PROCEDURE — 85025 COMPLETE CBC W/AUTO DIFF WBC: CPT | Performed by: INTERNAL MEDICINE

## 2025-01-22 PROCEDURE — 85007 BL SMEAR W/DIFF WBC COUNT: CPT | Performed by: INTERNAL MEDICINE

## 2025-01-22 PROCEDURE — 36430 TRANSFUSION BLD/BLD COMPNT: CPT

## 2025-01-22 RX ORDER — MEDROXYPROGESTERONE ACETATE 10 MG
10 TABLET ORAL DAILY
Status: DISCONTINUED | OUTPATIENT
Start: 2025-01-22 | End: 2025-01-22 | Stop reason: HOSPADM

## 2025-01-22 RX ORDER — MEDROXYPROGESTERONE ACETATE 10 MG
10 TABLET ORAL DAILY
Qty: 30 TABLET | Refills: 5 | Status: SHIPPED | OUTPATIENT
Start: 2025-01-22

## 2025-01-22 RX ADMIN — MEDROXYPROGESTERONE ACETATE 10 MG: 10 TABLET ORAL at 11:05

## 2025-01-22 RX ADMIN — PANTOPRAZOLE SODIUM 40 MG: 40 TABLET, DELAYED RELEASE ORAL at 08:24

## 2025-01-22 NOTE — CASE MANAGEMENT/SOCIAL WORK
Case Management Discharge Note                Selected Continued Care - Discharged on 1/22/2025 Admission date: 1/21/2025 - Discharge disposition: Home or Self Care      Destination    No services have been selected for the patient.                Durable Medical Equipment    No services have been selected for the patient.                Dialysis/Infusion    No services have been selected for the patient.                Home Medical Care    No services have been selected for the patient.                Therapy    No services have been selected for the patient.                Community Resources    No services have been selected for the patient.                Community & DME    No services have been selected for the patient.                    Transportation Services  Private: Car    Final Discharge Disposition Code: 01 - home or self-care

## 2025-01-22 NOTE — DISCHARGE SUMMARY
[facility]   INTERNAL MEDICINE DISCHARGE SUMMARY      Name:  Lana Beasley   Age:  44 y.o.  Sex:  female  :  1980  MRN:  [id number]   Visit Number:  [id number]  Primary Care Physician:  Tra Murphy MD  Date of Discharge:  2025  Admission Date:  2025      Discharge Diagnosis:         Acute blood loss anemia    Symptomatic anemia    Menometrorrhagia    Iron deficiency anemia due to chronic blood loss          Consults:     Consults       Date and Time Order Name Status Description    2025  9:41 PM Inpatient Obstetrics / Gynecology Consult              Procedures Performed:                 Hospital Course:   The patient was admitted on 2025  Please see H&P for details on admission HPI and ROS.  44-year-old patient with history of iron deficiency anemia who has been having menometrorrhagia with chronic bleeding issues was seen by me in the office yesterday with complaints of severe fatigue dyspnea shortness of breath on minimal exertion.  She looked pale and very fatigued.  I had ordered labs and the report came back with hemoglobin of 4.0 today.  So I called her to come to the ER and she is being admitted for further management of the above  She denies any active bleeding at present and order for type and cross and blood transfusion has been given and she is admitted further for observation  After admission patient has had 4 units of blood transfusion.  She is finishing the fourth unit and 2 hours posttransfusion will repeat hemoglobin.  Patient is feeling better.  GYN consult has been done and pending.  She is hemodynamically better and should be stable to be discharged later on in the afternoon today I will follow her back in the office in 10 to 14 days and repeat labs to be done and further management    Patient.  She is to continue her omeprazole and will probably decide to go undergo hysterectomy which she will discuss with the gynecologist.    Vital Signs:  "    Temp:  [97.8 °F (36.6 °C)-99.2 °F (37.3 °C)] 99.2 °F (37.3 °C)  Heart Rate:  [] 83  Resp:  [16-18] 18  BP: (117-134)/(73-96) 134/93    Physical Exam:     General Appearance:    Alert and cooperative, not in any acute distress.   Head:    Atraumatic and normocephalic, without obvious abnormality.   Eyes:            PERRLA.  No pallor. Extraocular movements are within normal limits.   Neck:   Supple. No lymph glands, no bruit.   Lungs:     Chest shape is normal. Breath sounds heard bilaterally equally.  No crackles or wheezing.     Heart:    Normal S1 and S2, no murmur,  no JVD.   Abdomen:     Normal bowel sounds, no masses, no organomegaly. Soft     nontender, no guarding, no rebound tenderness.   Extremities:   Moves all extremities well. No edema, no cyanosis.   Skin:   No bruising or rash.   Neurologic:   Grossly nonfocal and moves all extremities.        Pertinent Lab Results:     Results from last 7 days   Lab Units 01/21/25  1859   SODIUM mmol/L 135*   POTASSIUM mmol/L 3.8   CHLORIDE mmol/L 103   CO2 mmol/L 19.6*   BUN mg/dL 9   CREATININE mg/dL 0.64   CALCIUM mg/dL 9.0   BILIRUBIN mg/dL 0.8   ALK PHOS U/L 64   ALT (SGPT) U/L 8   AST (SGOT) U/L 13   GLUCOSE mg/dL 124*     Results from last 7 days   Lab Units 01/21/25  1859   WBC 10*3/mm3 3.59   HEMOGLOBIN g/dL 4.0*   HEMATOCRIT % 16.3*   PLATELETS 10*3/mm3 219     Results from last 7 days   Lab Units 01/21/25  1859   INR  1.05     No results found for: \"BLOODCX\", \"URINECX\", \"WOUNDCX\", \"MRSACX\"    Pertinent Radiology Results:    Imaging Results (Most Recent)       None                    Discharge Disposition:      Home or Self Care    Discharge Medication:         Discharge Medications        Continue These Medications        Instructions Start Date   omeprazole 20 MG capsule  Commonly known as: priLOSEC   20 mg, Oral, Daily               Discharge Diet:             Follow-up Appointments:      No future appointments.      Test Results Pending at " Discharge:             Tra Murphy MD  01/22/25  09:28 EST    Time:  Discharge 24 min    Please note that portions of this note were completed with a voice recognition program.

## 2025-01-22 NOTE — H&P
[facility]   HISTORY AND PHYSICAL      Name:  Lana Beasley   Age:  44 y.o.  Sex:  female  :  1980  MRN:  [id number]   Visit Number:  [id number]  Admission Date:  2025  Date Of Service:  25  Primary Care Physician:  Tra Murphy MD     Admitting diagnosis:      Acute blood loss anemia  Iron deficiency anemia  GERD  Menometrorrhagia      History Of Presenting Illness:      44-year-old patient with history of iron deficiency anemia who has been having menometrorrhagia with chronic bleeding issues was seen by me in the office yesterday with complaints of severe fatigue dyspnea shortness of breath on minimal exertion.  She looked pale and very fatigued.  I had ordered labs and the report came back with hemoglobin of 4.0 today.  So I called her to come to the ER and she is being admitted for further management of the above  She denies any active bleeding at present and order for type and cross and blood transfusion has been given and she is admitted further for observation  GYN consult to be done in the morning and patient agrees as discussed    Review Of Systems:     The following systems were reviewed and negative;  constitution, eyes, ENT, respiratory, cardiovascular, gastrointestinal, genitourinary, musculoskeletal, neurological and behavioral/psych,  Skin except as above.     Past Medical History:    Past Medical History:   Diagnosis Date    Anemia     Body piercing     both ears    COVID-19 vaccine series completed     phizer    GERD (gastroesophageal reflux disease)     History of blood transfusion     no reaction    Iron deficiency     Wears glasses        Past Surgical history:    Past Surgical History:   Procedure Laterality Date    COLONOSCOPY N/A 3/5/2021    Procedure: COLONOSCOPY WITH BIOPSY;  Surgeon: Jay Sharma MD;  Location: [facility] ENDOSCOPY;  Service: Gastroenterology;  Laterality: N/A;    D & C HYSTEROSCOPY N/A 2021     Procedure: DILATATION AND CURETTAGE HYSTEROSCOPY;  Surgeon: Andrew Hackett MD;  Location: [facility] OR;  Service: Obstetrics/Gynecology;  Laterality: N/A;    DILATATION AND CURETTAGE      ENDOSCOPY N/A 12/11/2020    Procedure: ESOPHAGOGASTRODUODENOSCOPY WITH BIOPSY;  Surgeon: Jay Sharma MD;  Location: [facility] ENDOSCOPY;  Service: Gastroenterology;  Laterality: N/A;    HYSTEROSCOPY         Social History:    Social History     Socioeconomic History    Marital status:    Tobacco Use    Smoking status: Never    Smokeless tobacco: Never   Vaping Use    Vaping status: Never Used   Substance and Sexual Activity    Alcohol use: Never    Drug use: Never    Sexual activity: Not Currently     Birth control/protection: None       Family History:    Family History   Problem Relation Age of Onset    No Known Problems Father     No Known Problems Mother     Colon cancer Neg Hx     Cirrhosis Neg Hx     Liver cancer Neg Hx     Liver disease Neg Hx          Allergies:      Patient has no known allergies.    Home Medications:    Prior to Admission Medications       Prescriptions Last Dose Informant Patient Reported? Taking?    omeprazole (priLOSEC) 20 MG capsule 1/21/2025  No Yes    Take 1 capsule by mouth Daily.                   Vital Signs:    Temp:  [97.8 °F (36.6 °C)-98.4 °F (36.9 °C)] 98.4 °F (36.9 °C)  Heart Rate:  [104-106] 106  Resp:  [16-18] 18  BP: (117-129)/(73-88) 126/88        01/21/25  1830 01/21/25 2005   Weight: 65.3 kg (144 lb) 65.7 kg (144 lb 13.5 oz)       Body mass index is 25.66 kg/m².    Physical Exam:      General Appearance:    Alert and cooperative, and lying comfortably in bed.,  Very pale looking   Head:    Atraumatic and normocephalic, without obvious abnormality.   Eyes:            PERRLA, conjunctivae and sclerae normal, no icterus.  Extreme pallor. Extraocular movements are within normal limits.   Ears:    Ears appear intact with no abnormalities noted.   Throat:   No oral  lesions, no thrush, oral mucosa moist.   Neck:   Supple, trachea midline, no thyromegaly, no carotid bruit, no lymphadenopathy.   Lungs:    Breath sounds heard bilaterally equally.  No crackles or wheezing. No Pleural rub or bronchial breathing.       Heart:    Normal S1 and S2, no murmur, no gallop, no rub. No JVD   Abdomen:     Normal bowel sounds, no masses, no organomegaly. Soft   nontender, nondistended, no guarding, no rebound    tenderness.   Extremities:   Moves all extremities well, no edema, no cyanosis, no          clubbing.   Skin:   No  bruising or rash.   Neurologic:   Cranial nerves 2 - 12 grossly intact, sensation intact. Motor power is normal and equal bilaterally.       EKG:      Sinus rhythm on the monitor    Labs:    Lab Results (last 24 hours)       Procedure Component Value Units Date/Time    CBC & Differential [[id number]]  (Abnormal) Collected: 01/21/25 1859    Specimen: Blood Updated: 01/21/25 1947    Narrative:      The following orders were created for panel order CBC & Differential.  Procedure                               Abnormality         Status                     ---------                               -----------         ------                     CBC Auto Differential[[id number]]        Abnormal            Final result               Scan Slide[[id number]]                                       Final result                 Please view results for these tests on the individual orders.    Scan Slide [[id number]] Collected: 01/21/25 1859    Specimen: Blood Updated: 01/21/25 1947     Hypochromia Mod/2+     Microcytes Large/3+     WBC Morphology Normal     Platelet Estimate Adequate    Protime-INR [[id number]]  (Normal) Collected: 01/21/25 1859    Specimen: Blood Updated: 01/21/25 1946     Protime 14.2 Seconds      INR 1.05    Narrative:      Suggested INR therapeutic range for stable oral anticoagulant therapy:    Low Intensity therapy:   1.5-2.0  Moderate Intensity therapy:   2.0-3.0  High  Intensity therapy:   2.5-4.0    aPTT [[id number]]  (Abnormal) Collected: 01/21/25 1859    Specimen: Blood Updated: 01/21/25 1946     PTT 21.5 seconds     Comprehensive Metabolic Panel [[id number]]  (Abnormal) Collected: 01/21/25 1859    Specimen: Blood Updated: 01/21/25 1929     Glucose 124 mg/dL      BUN 9 mg/dL      Creatinine 0.64 mg/dL      Sodium 135 mmol/L      Potassium 3.8 mmol/L      Chloride 103 mmol/L      CO2 19.6 mmol/L      Calcium 9.0 mg/dL      Total Protein 6.9 g/dL      Albumin 4.2 g/dL      ALT (SGPT) 8 U/L      AST (SGOT) 13 U/L      Alkaline Phosphatase 64 U/L      Total Bilirubin 0.8 mg/dL      Globulin 2.7 gm/dL      A/G Ratio 1.6 g/dL      BUN/Creatinine Ratio 14.1     Anion Gap 12.4 mmol/L      eGFR 110.5 mL/min/1.73     Narrative:      GFR Categories in Chronic Kidney Disease (CKD)      GFR Category          GFR (mL/min/1.73)    Interpretation  G1                     90 or greater         Normal or high (1)  G2                      60-89                Mild decrease (1)  G3a                   45-59                Mild to moderate decrease  G3b                   30-44                Moderate to severe decrease  G4                    15-29                Severe decrease  G5                    14 or less           Kidney failure          (1)In the absence of evidence of kidney disease, neither GFR category G1 or G2 fulfill the criteria for CKD.    eGFR calculation 2021 CKD-EPI creatinine equation, which does not include race as a factor    Magnesium [[id number]]  (Normal) Collected: 01/21/25 1859    Specimen: Blood Updated: 01/21/25 1929     Magnesium 2.0 mg/dL     CBC Auto Differential [[id number]]  (Abnormal) Collected: 01/21/25 1859    Specimen: Blood Updated: 01/21/25 1920     WBC 3.59 10*3/mm3      RBC 2.41 10*6/mm3      Hemoglobin 4.0 g/dL      Hematocrit 16.3 %      MCV 67.6 fL      MCH 16.6 pg      MCHC 24.5 g/dL      RDW 18.2 %      RDW-SD 44.2 fl      MPV 9.9 fL      Platelets 219  10*3/mm3      Neutrophil % 69.1 %      Lymphocyte % 20.1 %      Monocyte % 8.9 %      Eosinophil % 0.8 %      Basophil % 0.8 %      Immature Grans % 0.3 %      Neutrophils, Absolute 2.48 10*3/mm3      Lymphocytes, Absolute 0.72 10*3/mm3      Monocytes, Absolute 0.32 10*3/mm3      Eosinophils, Absolute 0.03 10*3/mm3      Basophils, Absolute 0.03 10*3/mm3      Immature Grans, Absolute 0.01 10*3/mm3      nRBC 0.6 /100 WBC             Radiology:    Imaging Results (Last 72 Hours)       ** No results found for the last 72 hours. **            Assessment:    Assessment & Plan       Acute blood loss anemia  Iron deficiency anemia  GERD  Menometrorrhagia      Plan:     44-year-old patient admitted because of severe anemia with hemoglobin of 4.0 with ongoing on and off chronic vaginal bleeding.  Admit the patient for observation and will transfuse 4 units to try and bring her hemoglobin up to at least 8.  GYN consult to be done in the morning  Goals of treatment plan of care has been addressed with the patient as above    Tra Murphy MD  01/21/25  21:15 EST    Please note that portions of this note were completed with a voice recognition program.

## 2025-01-22 NOTE — PAYOR COMM NOTE
"TO:[facility]  FROM:ISABEL GARCÍA RN PHONE [phone]  CLINICALS    Lana Beasley (44 y.o. Female)       Date of Birth   1980    Social Security Number       Address   [address] [place] [address] [place] [address]    Home Phone   [phone]    MRN   [id number]       [place]   None    Marital Status                               Admission Date   25    Admission Type   Emergency    Admitting Provider   Tra Murphy MD    Attending Provider   Tra Murphy MD    Department, Room/Bed   [facility] TELEMETRY 3, 324/1       Discharge Date       Discharge Disposition   Home or Self Care    Discharge Destination                                 Attending Provider: Tra Murphy MD    Allergies: No Known Allergies    Isolation: None   Infection: None   Code Status: CPR    Ht: 160 cm (62.99\")   Wt: 65.7 kg (144 lb 13.5 oz)    Admission Cmt: None   Principal Problem: Acute blood loss anemia [D62]                   Active Insurance as of 2025       Primary Coverage       Payor Plan Insurance Group Employer/Plan Group    [employer] [place] [employer]        Payor Plan Address Payor Plan Phone Number Payor Plan Fax Number Effective Dates    [address] [address] [phone]  2/10/2021 - None Entered    [facility] [id number]         Subscriber Name Subscriber Birth Date Member ID       LANA BEASLEY 1980 [id number]                     Emergency Contacts        (Rel.) Home Phone Work Phone Mobile Phone    ZAINAB BEASLEY (Spouse) [phone] -- --    Laverne Beasley (Daughter) -- -- [phone]                 History & Physical        Tra Murphy MD at [phone]              [facility] MEDICINE   HISTORY AND PHYSICAL      Name:  Lana Beasley   Age:  44 y.o.  Sex:  female  :  1980  MRN:  [id number]   Visit Number:  [id number]  Admission Date:  2025  Date Of Service:  25  Primary Care " Physician:  Tra Murphy MD     Admitting diagnosis:      Acute blood loss anemia  Iron deficiency anemia  GERD  Menometrorrhagia      History Of Presenting Illness:      44-year-old patient with history of iron deficiency anemia who has been having menometrorrhagia with chronic bleeding issues was seen by me in the office yesterday with complaints of severe fatigue dyspnea shortness of breath on minimal exertion.  She looked pale and very fatigued.  I had ordered labs and the report came back with hemoglobin of 4.0 today.  So I called her to come to the ER and she is being admitted for further management of the above  She denies any active bleeding at present and order for type and cross and blood transfusion has been given and she is admitted further for observation  GYN consult to be done in the morning and patient agrees as discussed    Review Of Systems:     The following systems were reviewed and negative;  constitution, eyes, ENT, respiratory, cardiovascular, gastrointestinal, genitourinary, musculoskeletal, neurological and behavioral/psych,  Skin except as above.     Past Medical History:    Past Medical History:   Diagnosis Date    Anemia     Body piercing     both ears    COVID-19 vaccine series completed     phizer    GERD (gastroesophageal reflux disease)     History of blood transfusion     no reaction    Iron deficiency     Wears glasses        Past Surgical history:    Past Surgical History:   Procedure Laterality Date    COLONOSCOPY N/A 3/5/2021    Procedure: COLONOSCOPY WITH BIOPSY;  Surgeon: Jay Sharma MD;  Location: [facility] ENDOSCOPY;  Service: Gastroenterology;  Laterality: N/A;    D & C HYSTEROSCOPY N/A 8/25/2021    Procedure: DILATATION AND CURETTAGE HYSTEROSCOPY;  Surgeon: Andrew Hackett MD;  Location: [facility] OR;  Service: Obstetrics/Gynecology;  Laterality: N/A;    DILATATION AND CURETTAGE      ENDOSCOPY N/A 12/11/2020    Procedure: ESOPHAGOGASTRODUODENOSCOPY WITH  BIOPSY;  Surgeon: Jay Sharma MD;  Location: [facility] ENDOSCOPY;  Service: Gastroenterology;  Laterality: N/A;    HYSTEROSCOPY         Social History:    Social History     Socioeconomic History    Marital status:    Tobacco Use    Smoking status: Never    Smokeless tobacco: Never   Vaping Use    Vaping status: Never Used   Substance and Sexual Activity    Alcohol use: Never    Drug use: Never    Sexual activity: Not Currently     Birth control/protection: None       Family History:    Family History   Problem Relation Age of Onset    No Known Problems Father     No Known Problems Mother     Colon cancer Neg Hx     Cirrhosis Neg Hx     Liver cancer Neg Hx     Liver disease Neg Hx          Allergies:      Patient has no known allergies.    Home Medications:    Prior to Admission Medications       Prescriptions Last Dose Informant Patient Reported? Taking?    omeprazole (priLOSEC) 20 MG capsule 1/21/2025  No Yes    Take 1 capsule by mouth Daily.                   Vital Signs:    Temp:  [97.8 °F (36.6 °C)-98.4 °F (36.9 °C)] 98.4 °F (36.9 °C)  Heart Rate:  [104-106] 106  Resp:  [16-18] 18  BP: (117-129)/(73-88) 126/88        01/21/25  1830 01/21/25 2005   Weight: 65.3 kg (144 lb) 65.7 kg (144 lb 13.5 oz)       Body mass index is 25.66 kg/m².    Physical Exam:      General Appearance:    Alert and cooperative, and lying comfortably in bed.,  Very pale looking   Head:    Atraumatic and normocephalic, without obvious abnormality.   Eyes:            PERRLA, conjunctivae and sclerae normal, no icterus.  Extreme pallor. Extraocular movements are within normal limits.   Ears:    Ears appear intact with no abnormalities noted.   Throat:   No oral lesions, no thrush, oral mucosa moist.   Neck:   Supple, trachea midline, no thyromegaly, no carotid bruit, no lymphadenopathy.   Lungs:    Breath sounds heard bilaterally equally.  No crackles or wheezing. No Pleural rub or bronchial breathing.       Heart:    Normal  S1 and S2, no murmur, no gallop, no rub. No JVD   Abdomen:     Normal bowel sounds, no masses, no organomegaly. Soft   nontender, nondistended, no guarding, no rebound    tenderness.   Extremities:   Moves all extremities well, no edema, no cyanosis, no          clubbing.   Skin:   No  bruising or rash.   Neurologic:   Cranial nerves 2 - 12 grossly intact, sensation intact. Motor power is normal and equal bilaterally.       EKG:      Sinus rhythm on the monitor    Labs:    Lab Results (last 24 hours)       Procedure Component Value Units Date/Time    CBC & Differential [[id number]]  (Abnormal) Collected: 01/21/25 1859    Specimen: Blood Updated: 01/21/25 1947    Narrative:      The following orders were created for panel order CBC & Differential.  Procedure                               Abnormality         Status                     ---------                               -----------         ------                     CBC Auto Differential[[id number]]        Abnormal            Final result               Scan Slide[[id number]]                                       Final result                 Please view results for these tests on the individual orders.    Scan Slide [[id number]] Collected: 01/21/25 1859    Specimen: Blood Updated: 01/21/25 1947     Hypochromia Mod/2+     Microcytes Large/3+     WBC Morphology Normal     Platelet Estimate Adequate    Protime-INR [[id number]]  (Normal) Collected: 01/21/25 1859    Specimen: Blood Updated: 01/21/25 1946     Protime 14.2 Seconds      INR 1.05    Narrative:      Suggested INR therapeutic range for stable oral anticoagulant therapy:    Low Intensity therapy:   1.5-2.0  Moderate Intensity therapy:   2.0-3.0  High Intensity therapy:   2.5-4.0    aPTT [[id number]]  (Abnormal) Collected: 01/21/25 1859    Specimen: Blood Updated: 01/21/25 1946     PTT 21.5 seconds     Comprehensive Metabolic Panel [[id number]]  (Abnormal) Collected: 01/21/25 1859    Specimen: Blood Updated: 01/21/25  1929     Glucose 124 mg/dL      BUN 9 mg/dL      Creatinine 0.64 mg/dL      Sodium 135 mmol/L      Potassium 3.8 mmol/L      Chloride 103 mmol/L      CO2 19.6 mmol/L      Calcium 9.0 mg/dL      Total Protein 6.9 g/dL      Albumin 4.2 g/dL      ALT (SGPT) 8 U/L      AST (SGOT) 13 U/L      Alkaline Phosphatase 64 U/L      Total Bilirubin 0.8 mg/dL      Globulin 2.7 gm/dL      A/G Ratio 1.6 g/dL      BUN/Creatinine Ratio 14.1     Anion Gap 12.4 mmol/L      eGFR 110.5 mL/min/1.73     Narrative:      GFR Categories in Chronic Kidney Disease (CKD)      GFR Category          GFR (mL/min/1.73)    Interpretation  G1                     90 or greater         Normal or high (1)  G2                      60-89                Mild decrease (1)  G3a                   45-59                Mild to moderate decrease  G3b                   30-44                Moderate to severe decrease  G4                    15-29                Severe decrease  G5                    14 or less           Kidney failure          (1)In the absence of evidence of kidney disease, neither GFR category G1 or G2 fulfill the criteria for CKD.    eGFR calculation 2021 CKD-EPI creatinine equation, which does not include race as a factor    Magnesium [[id number]]  (Normal) Collected: 01/21/25 1859    Specimen: Blood Updated: 01/21/25 1929     Magnesium 2.0 mg/dL     CBC Auto Differential [[id number]]  (Abnormal) Collected: 01/21/25 1859    Specimen: Blood Updated: 01/21/25 1920     WBC 3.59 10*3/mm3      RBC 2.41 10*6/mm3      Hemoglobin 4.0 g/dL      Hematocrit 16.3 %      MCV 67.6 fL      MCH 16.6 pg      MCHC 24.5 g/dL      RDW 18.2 %      RDW-SD 44.2 fl      MPV 9.9 fL      Platelets 219 10*3/mm3      Neutrophil % 69.1 %      Lymphocyte % 20.1 %      Monocyte % 8.9 %      Eosinophil % 0.8 %      Basophil % 0.8 %      Immature Grans % 0.3 %      Neutrophils, Absolute 2.48 10*3/mm3      Lymphocytes, Absolute 0.72 10*3/mm3      Monocytes, Absolute 0.32 10*3/mm3       Eosinophils, Absolute 0.03 10*3/mm3      Basophils, Absolute 0.03 10*3/mm3      Immature Grans, Absolute 0.01 10*3/mm3      nRBC 0.6 /100 WBC             Radiology:    Imaging Results (Last 72 Hours)       ** No results found for the last 72 hours. **            Assessment:    Assessment & Plan      Acute blood loss anemia  Iron deficiency anemia  GERD  Menometrorrhagia      Plan:     44-year-old patient admitted because of severe anemia with hemoglobin of 4.0 with ongoing on and off chronic vaginal bleeding.  Admit the patient for observation and will transfuse 4 units to try and bring her hemoglobin up to at least 8.  GYN consult to be done in the morning  Goals of treatment plan of care has been addressed with the patient as above    Tra Murphy MD  25  21:15 EST    Please note that portions of this note were completed with a voice recognition program.    Electronically signed by Tra Murphy MD at 25 2120          Emergency Department Notes        Tom Walsh MD at 25 193        Procedure Orders    1. Critical Care [[id number]] ordered by Tom Walsh MD                  EMERGENCY DEPARTMENT ENCOUNTER    Pt Name: Lana Beasley  MRN: [id number]  Pt :   1980  Room Number:    Date of encounter:  2025  PCP: Tra Murphy MD  ED Provider: Tom Walsh MD    Historian: Patient, Family, and as       HPI:  Chief Complaint   Patient presents with    Abnormal Lab          Context: Lana Beasley is a 44 y.o. female who presents to the ED c/o anemia, the patient has a history of menorrhagia, as well as anemia, she has had iron deficiency and blood transfusions in the past.  She follow-up with her primary care physician recently and had a blood draw which showed a hemoglobin of 4.  Called and advised to come to the ER, she is following up with OB/GYN with discussion of possible treatments for her menorrhagia.   She reports some weakness, and shortness of breath.  No active bleeding currently.      PAST MEDICAL HISTORY  Past Medical History:   Diagnosis Date    Anemia     Body piercing     both ears    COVID-19 vaccine series completed     phizer    GERD (gastroesophageal reflux disease)     History of blood transfusion     no reaction    Iron deficiency     Wears glasses          PAST SURGICAL HISTORY  Past Surgical History:   Procedure Laterality Date    COLONOSCOPY N/A 3/5/2021    Procedure: COLONOSCOPY WITH BIOPSY;  Surgeon: Jay Sharma MD;  Location: [facility] ENDOSCOPY;  Service: Gastroenterology;  Laterality: N/A;    D & C HYSTEROSCOPY N/A 8/25/2021    Procedure: DILATATION AND CURETTAGE HYSTEROSCOPY;  Surgeon: Andrew Hackett MD;  Location: [facility] OR;  Service: Obstetrics/Gynecology;  Laterality: N/A;    DILATATION AND CURETTAGE      ENDOSCOPY N/A 12/11/2020    Procedure: ESOPHAGOGASTRODUODENOSCOPY WITH BIOPSY;  Surgeon: Jay Sharma MD;  Location: [facility] ENDOSCOPY;  Service: Gastroenterology;  Laterality: N/A;    HYSTEROSCOPY           FAMILY HISTORY  Family History   Problem Relation Age of Onset    No Known Problems Father     No Known Problems Mother     Colon cancer Neg Hx     Cirrhosis Neg Hx     Liver cancer Neg Hx     Liver disease Neg Hx          SOCIAL HISTORY  Social History     Socioeconomic History    Marital status:    Tobacco Use    Smoking status: Never    Smokeless tobacco: Never   Vaping Use    Vaping status: Never Used   Substance and Sexual Activity    Alcohol use: Never    Drug use: Never    Sexual activity: Not Currently     Birth control/protection: None         ALLERGIES  Patient has no known allergies.        REVIEW OF SYSTEMS  Review of Systems   Constitutional:  Negative for chills and fever.   HENT:  Negative for sore throat and trouble swallowing.    Eyes:  Negative for pain and redness.   Respiratory:  Positive for shortness of breath. Negative for  cough.    Cardiovascular:  Negative for chest pain and leg swelling.   Gastrointestinal:  Negative for abdominal pain, nausea and vomiting.   Genitourinary:  Negative for dysuria and urgency.   Musculoskeletal:  Negative for back pain and neck pain.   Skin:  Negative for rash and wound.   Neurological:  Positive for weakness. Negative for dizziness.        All systems reviewed and negative except for those discussed in HPI.       PHYSICAL EXAM    I have reviewed the triage vital signs and nursing notes.    ED Triage Vitals   Temp Heart Rate Resp BP SpO2   01/21/25 1830 01/21/25 1852 01/21/25 1830 01/21/25 1830 01/21/25 1852   97.8 °F (36.6 °C) 105 16 129/73 100 %      Temp src Heart Rate Source Patient Position BP Location FiO2 (%)   01/21/25 1830 01/21/25 1852 01/21/25 1830 01/21/25 1830 --   Oral Monitor Sitting Left arm        Physical Exam  Constitutional:       Appearance: Normal appearance. She is not ill-appearing.   HENT:      Head: Normocephalic and atraumatic.      Right Ear: External ear normal.      Left Ear: External ear normal.      Nose: Nose normal.      Mouth/Throat:      Mouth: Mucous membranes are moist.      Pharynx: Oropharynx is clear.   Eyes:      Extraocular Movements: Extraocular movements intact.      Conjunctiva/sclera: Conjunctivae normal.      Pupils: Pupils are equal, round, and reactive to light.      Comments: Conjunctival pallor   Cardiovascular:      Rate and Rhythm: Normal rate and regular rhythm.      Pulses:           Radial pulses are 2+ on the right side and 2+ on the left side.   Pulmonary:      Effort: Pulmonary effort is normal.      Breath sounds: Normal breath sounds.   Abdominal:      General: There is no distension.      Palpations: Abdomen is soft.      Tenderness: There is no abdominal tenderness.   Musculoskeletal:         General: No tenderness or deformity. Normal range of motion.      Cervical back: Normal range of motion and neck supple.      Right lower leg: No  edema.      Left lower leg: No edema.   Skin:     General: Skin is warm and dry.      Capillary Refill: Capillary refill takes less than 2 seconds.      Coloration: Skin is pale.   Neurological:      General: No focal deficit present.      Mental Status: She is alert and oriented to person, place, and time.            LAB RESULTS  Recent Results (from the past 24 hours)   Comprehensive Metabolic Panel    Collection Time: 01/21/25  6:59 PM    Specimen: Blood   Result Value Ref Range    Glucose 124 (H) 65 - 99 mg/dL    BUN 9 6 - 20 mg/dL    Creatinine 0.64 0.57 - 1.00 mg/dL    Sodium 135 (L) 136 - 145 mmol/L    Potassium 3.8 3.5 - 5.2 mmol/L    Chloride 103 98 - 107 mmol/L    CO2 19.6 (L) 22.0 - 29.0 mmol/L    Calcium 9.0 8.6 - 10.5 mg/dL    Total Protein 6.9 6.0 - 8.5 g/dL    Albumin 4.2 3.5 - 5.2 g/dL    ALT (SGPT) 8 1 - 33 U/L    AST (SGOT) 13 1 - 32 U/L    Alkaline Phosphatase 64 39 - 117 U/L    Total Bilirubin 0.8 0.0 - 1.2 mg/dL    Globulin 2.7 gm/dL    A/G Ratio 1.6 g/dL    BUN/Creatinine Ratio 14.1 7.0 - 25.0    Anion Gap 12.4 5.0 - 15.0 mmol/L    eGFR 110.5 >60.0 mL/min/1.73   Magnesium    Collection Time: 01/21/25  6:59 PM    Specimen: Blood   Result Value Ref Range    Magnesium 2.0 1.6 - 2.6 mg/dL   CBC Auto Differential    Collection Time: 01/21/25  6:59 PM    Specimen: Blood   Result Value Ref Range    WBC 3.59 3.40 - 10.80 10*3/mm3    RBC 2.41 (L) 3.77 - 5.28 10*6/mm3    Hemoglobin 4.0 (C) 12.0 - 15.9 g/dL    Hematocrit 16.3 (C) 34.0 - 46.6 %    MCV 67.6 (L) 79.0 - 97.0 fL    MCH 16.6 (L) 26.6 - 33.0 pg    MCHC 24.5 (L) 31.5 - 35.7 g/dL    RDW 18.2 (H) 12.3 - 15.4 %    RDW-SD 44.2 37.0 - 54.0 fl    MPV 9.9 6.0 - 12.0 fL    Platelets 219 140 - 450 10*3/mm3    Neutrophil % 69.1 42.7 - 76.0 %    Lymphocyte % 20.1 19.6 - 45.3 %    Monocyte % 8.9 5.0 - 12.0 %    Eosinophil % 0.8 0.3 - 6.2 %    Basophil % 0.8 0.0 - 1.5 %    Immature Grans % 0.3 0.0 - 0.5 %    Neutrophils, Absolute 2.48 1.70 - 7.00 10*3/mm3     Lymphocytes, Absolute 0.72 0.70 - 3.10 10*3/mm3    Monocytes, Absolute 0.32 0.10 - 0.90 10*3/mm3    Eosinophils, Absolute 0.03 0.00 - 0.40 10*3/mm3    Basophils, Absolute 0.03 0.00 - 0.20 10*3/mm3    Immature Grans, Absolute 0.01 0.00 - 0.05 10*3/mm3    nRBC 0.6 (H) 0.0 - 0.2 /100 WBC       If labs were ordered, I independently reviewed the results and considered them in treating the patient.        RADIOLOGY  No Radiology Exams Resulted Within Past 24 Hours        PROCEDURES    Critical Care    Performed by: Tom Walsh MD  Authorized by: Tra Murphy MD    Critical care provider statement:     Critical care time (minutes):  31    Critical care time was exclusive of:  Separately billable procedures and treating other patients    Critical care was necessary to treat or prevent imminent or life-threatening deterioration of the following conditions: blood loss anemia.    Critical care was time spent personally by me on the following activities:  Ordering and performing treatments and interventions, ordering and review of laboratory studies, ordering and review of radiographic studies, pulse oximetry, blood draw for specimens, discussions with consultants, discussions with primary provider and evaluation of patient's response to treatment    I assumed direction of critical care for this patient from another provider in my specialty: no      Care discussed with: admitting provider        Interpretations    O2 Sat: The patients oxygen saturation was 100% on Room Air.  This was independently interpreted by me as Normal        MEDICATIONS GIVEN IN ER    Medications   sodium chloride 0.9 % flush 10 mL (has no administration in time range)         MEDICAL DECISION MAKING, PROGRESS, and CONSULTS    All labs, if obtained, have been independently reviewed by me.  All radiology studies, if obtained, have been reviewed by me and the radiologist dictating the report.  All EKG's, if obtained, have been  independently viewed and interpreted by me      Discussion below represents my analysis of pertinent findings related to patient's condition, differential diagnosis, treatment plan and final disposition.      Differential diagnosis:    44-year-old female with known outpatient hemoglobin of 4 presenting the ED with need for blood transfusion.  Will recheck the patient's hemoglobin, order type and screen, 3 units of blood transfusion, and contact the patient's primary care physician.  She will likely need admission given the severity of her anemia.  She has known menorrhagia, this is already being managed by GYN.  She is not actively bleeding currently.  She had multiple studies regarding I do not think she needs recurrent imaging today.    Additional Sources:  Discussed/ obtained information from independent historians:  Daughter at bedside  External (non-ED) record review:  OB/GYN note 1/15/2025 regarding met mental menorrhagia       Orders placed during this visit:  Orders Placed This Encounter   Procedures    Comprehensive Metabolic Panel    Protime-INR    aPTT    Magnesium    CBC Auto Differential    Scan Slide    Verify Informed Consent for Blood Product Administration    Type & Screen    Prepare RBC, 3 Units    Insert Peripheral IV    Initiate Observation Status    CBC & Differential         Additional orders considered but not ordered:  None    ED Course:    Consultants:  Hospitalist    ED Course as of 01/21/25 1935 Tue Jan 21, 2025 1921 CBC & Differential(!!)  Hemoglobin is 4, confirming the outpatient labs, type and screen and blood transfusion as previously been ordered [CS]      ED Course User Index  [CS] Tom Walsh MD           After my consideration of clinical presentation and any laboratory/radiology studies obtained, I discussed the findings with the patient/patient representative who is in agreement with the treatment plan and the final disposition. Risks and benefits of admission  was discussed     AS OF 19:35 EST VITALS:    BP - 129/73  HR - 105  TEMP - 97.8 °F (36.6 °C) (Oral)  O2 SATS - 100%    I reviewed the patients prescription monitoring report if available prior to discharge    DIAGNOSIS  Final diagnoses:   Acute blood loss anemia   Menorrhagia with irregular cycle         DISPOSITION  ED Disposition       ED Disposition   Decision to Admit    Condition   --    Comment   Level of Care: Telemetry [5]   Diagnosis: Acute blood loss anemia [364267]   Admitting Physician: LOKESH HEDRICK [[id number]]   Attending Physician: LOKESH HEDRICK [[id number]]                     Please note that portions of this document were completed with voice recognition software.        Tom Walsh MD  01/21/25 1942      Electronically signed by Tom Walsh MD at 01/21/25 1942       Vital Signs (last day)       Date/Time Temp Temp src Pulse Resp BP Patient Position SpO2    01/22/25 0706 99.2 (37.3) Oral -- 18 134/93 Lying --    01/22/25 0515 98.3 (36.8) Oral 83 18 129/93 -- 100    01/22/25 0500 -- -- 87 -- 127/89 -- 100    01/22/25 0458 98.1 (36.7) Oral 89 18 127/89 -- 100    01/22/25 0410 98.3 (36.8) Oral -- 18 125/93 Lying --    01/22/25 0205 98.2 (36.8) Oral -- 18 125/89 -- --    01/22/25 0130 97.8 (36.6) Oral -- 18 127/94 Lying --    01/21/25 2342 98.1 (36.7) Oral 90 18 123/90 Lying 100    01/21/25 2310 98.2 (36.8) Oral 90 16 131/95 Lying 100    01/21/25 2300 -- -- 96 -- 122/96 -- 100    01/21/25 2230 -- -- 100 -- 117/82 -- 100    01/21/25 2200 -- -- 106 -- 121/86 -- 100    01/21/25 2130 -- -- 104 -- 120/88 -- 100    01/21/25 2050 98.4 (36.9) Oral 106 18 -- Sitting 100    01/21/25 2005 98.1 (36.7) Oral 104 16 126/88 Lying 100    01/21/25 1930 -- -- 105 -- 117/77 -- --    01/21/25 1852 -- -- 105 -- -- -- 100    01/21/25 1830 97.8 (36.6) Oral -- 16 129/73 Sitting --          Current Facility-Administered Medications   Medication Dose Route Frequency Provider Last Rate Last Admin     pantoprazole (PROTONIX) EC tablet 40 mg  40 mg Oral Q AM Tra Murphy MD   40 mg at 01/22/25 0824    sodium chloride 0.9 % flush 10 mL  10 mL Intravenous PRN Tom Walsh MD         Lab Results (last 24 hours)       Procedure Component Value Units Date/Time    CBC & Differential [[id number]]  (Abnormal) Collected: 01/21/25 1859    Specimen: Blood Updated: 01/21/25 1947    Narrative:      The following orders were created for panel order CBC & Differential.  Procedure                               Abnormality         Status                     ---------                               -----------         ------                     CBC Auto Differential[[id number]]        Abnormal            Final result               Scan Slide[[id number]]                                       Final result                 Please view results for these tests on the individual orders.    Scan Slide [[id number]] Collected: 01/21/25 1859    Specimen: Blood Updated: 01/21/25 1947     Hypochromia Mod/2+     Microcytes Large/3+     WBC Morphology Normal     Platelet Estimate Adequate    Protime-INR [[id number]]  (Normal) Collected: 01/21/25 1859    Specimen: Blood Updated: 01/21/25 1946     Protime 14.2 Seconds      INR 1.05    Narrative:      Suggested INR therapeutic range for stable oral anticoagulant therapy:    Low Intensity therapy:   1.5-2.0  Moderate Intensity therapy:   2.0-3.0  High Intensity therapy:   2.5-4.0    aPTT [[id number]]  (Abnormal) Collected: 01/21/25 1859    Specimen: Blood Updated: 01/21/25 1946     PTT 21.5 seconds     Comprehensive Metabolic Panel [[id number]]  (Abnormal) Collected: 01/21/25 1859    Specimen: Blood Updated: 01/21/25 1929     Glucose 124 mg/dL      BUN 9 mg/dL      Creatinine 0.64 mg/dL      Sodium 135 mmol/L      Potassium 3.8 mmol/L      Chloride 103 mmol/L      CO2 19.6 mmol/L      Calcium 9.0 mg/dL      Total Protein 6.9 g/dL      Albumin 4.2 g/dL      ALT (SGPT) 8 U/L      AST (SGOT) 13  U/L      Alkaline Phosphatase 64 U/L      Total Bilirubin 0.8 mg/dL      Globulin 2.7 gm/dL      A/G Ratio 1.6 g/dL      BUN/Creatinine Ratio 14.1     Anion Gap 12.4 mmol/L      eGFR 110.5 mL/min/1.73     Narrative:      GFR Categories in Chronic Kidney Disease (CKD)      GFR Category          GFR (mL/min/1.73)    Interpretation  G1                     90 or greater         Normal or high (1)  G2                      60-89                Mild decrease (1)  G3a                   45-59                Mild to moderate decrease  G3b                   30-44                Moderate to severe decrease  G4                    15-29                Severe decrease  G5                    14 or less           Kidney failure          (1)In the absence of evidence of kidney disease, neither GFR category G1 or G2 fulfill the criteria for CKD.    eGFR calculation 2021 CKD-EPI creatinine equation, which does not include race as a factor    Magnesium [[id number]]  (Normal) Collected: 01/21/25 1859    Specimen: Blood Updated: 01/21/25 1929     Magnesium 2.0 mg/dL     CBC Auto Differential [[id number]]  (Abnormal) Collected: 01/21/25 1859    Specimen: Blood Updated: 01/21/25 1920     WBC 3.59 10*3/mm3      RBC 2.41 10*6/mm3      Hemoglobin 4.0 g/dL      Hematocrit 16.3 %      MCV 67.6 fL      MCH 16.6 pg      MCHC 24.5 g/dL      RDW 18.2 %      RDW-SD 44.2 fl      MPV 9.9 fL      Platelets 219 10*3/mm3      Neutrophil % 69.1 %      Lymphocyte % 20.1 %      Monocyte % 8.9 %      Eosinophil % 0.8 %      Basophil % 0.8 %      Immature Grans % 0.3 %      Neutrophils, Absolute 2.48 10*3/mm3      Lymphocytes, Absolute 0.72 10*3/mm3      Monocytes, Absolute 0.32 10*3/mm3      Eosinophils, Absolute 0.03 10*3/mm3      Basophils, Absolute 0.03 10*3/mm3      Immature Grans, Absolute 0.01 10*3/mm3      nRBC 0.6 /100 WBC           Imaging Results (Last 24 Hours)       ** No results found for the last 24 hours. **          Physician Progress Notes  (last 24 hours)  Notes from 01/21/25 0946 through 01/22/25 0946   No notes of this type exist for this encounter.       Consult Notes (last 24 hours)  Notes from 01/21/25 0946 through 01/22/25 0946   No notes of this type exist for this encounter.

## 2025-01-22 NOTE — PLAN OF CARE
Problem: Adult Inpatient Plan of Care  Goal: Plan of Care Review  Outcome: Progressing  Goal: Patient-Specific Goal (Individualized)  Outcome: Progressing  Goal: Absence of Hospital-Acquired Illness or Injury  Outcome: Progressing  Intervention: Identify and Manage Fall Risk  Recent Flowsheet Documentation  Taken 1/22/2025 0205 by Marcy Macias RN  Safety Promotion/Fall Prevention:   safety round/check completed   nonskid shoes/slippers when out of bed   clutter free environment maintained   assistive device/personal items within reach  Taken 1/22/2025 0000 by Marcy Macias RN  Safety Promotion/Fall Prevention:   safety round/check completed   nonskid shoes/slippers when out of bed   clutter free environment maintained   assistive device/personal items within reach  Taken 1/21/2025 2200 by Marcy Macias RN  Safety Promotion/Fall Prevention:   safety round/check completed   nonskid shoes/slippers when out of bed   clutter free environment maintained   assistive device/personal items within reach  Taken 1/21/2025 2000 by Marcy Macias RN  Safety Promotion/Fall Prevention:   safety round/check completed   nonskid shoes/slippers when out of bed   clutter free environment maintained   assistive device/personal items within reach  Intervention: Prevent Skin Injury  Recent Flowsheet Documentation  Taken 1/22/2025 0205 by Marcy Macias RN  Body Position:   supine   legs elevated  Taken 1/22/2025 0000 by Marcy Macias RN  Body Position:   tilted   right   legs elevated   position changed independently  Taken 1/21/2025 2200 by Marcy Macias RN  Body Position:   position changed independently   supine   legs elevated  Taken 1/21/2025 2000 by Marcy Macias RN  Body Position:   sitting up in bed   legs elevated   position changed independently  Skin Protection: incontinence pads utilized  Intervention: Prevent and Manage VTE (Venous Thromboembolism) Risk  Recent Flowsheet  Documentation  Taken 1/21/2025 2000 by Marcy Macias RN  VTE Prevention/Management:   bilateral   SCDs (sequential compression devices) off  Intervention: Prevent Infection  Recent Flowsheet Documentation  Taken 1/22/2025 0205 by Marcy Macias RN  Infection Prevention:   single patient room provided   rest/sleep promoted   environmental surveillance performed  Taken 1/22/2025 0000 by Marcy Macias RN  Infection Prevention:   single patient room provided   rest/sleep promoted   environmental surveillance performed  Taken 1/21/2025 2200 by Marcy Macias RN  Infection Prevention:   single patient room provided   rest/sleep promoted   environmental surveillance performed  Taken 1/21/2025 2000 by Marcy Macias RN  Infection Prevention:   environmental surveillance performed   single patient room provided   rest/sleep promoted  Goal: Optimal Comfort and Wellbeing  Outcome: Progressing  Intervention: Provide Person-Centered Care  Recent Flowsheet Documentation  Taken 1/21/2025 2000 by Marcy Macias RN  Trust Relationship/Rapport:   care explained   questions encouraged   thoughts/feelings acknowledged  Goal: Readiness for Transition of Care  Outcome: Progressing     Problem: Anemia  Goal: Anemia Symptom Improvement  Outcome: Progressing  Intervention: Monitor and Manage Anemia  Recent Flowsheet Documentation  Taken 1/22/2025 0205 by Marcy Macias RN  Safety Promotion/Fall Prevention:   safety round/check completed   nonskid shoes/slippers when out of bed   clutter free environment maintained   assistive device/personal items within reach  Taken 1/22/2025 0000 by Marcy Macias RN  Safety Promotion/Fall Prevention:   safety round/check completed   nonskid shoes/slippers when out of bed   clutter free environment maintained   assistive device/personal items within reach  Taken 1/21/2025 2200 by Marcy Macias RN  Safety Promotion/Fall Prevention:   safety round/check  completed   nonskid shoes/slippers when out of bed   clutter free environment maintained   assistive device/personal items within reach  Taken 1/21/2025 2000 by Marcy Macias RN  Safety Promotion/Fall Prevention:   safety round/check completed   nonskid shoes/slippers when out of bed   clutter free environment maintained   assistive device/personal items within reach   Goal Outcome Evaluation:         New ED admit.  VSS on RA.  Patient's hemoglobin was 4.0.  Patient received 4 units of PRBC's and tolerated well.  No further complaints from patient at this time.  POC ongoing.

## 2025-01-22 NOTE — CASE MANAGEMENT/SOCIAL WORK
Discharge Planning Assessment  [facility]     Patient Name: Lana Beasley  MRN: [id number]  Today's Date: 1/22/2025    Admit Date: 1/21/2025    Plan: Met with pt and her daughter, Laverne, for dcp. Demographics verified. Pt has no home DME, POA, AD, or financial hardship/ She lives with her  and 2 daughters, has been at current address 1 yr. Dr. Murphy is her primary and [facility] is preferred pharmacy. She has no dc concerns, plans to return home today with family.   Discharge Needs Assessment       Row Name 01/22/25 1054       Living Environment    People in Home child(monet), adult;spouse    Current Living Arrangements home    Duration at Residence 1 yr    Potentially Unsafe Housing Conditions none    In the past 12 months has the electric, gas, oil, or water company threatened to shut off services in your home? No    Primary Care Provided by self    Family Caregiver if Needed spouse;child(monet), adult    Quality of Family Relationships involved;helpful    Able to Return to Prior Arrangements yes       Resource/Environmental Concerns    Resource/Environmental Concerns none    Transportation Concerns none       Transportation Needs    In the past 12 months, has lack of transportation kept you from medical appointments or from getting medications? no    In the past 12 months, has lack of transportation kept you from meetings, work, or from getting things needed for daily living? No       Food Insecurity    Within the past 12 months, you worried that your food would run out before you got the money to buy more. Never true    Within the past 12 months, the food you bought just didn't last and you didn't have money to get more. Never true       Transition Planning    Patient/Family Anticipates Transition to home with family       Discharge Needs Assessment    Readmission Within the Last 30 Days no previous admission in last 30 days    Do you want help finding or keeping work or a job? I do not need or want  help    Do you want help with school or training? For example, starting or completing job training or getting a high school diploma, GED or equivalent No    Anticipated Changes Related to Illness none    Equipment Needed After Discharge none                   Discharge Plan       Row Name 01/22/25 1057       Plan    Plan Met with pt and her daughter, Laverne, for dcp. Demographics verified. Pt has no home DME, POA, AD, or financial hardship/ She lives with her  and 2 daughters, has been at current address 1 yr. Dr. Murphy is her primary and [facility] is preferred pharmacy. She has no dc concerns, plans to return home today with family.                  Continued Care and Services - Admitted Since 1/21/2025    No active coordination exists for this encounter.       Expected Discharge Date and Time       Expected Discharge Date Expected Discharge Time    Jan 22, 2025            Demographic Summary       Row Name 01/22/25 1052       General Information    Admission Type observation    Arrived From physician office - internal    Referral Source admission list    Reason for Consult discharge planning    Preferred Language English       Contact Information    Permission Granted to Share Info With family/designee                   Functional Status       Row Name 01/22/25 1053       Functional Status    Usual Activity Tolerance excellent    Current Activity Tolerance moderate       Physical Activity    On average, how many days per week do you engage in moderate to strenuous exercise (like a brisk walk)? 0 days    On average, how many minutes do you engage in exercise at this level? 0 min    Number of minutes of exercise per week 0       Functional Status, IADL    Medications independent    Meal Preparation independent    Housekeeping independent    Laundry independent    Shopping independent    If for any reason you need help with day-to-day activities such as bathing, preparing meals, shopping, managing finances,  etc., do you get the help you need? I don't need any help       Mental Status    General Appearance WDL WDL       Mental Status Summary    Recent Changes in Mental Status/Cognitive Functioning no changes       Employment/    Employment Status unemployed                   Psychosocial    No documentation.                  Abuse/Neglect    No documentation.                  Legal    No documentation.                  Substance Abuse    No documentation.                  Patient Forms    No documentation.                     Angela Galarza RN

## 2025-01-22 NOTE — CONSULTS
GYNECOLOGY CONSULT NOTE    CHIEF COMPLAINT: Critical anemia    DIAGNOSIS:  Abnormal uterine bleeding  Acute on chronic blood loss anemia requiring blood transfusion  Uterine fibroids  Thickened endometrial lining on ultrasound (1/15)    ASSESSMENT/PLAN     44 y.o.  female who presents with critical labia following prolonged and heavy vaginal bleeding.    We discussed her situation in detail today.  She feels much better after receiving 4 units of blood.  Her hemoglobin improved from 4.0 to 9.7.  We discussed her ultrasound findings from 1/15.  She does desire to proceed with surgical intervention in the outpatient setting.  I recommend that we start Provera 10 mg daily until a procedure can be performed.  Our office will set up a follow-up visit in the near future.  Call/return precautions reviewed.    HISTORY OF PRESENT ILLNESS     44 y.o.  female who presents with critical labia following prolonged and heavy vaginal bleeding.    She reports heavy vaginal bleeding over the last several days.  She then began to feel lightheaded, weak and dizzy.  Hemoglobin was drawn in clinic and found to be 4.0.  She was sent to the emergency room for admission and blood transfusion.    REVIEW OF SYSTEMS: A complete review of systems was performed and was specifically negative for headache, changes in vision, RUQ pain, shortness of breath, chest pain, lower extremity edema and dysuria.     HISTORY:  Obstetrical History:  OB History    Para Term  AB Living   3 3       3   SAB IAB Ectopic Molar Multiple Live Births             3      # Outcome Date GA Lbr Villa/2nd Weight Sex Type Anes PTL Lv   3 Para            2 Para            1 Para                Past Medical History:  Past Medical History:   Diagnosis Date    Anemia     Body piercing     both ears    COVID-19 vaccine series completed     rosaline    GERD (gastroesophageal reflux disease)     History of blood transfusion     no reaction    Iron deficiency      Wears glasses        Past Surgical History:  Past Surgical History:   Procedure Laterality Date    COLONOSCOPY N/A 3/5/2021    Procedure: COLONOSCOPY WITH BIOPSY;  Surgeon: Jay Sharma MD;  Location: [facility] ENDOSCOPY;  Service: Gastroenterology;  Laterality: N/A;    D & C HYSTEROSCOPY N/A 8/25/2021    Procedure: DILATATION AND CURETTAGE HYSTEROSCOPY;  Surgeon: Andrew Hackett MD;  Location: [facility] OR;  Service: Obstetrics/Gynecology;  Laterality: N/A;    DILATATION AND CURETTAGE      ENDOSCOPY N/A 12/11/2020    Procedure: ESOPHAGOGASTRODUODENOSCOPY WITH BIOPSY;  Surgeon: Jay Sharma MD;  Location: [facility] ENDOSCOPY;  Service: Gastroenterology;  Laterality: N/A;    HYSTEROSCOPY         Social History:  Social History     Tobacco Use    Smoking status: Never    Smokeless tobacco: Never   Vaping Use    Vaping status: Never Used   Substance Use Topics    Alcohol use: Never    Drug use: Never       Family History  Family History   Problem Relation Age of Onset    No Known Problems Father     No Known Problems Mother     Colon cancer Neg Hx     Cirrhosis Neg Hx     Liver cancer Neg Hx     Liver disease Neg Hx         Allergies: No Known Allergies    No current facility-administered medications on file prior to encounter.     Current Outpatient Medications on File Prior to Encounter   Medication Sig Dispense Refill    omeprazole (priLOSEC) 20 MG capsule Take 1 capsule by mouth Daily. 30 capsule 5         OBJECTIVE   VITALS:  Temp:  [97.8 °F (36.6 °C)-99.2 °F (37.3 °C)] 99.2 °F (37.3 °C)  Heart Rate:  [] 83  Resp:  [16-18] 18  BP: (117-134)/(73-96) 134/93    PHYSICAL EXAM:  General Appearance: alert, pleasant, appears stated age, interactive and cooperative      DIAGNOSTIC STUDIES:  Results from last 7 days   Lab Units 01/22/25  1033 01/21/25  1859   WBC 10*3/mm3 4.91 3.59   HEMOGLOBIN g/dL 9.7* 4.0*   HEMATOCRIT % 30.4* 16.3*   PLATELETS 10*3/mm3 163 219   SODIUM mmol/L 138 135*    POTASSIUM mmol/L 3.6 3.8   CHLORIDE mmol/L 106 103   CO2 mmol/L 18.5* 19.6*   BUN mg/dL 6 9   CREATININE mg/dL 0.61 0.64   GLUCOSE mg/dL 129* 124*   CALCIUM mg/dL 8.9 9.0   AST (SGOT) U/L 23 13   ALT (SGPT) U/L 15 8   MAGNESIUM mg/dL  --  2.0       Recent Results (from the past 24 hours)   Comprehensive Metabolic Panel    Collection Time: 01/21/25  6:59 PM    Specimen: Blood   Result Value Ref Range    Glucose 124 (H) 65 - 99 mg/dL    BUN 9 6 - 20 mg/dL    Creatinine 0.64 0.57 - 1.00 mg/dL    Sodium 135 (L) 136 - 145 mmol/L    Potassium 3.8 3.5 - 5.2 mmol/L    Chloride 103 98 - 107 mmol/L    CO2 19.6 (L) 22.0 - 29.0 mmol/L    Calcium 9.0 8.6 - 10.5 mg/dL    Total Protein 6.9 6.0 - 8.5 g/dL    Albumin 4.2 3.5 - 5.2 g/dL    ALT (SGPT) 8 1 - 33 U/L    AST (SGOT) 13 1 - 32 U/L    Alkaline Phosphatase 64 39 - 117 U/L    Total Bilirubin 0.8 0.0 - 1.2 mg/dL    Globulin 2.7 gm/dL    A/G Ratio 1.6 g/dL    BUN/Creatinine Ratio 14.1 7.0 - 25.0    Anion Gap 12.4 5.0 - 15.0 mmol/L    eGFR 110.5 >60.0 mL/min/1.73   Protime-INR    Collection Time: 01/21/25  6:59 PM    Specimen: Blood   Result Value Ref Range    Protime 14.2 12.3 - 15.1 Seconds    INR 1.05 0.90 - 1.10   aPTT    Collection Time: 01/21/25  6:59 PM    Specimen: Blood   Result Value Ref Range    PTT 21.5 (L) 70.0 - 100.0 seconds   Type & Screen    Collection Time: 01/21/25  6:59 PM    Specimen: Blood   Result Value Ref Range    ABO Type B     RH type Positive     Antibody Screen Negative     T&S Expiration Date 1/24/2025 11:59:59 PM    Magnesium    Collection Time: 01/21/25  6:59 PM    Specimen: Blood   Result Value Ref Range    Magnesium 2.0 1.6 - 2.6 mg/dL   CBC Auto Differential    Collection Time: 01/21/25  6:59 PM    Specimen: Blood   Result Value Ref Range    WBC 3.59 3.40 - 10.80 10*3/mm3    RBC 2.41 (L) 3.77 - 5.28 10*6/mm3    Hemoglobin 4.0 (C) 12.0 - 15.9 g/dL    Hematocrit 16.3 (C) 34.0 - 46.6 %    MCV 67.6 (L) 79.0 - 97.0 fL    MCH 16.6 (L) 26.6 - 33.0 pg     MCHC 24.5 (L) 31.5 - 35.7 g/dL    RDW 18.2 (H) 12.3 - 15.4 %    RDW-SD 44.2 37.0 - 54.0 fl    MPV 9.9 6.0 - 12.0 fL    Platelets 219 140 - 450 10*3/mm3    Neutrophil % 69.1 42.7 - 76.0 %    Lymphocyte % 20.1 19.6 - 45.3 %    Monocyte % 8.9 5.0 - 12.0 %    Eosinophil % 0.8 0.3 - 6.2 %    Basophil % 0.8 0.0 - 1.5 %    Immature Grans % 0.3 0.0 - 0.5 %    Neutrophils, Absolute 2.48 1.70 - 7.00 10*3/mm3    Lymphocytes, Absolute 0.72 0.70 - 3.10 10*3/mm3    Monocytes, Absolute 0.32 0.10 - 0.90 10*3/mm3    Eosinophils, Absolute 0.03 0.00 - 0.40 10*3/mm3    Basophils, Absolute 0.03 0.00 - 0.20 10*3/mm3    Immature Grans, Absolute 0.01 0.00 - 0.05 10*3/mm3    nRBC 0.6 (H) 0.0 - 0.2 /100 WBC   Scan Slide    Collection Time: 01/21/25  6:59 PM    Specimen: Blood   Result Value Ref Range    Hypochromia Mod/2+ None Seen    Microcytes Large/3+ None Seen    WBC Morphology Normal Normal    Platelet Estimate Adequate Normal   Prepare RBC, 3 Units    Collection Time: 01/22/25  1:42 AM   Result Value Ref Range    Product Code M2027T78     Unit Number [id number]     UNIT  ABO B     UNIT  RH POS     Crossmatch Interpretation Compatible     Dispense Status IS     Blood Expiration Date 202502202359     Blood Type Barcode 7300     Product Code C1277Y67     Unit Number [id number]     UNIT  ABO B     UNIT  RH POS     Crossmatch Interpretation Compatible     Dispense Status IS     Blood Expiration Date 202502102359     Blood Type Barcode 7300     Product Code S8057S15     Unit Number [id number]     UNIT  ABO B     UNIT  RH POS     Crossmatch Interpretation Compatible     Dispense Status IS     Blood Expiration Date 921182389338     Blood Type Barcode 7300    Prepare RBC, 1 Units    Collection Time: 01/22/25  4:55 AM   Result Value Ref Range    Product Code F0715Z55     Unit Number [id number]     UNIT  ABO B     UNIT  RH POS     Crossmatch Interpretation Compatible     Dispense Status IS     Blood Expiration Date  104699946958     Blood Type Barcode 7300        Uriel Jacques MD  Obstetrics and Gynecology  [facility]

## 2025-01-22 NOTE — ED PROVIDER NOTES
EMERGENCY DEPARTMENT ENCOUNTER    Pt Name: Lana Beasley  MRN: [id number]  Pt :   1980  Room Number:    Date of encounter:  2025  PCP: Tra Murphy MD  ED Provider: Tom Walsh MD    Historian: Patient, Family, and as       HPI:  Chief Complaint   Patient presents with    Abnormal Lab          Context: Lana Beasley is a 44 y.o. female who presents to the ED c/o anemia, the patient has a history of menorrhagia, as well as anemia, she has had iron deficiency and blood transfusions in the past.  She follow-up with her primary care physician recently and had a blood draw which showed a hemoglobin of 4.  Called and advised to come to the ER, she is following up with OB/GYN with discussion of possible treatments for her menorrhagia.  She reports some weakness, and shortness of breath.  No active bleeding currently.      PAST MEDICAL HISTORY  Past Medical History:   Diagnosis Date    Anemia     Body piercing     both ears    COVID-19 vaccine series completed     phizer    GERD (gastroesophageal reflux disease)     History of blood transfusion     no reaction    Iron deficiency     Wears glasses          PAST SURGICAL HISTORY  Past Surgical History:   Procedure Laterality Date    COLONOSCOPY N/A 3/5/2021    Procedure: COLONOSCOPY WITH BIOPSY;  Surgeon: Jay Sharma MD;  Location: [facility] ENDOSCOPY;  Service: Gastroenterology;  Laterality: N/A;    D & C HYSTEROSCOPY N/A 2021    Procedure: DILATATION AND CURETTAGE HYSTEROSCOPY;  Surgeon: Andrew Hackett MD;  Location: [facility] OR;  Service: Obstetrics/Gynecology;  Laterality: N/A;    DILATATION AND CURETTAGE      ENDOSCOPY N/A 2020    Procedure: ESOPHAGOGASTRODUODENOSCOPY WITH BIOPSY;  Surgeon: Jay Sharma MD;  Location: [facility] ENDOSCOPY;  Service: Gastroenterology;  Laterality: N/A;    HYSTEROSCOPY           FAMILY HISTORY  Family History   Problem Relation Age of Onset    No  Known Problems Father     No Known Problems Mother     Colon cancer Neg Hx     Cirrhosis Neg Hx     Liver cancer Neg Hx     Liver disease Neg Hx          SOCIAL HISTORY  Social History     Socioeconomic History    Marital status:    Tobacco Use    Smoking status: Never    Smokeless tobacco: Never   Vaping Use    Vaping status: Never Used   Substance and Sexual Activity    Alcohol use: Never    Drug use: Never    Sexual activity: Not Currently     Birth control/protection: None         ALLERGIES  Patient has no known allergies.        REVIEW OF SYSTEMS  Review of Systems   Constitutional:  Negative for chills and fever.   HENT:  Negative for sore throat and trouble swallowing.    Eyes:  Negative for pain and redness.   Respiratory:  Positive for shortness of breath. Negative for cough.    Cardiovascular:  Negative for chest pain and leg swelling.   Gastrointestinal:  Negative for abdominal pain, nausea and vomiting.   Genitourinary:  Negative for dysuria and urgency.   Musculoskeletal:  Negative for back pain and neck pain.   Skin:  Negative for rash and wound.   Neurological:  Positive for weakness. Negative for dizziness.        All systems reviewed and negative except for those discussed in HPI.       PHYSICAL EXAM    I have reviewed the triage vital signs and nursing notes.    ED Triage Vitals   Temp Heart Rate Resp BP SpO2   01/21/25 1830 01/21/25 1852 01/21/25 1830 01/21/25 1830 01/21/25 1852   97.8 °F (36.6 °C) 105 16 129/73 100 %      Temp src Heart Rate Source Patient Position BP Location FiO2 (%)   01/21/25 1830 01/21/25 1852 01/21/25 1830 01/21/25 1830 --   Oral Monitor Sitting Left arm        Physical Exam  Constitutional:       Appearance: Normal appearance. She is not ill-appearing.   HENT:      Head: Normocephalic and atraumatic.      Right Ear: External ear normal.      Left Ear: External ear normal.      Nose: Nose normal.      Mouth/Throat:      Mouth: Mucous membranes are moist.       Pharynx: Oropharynx is clear.   Eyes:      Extraocular Movements: Extraocular movements intact.      Conjunctiva/sclera: Conjunctivae normal.      Pupils: Pupils are equal, round, and reactive to light.      Comments: Conjunctival pallor   Cardiovascular:      Rate and Rhythm: Normal rate and regular rhythm.      Pulses:           Radial pulses are 2+ on the right side and 2+ on the left side.   Pulmonary:      Effort: Pulmonary effort is normal.      Breath sounds: Normal breath sounds.   Abdominal:      General: There is no distension.      Palpations: Abdomen is soft.      Tenderness: There is no abdominal tenderness.   Musculoskeletal:         General: No tenderness or deformity. Normal range of motion.      Cervical back: Normal range of motion and neck supple.      Right lower leg: No edema.      Left lower leg: No edema.   Skin:     General: Skin is warm and dry.      Capillary Refill: Capillary refill takes less than 2 seconds.      Coloration: Skin is pale.   Neurological:      General: No focal deficit present.      Mental Status: She is alert and oriented to person, place, and time.            LAB RESULTS  Recent Results (from the past 24 hours)   Comprehensive Metabolic Panel    Collection Time: 01/21/25  6:59 PM    Specimen: Blood   Result Value Ref Range    Glucose 124 (H) 65 - 99 mg/dL    BUN 9 6 - 20 mg/dL    Creatinine 0.64 0.57 - 1.00 mg/dL    Sodium 135 (L) 136 - 145 mmol/L    Potassium 3.8 3.5 - 5.2 mmol/L    Chloride 103 98 - 107 mmol/L    CO2 19.6 (L) 22.0 - 29.0 mmol/L    Calcium 9.0 8.6 - 10.5 mg/dL    Total Protein 6.9 6.0 - 8.5 g/dL    Albumin 4.2 3.5 - 5.2 g/dL    ALT (SGPT) 8 1 - 33 U/L    AST (SGOT) 13 1 - 32 U/L    Alkaline Phosphatase 64 39 - 117 U/L    Total Bilirubin 0.8 0.0 - 1.2 mg/dL    Globulin 2.7 gm/dL    A/G Ratio 1.6 g/dL    BUN/Creatinine Ratio 14.1 7.0 - 25.0    Anion Gap 12.4 5.0 - 15.0 mmol/L    eGFR 110.5 >60.0 mL/min/1.73   Magnesium    Collection Time: 01/21/25  6:59  PM    Specimen: Blood   Result Value Ref Range    Magnesium 2.0 1.6 - 2.6 mg/dL   CBC Auto Differential    Collection Time: 01/21/25  6:59 PM    Specimen: Blood   Result Value Ref Range    WBC 3.59 3.40 - 10.80 10*3/mm3    RBC 2.41 (L) 3.77 - 5.28 10*6/mm3    Hemoglobin 4.0 (C) 12.0 - 15.9 g/dL    Hematocrit 16.3 (C) 34.0 - 46.6 %    MCV 67.6 (L) 79.0 - 97.0 fL    MCH 16.6 (L) 26.6 - 33.0 pg    MCHC 24.5 (L) 31.5 - 35.7 g/dL    RDW 18.2 (H) 12.3 - 15.4 %    RDW-SD 44.2 37.0 - 54.0 fl    MPV 9.9 6.0 - 12.0 fL    Platelets 219 140 - 450 10*3/mm3    Neutrophil % 69.1 42.7 - 76.0 %    Lymphocyte % 20.1 19.6 - 45.3 %    Monocyte % 8.9 5.0 - 12.0 %    Eosinophil % 0.8 0.3 - 6.2 %    Basophil % 0.8 0.0 - 1.5 %    Immature Grans % 0.3 0.0 - 0.5 %    Neutrophils, Absolute 2.48 1.70 - 7.00 10*3/mm3    Lymphocytes, Absolute 0.72 0.70 - 3.10 10*3/mm3    Monocytes, Absolute 0.32 0.10 - 0.90 10*3/mm3    Eosinophils, Absolute 0.03 0.00 - 0.40 10*3/mm3    Basophils, Absolute 0.03 0.00 - 0.20 10*3/mm3    Immature Grans, Absolute 0.01 0.00 - 0.05 10*3/mm3    nRBC 0.6 (H) 0.0 - 0.2 /100 WBC       If labs were ordered, I independently reviewed the results and considered them in treating the patient.        RADIOLOGY  No Radiology Exams Resulted Within Past 24 Hours        PROCEDURES    Critical Care    Performed by: Tom Walsh MD  Authorized by: Tra Murphy MD    Critical care provider statement:     Critical care time (minutes):  31    Critical care time was exclusive of:  Separately billable procedures and treating other patients    Critical care was necessary to treat or prevent imminent or life-threatening deterioration of the following conditions: blood loss anemia.    Critical care was time spent personally by me on the following activities:  Ordering and performing treatments and interventions, ordering and review of laboratory studies, ordering and review of radiographic studies, pulse oximetry, blood draw  for specimens, discussions with consultants, discussions with primary provider and evaluation of patient's response to treatment    I assumed direction of critical care for this patient from another provider in my specialty: no      Care discussed with: admitting provider        Interpretations    O2 Sat: The patients oxygen saturation was 100% on Room Air.  This was independently interpreted by me as Normal        MEDICATIONS GIVEN IN ER    Medications   sodium chloride 0.9 % flush 10 mL (has no administration in time range)         MEDICAL DECISION MAKING, PROGRESS, and CONSULTS    All labs, if obtained, have been independently reviewed by me.  All radiology studies, if obtained, have been reviewed by me and the radiologist dictating the report.  All EKG's, if obtained, have been independently viewed and interpreted by me      Discussion below represents my analysis of pertinent findings related to patient's condition, differential diagnosis, treatment plan and final disposition.      Differential diagnosis:    44-year-old female with known outpatient hemoglobin of 4 presenting the ED with need for blood transfusion.  Will recheck the patient's hemoglobin, order type and screen, 3 units of blood transfusion, and contact the patient's primary care physician.  She will likely need admission given the severity of her anemia.  She has known menorrhagia, this is already being managed by GYN.  She is not actively bleeding currently.  She had multiple studies regarding I do not think she needs recurrent imaging today.    Additional Sources:  Discussed/ obtained information from independent historians:  Daughter at bedside  External (non-ED) record review:  OB/GYN note 1/15/2025 regarding met mental menorrhagia       Orders placed during this visit:  Orders Placed This Encounter   Procedures    Comprehensive Metabolic Panel    Protime-INR    aPTT    Magnesium    CBC Auto Differential    Scan Slide    Verify Informed  Consent for Blood Product Administration    Type & Screen    Prepare RBC, 3 Units    Insert Peripheral IV    Initiate Observation Status    CBC & Differential         Additional orders considered but not ordered:  None    ED Course:    Consultants:  Hospitalist    ED Course as of 01/21/25 1935 Tue Jan 21, 2025 1921 CBC & Differential(!!)  Hemoglobin is 4, confirming the outpatient labs, type and screen and blood transfusion as previously been ordered [CS]      ED Course User Index  [CS] Tom Walsh MD           After my consideration of clinical presentation and any laboratory/radiology studies obtained, I discussed the findings with the patient/patient representative who is in agreement with the treatment plan and the final disposition. Risks and benefits of admission was discussed     AS OF 19:35 EST VITALS:    BP - 129/73  HR - 105  TEMP - 97.8 °F (36.6 °C) (Oral)  O2 SATS - 100%    I reviewed the patients prescription monitoring report if available prior to discharge    DIAGNOSIS  Final diagnoses:   Acute blood loss anemia   Menorrhagia with irregular cycle         DISPOSITION  ED Disposition       ED Disposition   Decision to Admit    Condition   --    Comment   Level of Care: Telemetry [5]   Diagnosis: Acute blood loss anemia [040531]   Admitting Physician: LOKESH HEDRICK [[id number]]   Attending Physician: LOKESH HEDRICK [[id number]]                     Please note that portions of this document were completed with voice recognition software.        Tom Walsh MD  01/21/25 1942

## 2025-01-23 ENCOUNTER — TELEPHONE (OUTPATIENT)
Dept: OBSTETRICS AND GYNECOLOGY | Facility: CLINIC | Age: 45
End: 2025-01-23

## 2025-01-23 LAB
BH BB BLOOD EXPIRATION DATE: NORMAL
BH BB BLOOD TYPE BARCODE: 7300
BH BB DISPENSE STATUS: NORMAL
BH BB PRODUCT CODE: NORMAL
BH BB UNIT NUMBER: NORMAL
CROSSMATCH INTERPRETATION: NORMAL
UNIT  ABO: NORMAL
UNIT  RH: NORMAL

## 2025-01-23 NOTE — PAYOR COMM NOTE
"To:  [employer]  From: Sara Frazier RN  Phone: [phone]  Fax: [phone]  NPI: [id number]  TIN: [id number]  Member ID: [id number]   MRN: [id number]    Lana Beasley (44 y.o. Female)       Date of Birth   1980    Social Security Number       Address   [address] [place] [address] [address] [place] [address]    Home Phone   [phone]    MRN   [id number]       Zoroastrianism   None    Marital Status                               Admission Date   25    Admission Type   Emergency    Admitting Provider   Tra Murphy MD    Attending Provider       Department, Room/Bed   [facility] TELEMETRY 3, 324/1       Discharge Date   2025    Discharge Disposition   Home or Self Care    Discharge Destination                                 Attending Provider: (none)   Allergies: No Known Allergies    Isolation: None   Infection: None   Code Status: Prior    Ht: 160 cm (62.99\")   Wt: 65.7 kg (144 lb 13.5 oz)    Admission Cmt: None   Principal Problem: Acute blood loss anemia [D62]                   Active Insurance as of 2025       Primary Coverage       Payor Plan Insurance Group Employer/Plan Group    [employer] [place] [employer]        Payor Plan Address Payor Plan Phone Number Payor Plan Fax Number Effective Dates    [address] [address] [phone]  2/10/2021 - None Entered    [facility] [address]         Subscriber Name Subscriber Birth Date Member ID       LANA BEASLEY 1980 [id number]                     Emergency Contacts        (Rel.) Home Phone Work Phone Mobile Phone    ZAINAB BEASLEY (Spouse) [phone] -- --    Laverne Beasley (Daughter) -- -- [phone]                 Discharge Summary        Tra Murphy MD at 25 0928              [facility]   INTERNAL MEDICINE DISCHARGE SUMMARY      Name:  Lana Beasley   Age:  44 y.o.  Sex:  female  :  1980  MRN:  [id number]   Visit Number:  [id number]  Primary Care Physician:  Katherine" Tra MOLINA MD  Date of Discharge:  1/22/2025  Admission Date:  1/21/2025      Discharge Diagnosis:         Acute blood loss anemia    Symptomatic anemia    Menometrorrhagia    Iron deficiency anemia due to chronic blood loss          Consults:     Consults       Date and Time Order Name Status Description    1/21/2025  9:41 PM Inpatient Obstetrics / Gynecology Consult              Procedures Performed:                 Hospital Course:   The patient was admitted on 1/21/2025  Please see H&P for details on admission HPI and ROS.  44-year-old patient with history of iron deficiency anemia who has been having menometrorrhagia with chronic bleeding issues was seen by me in the office yesterday with complaints of severe fatigue dyspnea shortness of breath on minimal exertion.  She looked pale and very fatigued.  I had ordered labs and the report came back with hemoglobin of 4.0 today.  So I called her to come to the ER and she is being admitted for further management of the above  She denies any active bleeding at present and order for type and cross and blood transfusion has been given and she is admitted further for observation  After admission patient has had 4 units of blood transfusion.  She is finishing the fourth unit and 2 hours posttransfusion will repeat hemoglobin.  Patient is feeling better.  GYN consult has been done and pending.  She is hemodynamically better and should be stable to be discharged later on in the afternoon today I will follow her back in the office in 10 to 14 days and repeat labs to be done and further management    Patient.  She is to continue her omeprazole and will probably decide to go undergo hysterectomy which she will discuss with the gynecologist.    Vital Signs:     Temp:  [97.8 °F (36.6 °C)-99.2 °F (37.3 °C)] 99.2 °F (37.3 °C)  Heart Rate:  [] 83  Resp:  [16-18] 18  BP: (117-134)/(73-96) 134/93    Physical Exam:     General Appearance:    Alert and cooperative, not in any  "acute distress.   Head:    Atraumatic and normocephalic, without obvious abnormality.   Eyes:            PERRLA.  No pallor. Extraocular movements are within normal limits.   Neck:   Supple. No lymph glands, no bruit.   Lungs:     Chest shape is normal. Breath sounds heard bilaterally equally.  No crackles or wheezing.     Heart:    Normal S1 and S2, no murmur,  no JVD.   Abdomen:     Normal bowel sounds, no masses, no organomegaly. Soft     nontender, no guarding, no rebound tenderness.   Extremities:   Moves all extremities well. No edema, no cyanosis.   Skin:   No bruising or rash.   Neurologic:   Grossly nonfocal and moves all extremities.        Pertinent Lab Results:     Results from last 7 days   Lab Units 01/21/25  1859   SODIUM mmol/L 135*   POTASSIUM mmol/L 3.8   CHLORIDE mmol/L 103   CO2 mmol/L 19.6*   BUN mg/dL 9   CREATININE mg/dL 0.64   CALCIUM mg/dL 9.0   BILIRUBIN mg/dL 0.8   ALK PHOS U/L 64   ALT (SGPT) U/L 8   AST (SGOT) U/L 13   GLUCOSE mg/dL 124*     Results from last 7 days   Lab Units 01/21/25  1859   WBC 10*3/mm3 3.59   HEMOGLOBIN g/dL 4.0*   HEMATOCRIT % 16.3*   PLATELETS 10*3/mm3 219     Results from last 7 days   Lab Units 01/21/25  1859   INR  1.05     No results found for: \"BLOODCX\", \"URINECX\", \"WOUNDCX\", \"MRSACX\"    Pertinent Radiology Results:    Imaging Results (Most Recent)       None                    Discharge Disposition:      Home or Self Care    Discharge Medication:         Discharge Medications        Continue These Medications        Instructions Start Date   omeprazole 20 MG capsule  Commonly known as: priLOSEC   20 mg, Oral, Daily               Discharge Diet:             Follow-up Appointments:      No future appointments.      Test Results Pending at Discharge:             Tra Murphy MD  01/22/25  09:28 EST    Time:  Discharge 24 min    Please note that portions of this note were completed with a voice recognition program.        Electronically signed by Katherine, " Tra MOLINA MD at 01/22/25 0957

## 2025-01-31 ENCOUNTER — OFFICE VISIT (OUTPATIENT)
Dept: OBSTETRICS AND GYNECOLOGY | Facility: CLINIC | Age: 45
End: 2025-01-31
Payer: COMMERCIAL

## 2025-01-31 VITALS
SYSTOLIC BLOOD PRESSURE: 110 MMHG | HEIGHT: 63 IN | DIASTOLIC BLOOD PRESSURE: 78 MMHG | WEIGHT: 144.4 LBS | BODY MASS INDEX: 25.59 KG/M2

## 2025-01-31 DIAGNOSIS — N92.1 MENORRHAGIA WITH IRREGULAR CYCLE: Primary | ICD-10-CM

## 2025-01-31 RX ORDER — SODIUM CHLORIDE 9 MG/ML
40 INJECTION, SOLUTION INTRAVENOUS AS NEEDED
OUTPATIENT
Start: 2025-01-31

## 2025-01-31 RX ORDER — SODIUM CHLORIDE 0.9 % (FLUSH) 0.9 %
10 SYRINGE (ML) INJECTION AS NEEDED
OUTPATIENT
Start: 2025-01-31

## 2025-01-31 RX ORDER — SODIUM CHLORIDE 0.9 % (FLUSH) 0.9 %
10 SYRINGE (ML) INJECTION EVERY 12 HOURS SCHEDULED
OUTPATIENT
Start: 2025-01-31

## 2025-01-31 RX ORDER — PHENAZOPYRIDINE HYDROCHLORIDE 100 MG/1
200 TABLET, FILM COATED ORAL ONCE
OUTPATIENT
Start: 2025-01-31 | End: 2025-01-31

## 2025-01-31 NOTE — PROGRESS NOTES
Subjective   Chief Complaint   Patient presents with    Follow-up     Follow up on abnormal bleeding     Lana Beasley is a 44 y.o. year old .  Patient's last menstrual period was 2024 (approximate).  She presents to be seen because of menorrhagia with irregular cycle.  Patient recently underwent transfusion 2 units packed red blood cells due to severe anemia.  She is not having any bleeding of merit as she is on continual Provera..     OTHER COMPLAINTS:  Nothing else    The following portions of the patient's history were reviewed and updated as appropriate:She  has a past medical history of Anemia, Body piercing, COVID-19 vaccine series completed, GERD (gastroesophageal reflux disease), History of blood transfusion, Iron deficiency, and Wears glasses.  She does not have any pertinent problems on file.  She  has a past surgical history that includes Esophagogastroduodenoscopy (N/A, 2020); Colonoscopy (N/A, 3/5/2021); d & c hysteroscopy (N/A, 2021); Dilation and curettage of uterus; and Hysteroscopy.  Her family history includes No Known Problems in her father and mother.  She  reports that she has never smoked. She has never used smokeless tobacco. She reports that she does not drink alcohol and does not use drugs.  Current Outpatient Medications   Medication Sig Dispense Refill    medroxyPROGESTERone (Provera) 10 MG tablet Take 1 tablet by mouth Daily. 30 tablet 5    omeprazole (priLOSEC) 20 MG capsule Take 1 capsule by mouth Daily. 30 capsule 5     No current facility-administered medications for this visit.     Current Outpatient Medications on File Prior to Visit   Medication Sig    medroxyPROGESTERone (Provera) 10 MG tablet Take 1 tablet by mouth Daily.    omeprazole (priLOSEC) 20 MG capsule Take 1 capsule by mouth Daily.     No current facility-administered medications on file prior to visit.     She has No Known Allergies.    Social History    Tobacco Use      Smoking status:  "Never      Smokeless tobacco: Never    Review of Systems  Consitutional POS: nothing reported    NEG: anorexia or night sweats   Gastointestinal POS: nothing reported    NEG: bloating, change in bowel habits, melena, or reflux symptoms   Genitourinary POS: nothing reported    NEG: dysuria or hematuria   Integument POS: nothing reported    NEG: moles that are changing in size, shape, color or rashes   Breast POS: nothing reported    NEG: persistent breast lump, skin dimpling, or nipple discharge         Respiratory: negative  Cardiovascular: negative  GYN:   See HPI          Objective   /78   Ht 160 cm (63\")   Wt 65.5 kg (144 lb 6.4 oz)   LMP 12/04/2024 (Approximate)   BMI 25.58 kg/m²     General:  well developed; well nourished  no acute distress  mentation appropriate   Skin:  No suspicious lesions seen   Thyroid: normal to inspection and palpation   Lungs:  breathing is unlabored  clear to auscultation bilaterally   Heart:  regular rate and rhythm, S1, S2 normal, no murmur, click, rub or gallop   Breasts:  Not performed.   Abdomen: soft, non-tender; no masses  no umbilical or inguinal hernias are present  no hepato-splenomegaly   Pelvis: Not performed.     Psychiatric: Alert and oriented ×3, mood and affect appropriate  HEENT: Atraumatic, normocephalic, normal scleral icterus  Extremities: 2+ pulses bilaterally, no edema      Lab Review   CBC    Imaging   Pelvic ultrasound report        Assessment   Menometrorrhagia with resultant anemia  Enlarged uterus     Plan   Total laparoscopic hysterectomy/cystoscopy  R/B A    No orders of the defined types were placed in this encounter.         This note was electronically signed.      January 31, 2025      "

## 2025-02-17 ENCOUNTER — PRE-ADMISSION TESTING (OUTPATIENT)
Dept: PREADMISSION TESTING | Facility: HOSPITAL | Age: 45
End: 2025-02-17
Payer: COMMERCIAL

## 2025-02-17 ENCOUNTER — TELEPHONE (OUTPATIENT)
Dept: OBSTETRICS AND GYNECOLOGY | Facility: CLINIC | Age: 45
End: 2025-02-17
Payer: COMMERCIAL

## 2025-02-17 VITALS — WEIGHT: 144.2 LBS | BODY MASS INDEX: 24.62 KG/M2 | HEIGHT: 64 IN

## 2025-02-17 DIAGNOSIS — N92.1 MENORRHAGIA WITH IRREGULAR CYCLE: ICD-10-CM

## 2025-02-17 LAB
BACTERIA UR QL AUTO: ABNORMAL /HPF
BILIRUB UR QL STRIP: NEGATIVE
CLARITY UR: CLEAR
COLOR UR: YELLOW
GLUCOSE UR STRIP-MCNC: NEGATIVE MG/DL
HGB UR QL STRIP.AUTO: ABNORMAL
HYALINE CASTS UR QL AUTO: ABNORMAL /LPF
KETONES UR QL STRIP: ABNORMAL
LEUKOCYTE ESTERASE UR QL STRIP.AUTO: ABNORMAL
NITRITE UR QL STRIP: NEGATIVE
PH UR STRIP.AUTO: 6 [PH] (ref 5–8)
PROT UR QL STRIP: ABNORMAL
RBC # UR STRIP: ABNORMAL /HPF
REF LAB TEST METHOD: ABNORMAL
SP GR UR STRIP: 1.02 (ref 1–1.03)
SQUAMOUS #/AREA URNS HPF: ABNORMAL /HPF
UROBILINOGEN UR QL STRIP: ABNORMAL
WBC # UR STRIP: ABNORMAL /HPF

## 2025-02-17 PROCEDURE — 80053 COMPREHEN METABOLIC PANEL: CPT | Performed by: OBSTETRICS & GYNECOLOGY

## 2025-02-17 PROCEDURE — 85730 THROMBOPLASTIN TIME PARTIAL: CPT | Performed by: OBSTETRICS & GYNECOLOGY

## 2025-02-17 PROCEDURE — 85025 COMPLETE CBC W/AUTO DIFF WBC: CPT | Performed by: OBSTETRICS & GYNECOLOGY

## 2025-02-17 PROCEDURE — 85610 PROTHROMBIN TIME: CPT | Performed by: OBSTETRICS & GYNECOLOGY

## 2025-02-17 PROCEDURE — 81001 URINALYSIS AUTO W/SCOPE: CPT

## 2025-02-17 NOTE — DISCHARGE INSTRUCTIONS
Pre-Admission testing appointment completed today for patient's upcoming procedure here at [facility].    PAT PASS reviewed with patient and they verbalize understanding of the following:     Do not eat or drink anything after midnight the night before procedure unless otherwise instructed by physician/surgeon's office, this includes no gum, candy, mints, tobacco products or e-cigarettes.  Do not shave the area to be operated on at least 48 hours prior to procedure.  Do not wear makeup, lotion, hair products, or nail polish.  Do not wear any jewelry and remove all piercings.  Do not wear any adhesive if you wear dentures.  Do not wear contacts; bring in glasses if needed.  Only take medications on the morning of procedure as instructed by PAT nurse per anesthesia guidelines or as instructed by physician's office.  If you are on any blood thinners reach out to the physician/surgeon's office for instructions on when/if they will need to be stopped prior to procedure.  Bring in picture ID and insurance card, advanced directive copies if applicable, CPAP/BIPAP/Inhalers if indicated morning of procedure, leave any other valuables at home.  Ensure you have arranged for someone to drive you home the day of your procedure and someone to care for you at home afterwards. It is recommended that you do not drive, drink alcohol, or make any major legal decisions for at least 24 hours after your procedure is complete.  Chlorhexadine sponge along with instruction/information sheet given to patient. Readybath wipes given in lieu of CHG if patient has CHG allergy. Instructed patient to date, time, and initial the verification sheet once skin prep has been completed, and to return to [facility] the day of the procedure.  ERAS instructions given to patient unless otherwise instructed per surgeon's orders.     Anesthesia FAQ tip sheet given to patient.  Instructions and information given to patient about parking, hospital  entrance, and registration location.GYNECOLOGICAL PREP:   Soap suds enema and CHG sponge. You will be provided with these and instructed on use at PAT appointment.   Purchase two medicated douches. Follow package instructions on use.      Schedule:  On the evening before the procedure, do the following:  Use the soap suds enema.   2.   Shower using the CHG sponge(s).  3.   Use one medicated douche     On the morning of the procedure, do the followin.   Use the second medicated douche, prior to leaving for the hospital.    2.  Upon arrival to the hospital, you will be given CHG skin prep wipes and instructed on how to use them by your pre-op nurse.

## 2025-02-27 ENCOUNTER — ANESTHESIA (OUTPATIENT)
Dept: PERIOP | Facility: HOSPITAL | Age: 45
End: 2025-02-27
Payer: COMMERCIAL

## 2025-02-27 ENCOUNTER — ANESTHESIA EVENT (OUTPATIENT)
Dept: PERIOP | Facility: HOSPITAL | Age: 45
End: 2025-02-27
Payer: COMMERCIAL

## 2025-02-27 ENCOUNTER — HOSPITAL ENCOUNTER (OUTPATIENT)
Facility: HOSPITAL | Age: 45
Discharge: HOME | End: 2025-02-28
Attending: OBSTETRICS & GYNECOLOGY | Admitting: OBSTETRICS & GYNECOLOGY
Payer: COMMERCIAL

## 2025-02-27 DIAGNOSIS — N92.1 MENORRHAGIA WITH IRREGULAR CYCLE: ICD-10-CM

## 2025-02-27 DIAGNOSIS — Z90.710 S/P LAPAROSCOPIC HYSTERECTOMY: Primary | ICD-10-CM

## 2025-02-27 LAB
ABO GROUP BLD: NORMAL
B-HCG UR QL: NEGATIVE
BLD GP AB SCN SERPL QL: NEGATIVE
EXPIRATION DATE: NORMAL
INTERNAL NEGATIVE CONTROL: NEGATIVE
INTERNAL POSITIVE CONTROL: POSITIVE
Lab: NORMAL
RH BLD: POSITIVE
T&S EXPIRATION DATE: NORMAL

## 2025-02-27 PROCEDURE — 25010000002 DEXAMETHASONE PER 1 MG: Performed by: NURSE ANESTHETIST, CERTIFIED REGISTERED

## 2025-02-27 PROCEDURE — G0378 HOSPITAL OBSERVATION PER HR: HCPCS

## 2025-02-27 PROCEDURE — 25010000002 HYDROMORPHONE PER 4 MG: Performed by: NURSE ANESTHETIST, CERTIFIED REGISTERED

## 2025-02-27 PROCEDURE — 25010000002 ONDANSETRON PER 1 MG: Performed by: OBSTETRICS & GYNECOLOGY

## 2025-02-27 PROCEDURE — 25010000002 LIDOCAINE (CARDIAC): Performed by: NURSE ANESTHETIST, CERTIFIED REGISTERED

## 2025-02-27 PROCEDURE — 86901 BLOOD TYPING SEROLOGIC RH(D): CPT | Performed by: OBSTETRICS & GYNECOLOGY

## 2025-02-27 PROCEDURE — 25010000002 CEFAZOLIN PER 500 MG: Performed by: OBSTETRICS & GYNECOLOGY

## 2025-02-27 PROCEDURE — 25010000002 ONDANSETRON PER 1 MG: Performed by: NURSE ANESTHETIST, CERTIFIED REGISTERED

## 2025-02-27 PROCEDURE — 81025 URINE PREGNANCY TEST: CPT | Performed by: OBSTETRICS & GYNECOLOGY

## 2025-02-27 PROCEDURE — 25010000002 PROPOFOL 10 MG/ML EMULSION: Performed by: NURSE ANESTHETIST, CERTIFIED REGISTERED

## 2025-02-27 PROCEDURE — 25810000003 LACTATED RINGERS PER 1000 ML: Performed by: OBSTETRICS & GYNECOLOGY

## 2025-02-27 PROCEDURE — 25010000002 KETOROLAC TROMETHAMINE PER 15 MG: Performed by: NURSE ANESTHETIST, CERTIFIED REGISTERED

## 2025-02-27 PROCEDURE — 86900 BLOOD TYPING SEROLOGIC ABO: CPT | Performed by: OBSTETRICS & GYNECOLOGY

## 2025-02-27 PROCEDURE — 25010000002 GLYCOPYRROLATE PF 0.4 MG/2ML SOLUTION: Performed by: NURSE ANESTHETIST, CERTIFIED REGISTERED

## 2025-02-27 PROCEDURE — 25010000002 FENTANYL CITRATE (PF) 50 MCG/ML SOLUTION: Performed by: NURSE ANESTHETIST, CERTIFIED REGISTERED

## 2025-02-27 PROCEDURE — 25010000002 SUGAMMADEX 200 MG/2ML SOLUTION: Performed by: NURSE ANESTHETIST, CERTIFIED REGISTERED

## 2025-02-27 PROCEDURE — 25010000002 MIDAZOLAM PER 1MG: Performed by: NURSE ANESTHETIST, CERTIFIED REGISTERED

## 2025-02-27 PROCEDURE — 86850 RBC ANTIBODY SCREEN: CPT | Performed by: OBSTETRICS & GYNECOLOGY

## 2025-02-27 PROCEDURE — 25010000002 HALOPERIDOL LACTATE PER 5 MG: Performed by: NURSE ANESTHETIST, CERTIFIED REGISTERED

## 2025-02-27 PROCEDURE — 58571 TLH W/T/O 250 G OR LESS: CPT | Performed by: OBSTETRICS & GYNECOLOGY

## 2025-02-27 DEVICE — SEAL HEMO SURG ARISTA/AH ABS/PWDR 5GM: Type: IMPLANTABLE DEVICE | Site: ABDOMEN | Status: FUNCTIONAL

## 2025-02-27 RX ORDER — NALOXONE HCL 0.4 MG/ML
0.1 VIAL (ML) INJECTION
Status: DISCONTINUED | OUTPATIENT
Start: 2025-02-27 | End: 2025-02-28 | Stop reason: HOSPADM

## 2025-02-27 RX ORDER — ONDANSETRON 2 MG/ML
INJECTION INTRAMUSCULAR; INTRAVENOUS AS NEEDED
Status: DISCONTINUED | OUTPATIENT
Start: 2025-02-27 | End: 2025-02-27 | Stop reason: SURG

## 2025-02-27 RX ORDER — SODIUM CHLORIDE 9 MG/ML
40 INJECTION, SOLUTION INTRAVENOUS AS NEEDED
Status: DISCONTINUED | OUTPATIENT
Start: 2025-02-27 | End: 2025-02-27 | Stop reason: HOSPADM

## 2025-02-27 RX ORDER — ONDANSETRON 4 MG/1
4 TABLET, ORALLY DISINTEGRATING ORAL EVERY 6 HOURS PRN
Status: DISCONTINUED | OUTPATIENT
Start: 2025-02-27 | End: 2025-02-28 | Stop reason: HOSPADM

## 2025-02-27 RX ORDER — DOCUSATE SODIUM 100 MG/1
100 CAPSULE, LIQUID FILLED ORAL 2 TIMES DAILY PRN
Status: DISCONTINUED | OUTPATIENT
Start: 2025-02-27 | End: 2025-02-27 | Stop reason: ALTCHOICE

## 2025-02-27 RX ORDER — MIDAZOLAM HYDROCHLORIDE 2 MG/2ML
INJECTION, SOLUTION INTRAMUSCULAR; INTRAVENOUS AS NEEDED
Status: DISCONTINUED | OUTPATIENT
Start: 2025-02-27 | End: 2025-02-27 | Stop reason: SURG

## 2025-02-27 RX ORDER — KETOROLAC TROMETHAMINE 30 MG/ML
INJECTION, SOLUTION INTRAMUSCULAR; INTRAVENOUS AS NEEDED
Status: DISCONTINUED | OUTPATIENT
Start: 2025-02-27 | End: 2025-02-27 | Stop reason: SURG

## 2025-02-27 RX ORDER — SIMETHICONE 80 MG
80 TABLET,CHEWABLE ORAL 4 TIMES DAILY PRN
Status: DISCONTINUED | OUTPATIENT
Start: 2025-02-27 | End: 2025-02-28 | Stop reason: HOSPADM

## 2025-02-27 RX ORDER — MAGNESIUM HYDROXIDE 1200 MG/15ML
LIQUID ORAL AS NEEDED
Status: DISCONTINUED | OUTPATIENT
Start: 2025-02-27 | End: 2025-02-27 | Stop reason: HOSPADM

## 2025-02-27 RX ORDER — DEXTROSE MONOHYDRATE AND SODIUM CHLORIDE 5; .45 G/100ML; G/100ML
125 INJECTION, SOLUTION INTRAVENOUS CONTINUOUS
Status: ACTIVE | OUTPATIENT
Start: 2025-02-27 | End: 2025-02-28

## 2025-02-27 RX ORDER — EPHEDRINE SULFATE 5 MG/ML
INJECTION INTRAVENOUS AS NEEDED
Status: DISCONTINUED | OUTPATIENT
Start: 2025-02-27 | End: 2025-02-27 | Stop reason: SURG

## 2025-02-27 RX ORDER — NALOXONE HCL 0.4 MG/ML
0.4 VIAL (ML) INJECTION
Status: DISCONTINUED | OUTPATIENT
Start: 2025-02-27 | End: 2025-02-28 | Stop reason: HOSPADM

## 2025-02-27 RX ORDER — SODIUM CHLORIDE 0.9 % (FLUSH) 0.9 %
10 SYRINGE (ML) INJECTION EVERY 12 HOURS SCHEDULED
Status: DISCONTINUED | OUTPATIENT
Start: 2025-02-27 | End: 2025-02-27 | Stop reason: HOSPADM

## 2025-02-27 RX ORDER — IBUPROFEN 800 MG/1
800 TABLET, FILM COATED ORAL EVERY 8 HOURS
Status: DISCONTINUED | OUTPATIENT
Start: 2025-02-28 | End: 2025-02-28 | Stop reason: HOSPADM

## 2025-02-27 RX ORDER — HALOPERIDOL 5 MG/ML
INJECTION INTRAMUSCULAR AS NEEDED
Status: DISCONTINUED | OUTPATIENT
Start: 2025-02-27 | End: 2025-02-27 | Stop reason: SURG

## 2025-02-27 RX ORDER — DEXAMETHASONE SODIUM PHOSPHATE 4 MG/ML
INJECTION, SOLUTION INTRA-ARTICULAR; INTRALESIONAL; INTRAMUSCULAR; INTRAVENOUS; SOFT TISSUE AS NEEDED
Status: DISCONTINUED | OUTPATIENT
Start: 2025-02-27 | End: 2025-02-27 | Stop reason: SURG

## 2025-02-27 RX ORDER — HYDROMORPHONE HYDROCHLORIDE 2 MG/ML
INJECTION, SOLUTION INTRAMUSCULAR; INTRAVENOUS; SUBCUTANEOUS AS NEEDED
Status: DISCONTINUED | OUTPATIENT
Start: 2025-02-27 | End: 2025-02-27 | Stop reason: SURG

## 2025-02-27 RX ORDER — OXYCODONE HYDROCHLORIDE 5 MG/1
10 TABLET ORAL EVERY 4 HOURS PRN
Status: DISCONTINUED | OUTPATIENT
Start: 2025-02-27 | End: 2025-02-28 | Stop reason: HOSPADM

## 2025-02-27 RX ORDER — BISACODYL 10 MG
10 SUPPOSITORY, RECTAL RECTAL DAILY PRN
Status: DISCONTINUED | OUTPATIENT
Start: 2025-02-27 | End: 2025-02-28 | Stop reason: HOSPADM

## 2025-02-27 RX ORDER — ONDANSETRON 2 MG/ML
4 INJECTION INTRAMUSCULAR; INTRAVENOUS EVERY 6 HOURS PRN
Status: DISCONTINUED | OUTPATIENT
Start: 2025-02-27 | End: 2025-02-28 | Stop reason: HOSPADM

## 2025-02-27 RX ORDER — DOCUSATE SODIUM 50 MG/5 ML
100 LIQUID (ML) ORAL 2 TIMES DAILY PRN
Status: DISCONTINUED | OUTPATIENT
Start: 2025-02-27 | End: 2025-02-28 | Stop reason: HOSPADM

## 2025-02-27 RX ORDER — SODIUM CHLORIDE 0.9 % (FLUSH) 0.9 %
10 SYRINGE (ML) INJECTION AS NEEDED
Status: DISCONTINUED | OUTPATIENT
Start: 2025-02-27 | End: 2025-02-27 | Stop reason: HOSPADM

## 2025-02-27 RX ORDER — ACETAMINOPHEN 500 MG
1000 TABLET ORAL EVERY 8 HOURS
Status: DISCONTINUED | OUTPATIENT
Start: 2025-02-27 | End: 2025-02-28 | Stop reason: HOSPADM

## 2025-02-27 RX ORDER — PHENAZOPYRIDINE HYDROCHLORIDE 100 MG/1
200 TABLET, FILM COATED ORAL ONCE
Status: COMPLETED | OUTPATIENT
Start: 2025-02-27 | End: 2025-02-27

## 2025-02-27 RX ORDER — PROPOFOL 10 MG/ML
VIAL (ML) INTRAVENOUS AS NEEDED
Status: DISCONTINUED | OUTPATIENT
Start: 2025-02-27 | End: 2025-02-27 | Stop reason: SURG

## 2025-02-27 RX ORDER — KETAMINE HCL IN NACL, ISO-OSM 100MG/10ML
SYRINGE (ML) INJECTION AS NEEDED
Status: DISCONTINUED | OUTPATIENT
Start: 2025-02-27 | End: 2025-02-27 | Stop reason: SURG

## 2025-02-27 RX ORDER — OXYCODONE HYDROCHLORIDE 5 MG/1
5 TABLET ORAL EVERY 4 HOURS PRN
Status: DISCONTINUED | OUTPATIENT
Start: 2025-02-27 | End: 2025-02-28 | Stop reason: HOSPADM

## 2025-02-27 RX ORDER — ROCURONIUM BROMIDE 50 MG/5 ML
SYRINGE (ML) INTRAVENOUS AS NEEDED
Status: DISCONTINUED | OUTPATIENT
Start: 2025-02-27 | End: 2025-02-27 | Stop reason: SURG

## 2025-02-27 RX ORDER — ONDANSETRON 2 MG/ML
4 INJECTION INTRAMUSCULAR; INTRAVENOUS ONCE AS NEEDED
Status: DISCONTINUED | OUTPATIENT
Start: 2025-02-27 | End: 2025-02-27 | Stop reason: HOSPADM

## 2025-02-27 RX ORDER — FENTANYL CITRATE 50 UG/ML
INJECTION, SOLUTION INTRAMUSCULAR; INTRAVENOUS AS NEEDED
Status: DISCONTINUED | OUTPATIENT
Start: 2025-02-27 | End: 2025-02-27 | Stop reason: SURG

## 2025-02-27 RX ORDER — SODIUM CHLORIDE, SODIUM LACTATE, POTASSIUM CHLORIDE, CALCIUM CHLORIDE 600; 310; 30; 20 MG/100ML; MG/100ML; MG/100ML; MG/100ML
1000 INJECTION, SOLUTION INTRAVENOUS CONTINUOUS
Status: DISCONTINUED | OUTPATIENT
Start: 2025-02-27 | End: 2025-02-27

## 2025-02-27 RX ADMIN — SODIUM CHLORIDE, SODIUM LACTATE, POTASSIUM CHLORIDE, CALCIUM CHLORIDE: 20; 30; 600; 310 INJECTION, SOLUTION INTRAVENOUS at 08:27

## 2025-02-27 RX ADMIN — Medication 25 MG: at 07:28

## 2025-02-27 RX ADMIN — PROPOFOL 160 MG: 10 INJECTION, EMULSION INTRAVENOUS at 07:28

## 2025-02-27 RX ADMIN — DEXAMETHASONE SODIUM PHOSPHATE 8 MG: 4 INJECTION, SOLUTION INTRA-ARTICULAR; INTRALESIONAL; INTRAMUSCULAR; INTRAVENOUS; SOFT TISSUE at 08:18

## 2025-02-27 RX ADMIN — HYDROMORPHONE HYDROCHLORIDE 0.5 MG: 2 INJECTION INTRAMUSCULAR; INTRAVENOUS; SUBCUTANEOUS at 08:00

## 2025-02-27 RX ADMIN — Medication 40 MG: at 07:28

## 2025-02-27 RX ADMIN — DEXTROSE MONOHYDRATE AND SODIUM CHLORIDE 125 ML/HR: 5; .45 INJECTION, SOLUTION INTRAVENOUS at 10:41

## 2025-02-27 RX ADMIN — ONDANSETRON 4 MG: 2 INJECTION INTRAMUSCULAR; INTRAVENOUS at 21:03

## 2025-02-27 RX ADMIN — EPHEDRINE SULFATE 10 MG: 5 INJECTION INTRAVENOUS at 07:47

## 2025-02-27 RX ADMIN — SODIUM CHLORIDE, SODIUM LACTATE, POTASSIUM CHLORIDE, CALCIUM CHLORIDE 1000 ML: 20; 30; 600; 310 INJECTION, SOLUTION INTRAVENOUS at 07:05

## 2025-02-27 RX ADMIN — HYDROMORPHONE HYDROCHLORIDE 0.5 MG: 2 INJECTION INTRAMUSCULAR; INTRAVENOUS; SUBCUTANEOUS at 08:16

## 2025-02-27 RX ADMIN — GLYCOPYRROLATE 0.4 MCG: 0.2 INJECTION, SOLUTION INTRAMUSCULAR; INTRAVENOUS at 07:47

## 2025-02-27 RX ADMIN — PHENAZOPYRIDINE 200 MG: 100 TABLET ORAL at 07:00

## 2025-02-27 RX ADMIN — FENTANYL CITRATE 100 MCG: 50 INJECTION INTRAMUSCULAR; INTRAVENOUS at 07:28

## 2025-02-27 RX ADMIN — SODIUM CHLORIDE 2000 MG: 9 INJECTION, SOLUTION INTRAVENOUS at 07:23

## 2025-02-27 RX ADMIN — DOCUSATE SODIUM 100 MG: 50 LIQUID ORAL at 23:41

## 2025-02-27 RX ADMIN — HALOPERIDOL LACTATE 0.5 MG: 5 INJECTION, SOLUTION INTRAMUSCULAR at 08:18

## 2025-02-27 RX ADMIN — DOCUSATE SODIUM 100 MG: 100 CAPSULE, LIQUID FILLED ORAL at 16:58

## 2025-02-27 RX ADMIN — ONDANSETRON 4 MG: 2 INJECTION INTRAMUSCULAR; INTRAVENOUS at 14:30

## 2025-02-27 RX ADMIN — ACETAMINOPHEN 500 MG: 500 TABLET, FILM COATED ORAL at 14:34

## 2025-02-27 RX ADMIN — SUGAMMADEX 200 MG: 100 INJECTION, SOLUTION INTRAVENOUS at 08:40

## 2025-02-27 RX ADMIN — ONDANSETRON 4 MG: 2 INJECTION INTRAMUSCULAR; INTRAVENOUS at 08:18

## 2025-02-27 RX ADMIN — Medication 25 MG: at 07:40

## 2025-02-27 RX ADMIN — SIMETHICONE 80 MG: 80 TABLET, CHEWABLE ORAL at 23:28

## 2025-02-27 RX ADMIN — DEXTROSE MONOHYDRATE AND SODIUM CHLORIDE 125 ML/HR: 5; .45 INJECTION, SOLUTION INTRAVENOUS at 21:03

## 2025-02-27 RX ADMIN — KETOROLAC TROMETHAMINE 15 MG: 30 INJECTION, SOLUTION INTRAMUSCULAR; INTRAVENOUS at 08:28

## 2025-02-27 RX ADMIN — Medication 10 MG: at 07:40

## 2025-02-27 RX ADMIN — HYDROMORPHONE HYDROCHLORIDE 1 MG: 2 INJECTION INTRAMUSCULAR; INTRAVENOUS; SUBCUTANEOUS at 08:42

## 2025-02-27 RX ADMIN — MIDAZOLAM HYDROCHLORIDE 2 MG: 1 INJECTION, SOLUTION INTRAMUSCULAR; INTRAVENOUS at 07:23

## 2025-02-27 RX ADMIN — LIDOCAINE HYDROCHLORIDE 60 MG: 20 INJECTION, SOLUTION INTRAVENOUS at 07:28

## 2025-02-27 NOTE — OP NOTE
Braden Beasley  : 1980  MRN: [id number]  [facility]: [id number]  Date: 2025    Operative Report    TOTAL LAPAROSCOPIC HYSTERECTOMY  / bilateral salpingectomy / cystoscopy    Pre-op Diagnosis:  Menorrhagia with irregular cycle [N92.1]   Post-op Diagnosis:  Post-Op Diagnosis Codes:     * Menorrhagia with irregular cycle [N92.1]   Procedure: Procedure(s):  TOTAL LAPAROSCOPIC HYSTERECTOMY, bilateral salpingectomy Cystoscopy   Surgeon: Surgeon(s):  Andrew Hackett MD   Assist: Staff  was responsible for performing the following activities: Retraction and Placing Dressing and their skilled assistance was necessary for the success of this case.     Anesthesia: General   Estimated Blood Loss: <5 mls   Fluids: See anesthesia and nursing notes   UOP: See anesthesia and nursing notes   Drains: Reeder   ABx: cefazolin 2 gms   Specimens:  Uterus, cervix, tubes   Findings: Enlarged fibroid uterus.  Approximately 14 weeks size.  Normal ovaries.  Normal bladder mucosa.  Bilateral jets of Protium stained urine noted from the ureteral orifices   Complications: none   Indications: Menorrhagia with resultant anemia and fibroid uterus     Description of Procedure:  After the appropriate time out and after adequate dosing of her anesthesia, the patient was prepped and draped in the usual sterile fashion.  The patient was placed in the dorsal lithotomy position using Roge stirrups.  A reeder catheter had been placed in the bladder for drainage during the procedure.  A weighted speculum was placed in the vagina.  The anterior lip of the cervix was grasped with a single tooth tenaculum.  The uterus was sounded.  The cervix was dilated.  Measurements were taken and the VINOD manipulator was placed in the usual fashion.  An infraumbilical skin incision was made with the knife and a 10 mm trocar was inserted using the Optiview without any complications.  The abdomen was insufflated with CO2 being sure to keep  the pressure less than 15 mmHg.  Bilateral ancillary ports were placed with 5 mm port in the right lower quadrant and a 10 mm port in the left lower quadrant with the aid of transillumination and after identification of the inferior epigastric vessels.  The ports were placed under direct visualization without any complications.  The pelvis was explored with the above findings noted.  The ureters were identified bilaterally and noted to be out of the operative fields at all times.  Mesosalpinx cauterized transected distal to proximal allowing for amputation removal of the tubes.  This was done with the LigaSure.  Round ligaments were cauterized and transected bilaterally with the LigaSure.  Utero-ovarian ligaments cauterized transected bilaterally with the LigaSure.  Dissection was carried along the broad ligament .  The bladder flap was created.  The uterine vasculature was skeletonized, cauterized and cut with the LigaSure.  A similar process was repeated on the patient's contralateral side without any complications.  Once adequate dissection and development of the bladder flap had been created and a white fascial plane was noted, circumferential excision of the specimen overlying the VINOD cup in the vagina was carried out and the specimen was amputated, retrieved, and held in the vagina to maintain a pneumoperitoneum.  Bleeding points were cauterized.  The vaginal cuff was closed in a running fashion with 0 Vicryl in a running locking fashion x 2..  Irrigation and aspiration was carried out.  No bleeding was noted.  Earl was placed overlying the surgical site.  The abdomen was released of CO2 and no bleeding was noted.  Instrumentation was removed.  Full desufflation was then carried out.  The incisions were inspected and noted to be hemostatic with minimal bovie electrocautery.  The incisions were closed with 4-0 nylon in an interrupted fashion.  Dressings were placed.  The reeder catheter was removed.  The  cystoscope was introduced.  The patient was noted to have bilateral ureteral jet flow of pyridium stained urine.  There was no evidence of bladder mucosal injury.  The reeder catheter was re-anchored.  A vaginal pack was placed.  All instrument and sponge counts were correct at the end of the procedure.  There were no complications.  The patient tolerated the procedure well.  She was taken to the postoperative recovery room in stable condition.      Andrew Hackett MD   2/27/2025  08:48 EST

## 2025-02-27 NOTE — PLAN OF CARE
Goal Outcome Evaluation:  Plan of Care Reviewed With: patient           Outcome Evaluation: Pt experienced intermittent nausea throughout shift. C/o pain and gas but declined medications. Pt agreed to take 500 mg of tylenol instead of 1000. Adequate output. Ambulated to restroom for bowel movement. Ambulation encouraged. Son in law at bedside. Pt requires intrepreter. Continue plan of care.

## 2025-02-27 NOTE — ANESTHESIA PROCEDURE NOTES
Airway  Urgency: elective    Date/Time: 2/27/2025 7:29 AM  End Time:2/27/2025 7:38 AM  Airway not difficult    General Information and Staff    Patient location during procedure: OR  CRNA/CAA: David Paez CRNA    Indications and Patient Condition  Indications for airway management: airway protection    Preoxygenated: yes  Mask difficulty assessment: 1 - vent by mask    Final Airway Details  Final airway type: endotracheal airway      Successful airway: ETT  Cuffed: yes   Successful intubation technique: direct laryngoscopy  Facilitating devices/methods: anterior pressure/BURP  Endotracheal tube insertion site: oral  Blade: Margoth  Blade size: 3  ETT size (mm): 7.0  Cormack-Lehane Classification: grade IIb - view of arytenoids or posterior of glottis only  Placement verified by: chest auscultation and capnometry   Cuff volume (mL): 6  Measured from: lips  ETT/EBT  to lips (cm): 20  Number of attempts at approach: 1  Assessment: lips, teeth, and gum same as pre-op and atraumatic intubation    Additional Comments  +ETCO2, BBS, atraumatic.

## 2025-02-27 NOTE — ANESTHESIA PREPROCEDURE EVALUATION
Anesthesia Evaluation     Patient summary reviewed and Nursing notes reviewed   no history of anesthetic complications:   NPO Solid Status: > 8 hours  NPO Liquid Status: > 8 hours           Airway   Mallampati: II  TM distance: >3 FB  Neck ROM: full  Possible difficult intubation  Dental - normal exam     Pulmonary - negative pulmonary ROS and normal exam   (-) not a smoker  Cardiovascular - negative cardio ROS and normal exam  Exercise tolerance: good (4-7 METS)        Neuro/Psych- negative ROS  GI/Hepatic/Renal/Endo    (+) GERD, GI bleeding upper     Musculoskeletal (-) negative ROS    Abdominal    Substance History - negative use     OB/GYN negative ob/gyn ROS         Other   blood dyscrasia anemia,       Other Comment: hgb 5.8  Transfused 3 units 12/11.   ROS/Med Hx Other: Needs ,                 Anesthesia Plan    ASA 3     general     (Risks and benefits discussed including risk of aspiration, recall and dental damage. All patient questions answered via .    Will continue with plan of care.)  intravenous induction     Anesthetic plan, risks, benefits, and alternatives have been provided, discussed and informed consent has been obtained with: patient.  Pre-procedure education provided  Plan discussed with CRNA.

## 2025-02-27 NOTE — ANESTHESIA POSTPROCEDURE EVALUATION
Patient: Lana Beasley    Procedure Summary       Date: 02/27/25 Room / Location: [facility] OR 1 /  [facility] OR    Anesthesia Start: 0723 Anesthesia Stop: 0858    Procedure: TOTAL LAPAROSCOPIC HYSTERECTOMY, bilateral salpingectomy Cystoscopy (Abdomen) Diagnosis:       Menorrhagia with irregular cycle      (Menorrhagia with irregular cycle [N92.1])    Surgeons: Andrew Hackett MD Provider: David Paez CRNA    Anesthesia Type: general ASA Status: 3            Anesthesia Type: general    Vitals  No vitals data found for the desired time range.          Post Anesthesia Care and Evaluation    Patient location during evaluation: PACU  Patient participation: complete - patient participated  Level of consciousness: awake and alert  Pain score: 2  Pain management: satisfactory to patient    Airway patency: patent  Anesthetic complications: No anesthetic complications  PONV Status: none  Cardiovascular status: acceptable and stable  Respiratory status: acceptable and spontaneous ventilation  Hydration status: acceptable    Comments: Vitals signs as noted in nursing documentation as per protocol.

## 2025-02-28 VITALS
RESPIRATION RATE: 16 BRPM | HEART RATE: 80 BPM | TEMPERATURE: 98.5 F | SYSTOLIC BLOOD PRESSURE: 151 MMHG | OXYGEN SATURATION: 99 % | DIASTOLIC BLOOD PRESSURE: 96 MMHG

## 2025-02-28 PROBLEM — Z90.710 S/P LAPAROSCOPIC HYSTERECTOMY: Status: ACTIVE | Noted: 2025-02-28

## 2025-02-28 LAB
DEPRECATED RDW RBC AUTO: 58 FL (ref 37–54)
ERYTHROCYTE [DISTWIDTH] IN BLOOD BY AUTOMATED COUNT: 19.9 % (ref 12.3–15.4)
HCT VFR BLD AUTO: 28.2 % (ref 34–46.6)
HGB BLD-MCNC: 8.9 G/DL (ref 12–15.9)
MCH RBC QN AUTO: 25.5 PG (ref 26.6–33)
MCHC RBC AUTO-ENTMCNC: 31.6 G/DL (ref 31.5–35.7)
MCV RBC AUTO: 80.8 FL (ref 79–97)
PLATELET # BLD AUTO: 205 10*3/MM3 (ref 140–450)
PMV BLD AUTO: 10.5 FL (ref 6–12)
RBC # BLD AUTO: 3.49 10*6/MM3 (ref 3.77–5.28)
REF LAB TEST METHOD: NORMAL
WBC NRBC COR # BLD AUTO: 9.27 10*3/MM3 (ref 3.4–10.8)

## 2025-02-28 PROCEDURE — 85027 COMPLETE CBC AUTOMATED: CPT | Performed by: OBSTETRICS & GYNECOLOGY

## 2025-02-28 PROCEDURE — 99024 POSTOP FOLLOW-UP VISIT: CPT | Performed by: MIDWIFE

## 2025-02-28 PROCEDURE — 25010000002 ONDANSETRON PER 1 MG: Performed by: OBSTETRICS & GYNECOLOGY

## 2025-02-28 RX ORDER — FAMOTIDINE 10 MG/ML
20 INJECTION, SOLUTION INTRAVENOUS 2 TIMES DAILY PRN
Status: DISCONTINUED | OUTPATIENT
Start: 2025-02-28 | End: 2025-02-28 | Stop reason: HOSPADM

## 2025-02-28 RX ORDER — ONDANSETRON 4 MG/1
4 TABLET, FILM COATED ORAL EVERY 8 HOURS PRN
Qty: 30 TABLET | Refills: 0 | Status: SHIPPED | OUTPATIENT
Start: 2025-02-28 | End: 2026-02-28

## 2025-02-28 RX ORDER — FERROUS SULFATE 324(65)MG
324 TABLET, DELAYED RELEASE (ENTERIC COATED) ORAL 2 TIMES DAILY WITH MEALS
Status: DISCONTINUED | OUTPATIENT
Start: 2025-02-28 | End: 2025-02-28 | Stop reason: HOSPADM

## 2025-02-28 RX ORDER — SCOPOLAMINE 1 MG/3D
1 PATCH, EXTENDED RELEASE TRANSDERMAL
Status: DISCONTINUED | OUTPATIENT
Start: 2025-02-28 | End: 2025-02-28 | Stop reason: HOSPADM

## 2025-02-28 RX ORDER — FAMOTIDINE 20 MG/1
20 TABLET, FILM COATED ORAL 2 TIMES DAILY PRN
Qty: 60 TABLET | Refills: 0 | Status: SHIPPED | OUTPATIENT
Start: 2025-02-28 | End: 2026-02-28

## 2025-02-28 RX ORDER — IBUPROFEN 800 MG/1
800 TABLET, FILM COATED ORAL EVERY 8 HOURS PRN
Qty: 60 TABLET | Refills: 0 | Status: SHIPPED | OUTPATIENT
Start: 2025-02-28

## 2025-02-28 RX ORDER — ACETAMINOPHEN 500 MG
1000 TABLET ORAL EVERY 8 HOURS PRN
Qty: 60 TABLET | Refills: 0 | Status: SHIPPED | OUTPATIENT
Start: 2025-02-28

## 2025-02-28 RX ORDER — DOCUSATE SODIUM 100 MG/1
100 CAPSULE, LIQUID FILLED ORAL 2 TIMES DAILY PRN
Qty: 30 CAPSULE | Refills: 0 | Status: SHIPPED | OUTPATIENT
Start: 2025-02-28

## 2025-02-28 RX ORDER — OXYCODONE HYDROCHLORIDE 5 MG/1
5 TABLET ORAL EVERY 6 HOURS PRN
Qty: 12 TABLET | Refills: 0 | Status: SHIPPED | OUTPATIENT
Start: 2025-02-28 | End: 2025-03-09

## 2025-02-28 RX ADMIN — FAMOTIDINE 20 MG: 10 INJECTION, SOLUTION INTRAVENOUS at 09:04

## 2025-02-28 RX ADMIN — ONDANSETRON 4 MG: 2 INJECTION INTRAMUSCULAR; INTRAVENOUS at 08:16

## 2025-02-28 RX ADMIN — SCOPOLAMINE 1 PATCH: 1.5 PATCH, EXTENDED RELEASE TRANSDERMAL at 09:57

## 2025-02-28 RX ADMIN — IBUPROFEN 800 MG: 800 TABLET, FILM COATED ORAL at 10:51

## 2025-02-28 NOTE — PLAN OF CARE
Goal Outcome Evaluation:     Problem: Adult Inpatient Plan of Care  Goal: Plan of Care Review  Outcome: Met  Goal: Patient-Specific Goal (Individualized)  Outcome: Met  Goal: Absence of Hospital-Acquired Illness or Injury  Outcome: Met  Intervention: Identify and Manage Fall Risk  Recent Flowsheet Documentation  Taken 2/28/2025 1200 by Kateryna Wellington RN  Safety Promotion/Fall Prevention:   safety round/check completed   assistive device/personal items within reach  Taken 2/28/2025 1100 by Kateryna Wellington RN  Safety Promotion/Fall Prevention:   safety round/check completed   assistive device/personal items within reach  Taken 2/28/2025 1051 by Kateryna Wellington RN  Safety Promotion/Fall Prevention:   safety round/check completed   assistive device/personal items within reach  Taken 2/28/2025 1000 by Kateryna Wellington RN  Safety Promotion/Fall Prevention:   safety round/check completed   assistive device/personal items within reach  Taken 2/28/2025 0816 by Kateryna Wellington RN  Safety Promotion/Fall Prevention:   safety round/check completed   assistive device/personal items within reach  Intervention: Prevent Skin Injury  Recent Flowsheet Documentation  Taken 2/28/2025 1200 by Kateryna Wellington RN  Body Position: sitting up in bed  Taken 2/28/2025 1100 by Kateryna Wellington RN  Body Position: sitting up in bed  Taken 2/28/2025 1051 by Kateryna Wellington RN  Body Position: sitting up in bed  Taken 2/28/2025 1000 by Kateryna Wellington RN  Body Position: sitting up in bed  Taken 2/28/2025 0816 by Kateryna Wellington RN  Body Position: position changed independently  Intervention: Prevent and Manage VTE (Venous Thromboembolism) Risk  Recent Flowsheet Documentation  Taken 2/28/2025 0816 by Kateryna Wellington RN  VTE Prevention/Management:   bilateral   SCDs (sequential compression devices) off   patient refused intervention  Goal: Optimal Comfort and Wellbeing  Outcome: Met  Intervention: Monitor Pain and Promote Comfort  Recent  Flowsheet Documentation  Taken 2/28/2025 1051 by Kateryna Wellington RN  Pain Management Interventions: pain medication given  Taken 2/28/2025 0816 by Kateryna Wellington RN  Pain Management Interventions: (pt reports being too nauseous to take any medication by mouth and refuses any IV pain medication at this time. Provider notified and orders given for IV zofran, IV pepcid, and scopalamine patch)   no interventions per patient request   other (see comments)  Goal: Readiness for Transition of Care  Outcome: Met     Problem: Skin Injury Risk Increased  Goal: Skin Health and Integrity  Outcome: Met  Intervention: Optimize Skin Protection  Recent Flowsheet Documentation  Taken 2/28/2025 1200 by Kateryna Wellington RN  Activity Management: up ad bev  Head of Bed (HOB) Positioning: HOB elevated  Taken 2/28/2025 1100 by Kateryna Wellington RN  Activity Management: up ad bev  Head of Bed (HOB) Positioning: HOB elevated  Taken 2/28/2025 1051 by Kateryna Wellington RN  Activity Management: up ad bev  Head of Bed (HOB) Positioning: HOB elevated  Taken 2/28/2025 1000 by Kateryna Wellington RN  Activity Management: ambulated in room  Head of Bed (HOB) Positioning: HOB elevated  Taken 2/28/2025 0816 by Kateryna Wellington RN  Activity Management:   up ad bev   activity encouraged  Head of Bed (HOB) Positioning: HOB elevated      VSS, pain controlled with current medication regimen, pt ambulating and voiding without difficulty, nausea improved with IV zofran/IV pepcid/scopolamine patch, pt tolerating PO meds and meals. Pt to be discharged home today.

## 2025-02-28 NOTE — PROGRESS NOTES
KEVIN Braden Beasley  : 1980  MRN: [id number]  CSN: [id number]    Post-operative Day #1  Subjective   Daughter at bedside to assist with interpretation. Her pain is not well controlled.  She is having a lot of nausea therefore has not been taking pain meds. Her catheter was Dc'd this morning and she hasn't voided yet. She is not passing gas and has not had a bowel movement. She is belching a lot and c/o sour taste in mouth.      Objective     Min/max vitals past 24 hours:   Temp  Min: 96.5 °F (35.8 °C)  Max: 98.4 °F (36.9 °C)  BP  Min: 117/88  Max: 152/97  Pulse  Min: 77  Max: 99  Resp  Min: 14  Max: 18        I/O last 3 completed shifts:  In: 1308 [P.O.:8; I.V.:1200; IV Piggyback:100]  Out: 4175 [Urine:3250; Emesis/NG output:900; Blood:25]    General: well developed; well nourished  no acute distress   Resp: breathing is unlabored   CV: Not performed.   Abdomen: soft, non-tender; no masses  incisions are clean, dry, intact, without drainage, and x 3   Pelvic: Not performed   Ext: normal appearance with no cyanosis or edema and no calf tenderness       WBC   Date/Time Value Ref Range Status   2025 0618 9.27 3.40 - 10.80 10*3/mm3 Final     Hemoglobin   Date/Time Value Ref Range Status   2025 0618 8.9 (L) 12.0 - 15.9 g/dL Final     Hematocrit   Date/Time Value Ref Range Status   2025 0618 28.2 (L) 34.0 - 46.6 % Final     Platelets   Date/Time Value Ref Range Status   2025 0618 205 140 - 450 10*3/mm3 Final        Assessment   Post-op Day #1 S/P TLH, BS, and cysto  Anemia  Nausea     Plan   Continue routine post-operative care  Ambulate  Pepcid BID  Scopalamine patch    Kassandra Briceño, [facility]  2025  09:25 EST

## 2025-02-28 NOTE — PROGRESS NOTES
Patient: Lana Beasley  Procedure(s):  TOTAL LAPAROSCOPIC HYSTERECTOMY, bilateral salpingectomy Cystoscopy  Anesthesia type: general    Patient location: [facility]  Last vitals:   Vitals:    02/28/25 0716   BP: 147/91   Pulse: 95   Resp: 16   Temp: 98.4 °F (36.9 °C)   SpO2: 100%     Level of consciousness: awake, alert, and oriented    Post-anesthesia pain: adequate analgesia  Airway patency: patent  Respiratory: unassisted, spontaneous ventilation, room air  Cardiovascular: stable and blood pressure at baseline  Hydration: euvolemic    Anesthetic complications: no  Pt sitting in bed.   at BS.  Pt doing well.  Having some nausea and vomiting.  Cannot hold down liquids.  Notified RN for IV meds.  Stated she would F/U.  No issues from anesthesia.  Walking without difficulty

## 2025-02-28 NOTE — PLAN OF CARE
Problem: Adult Inpatient Plan of Care  Goal: Plan of Care Review  Outcome: Progressing  Flowsheets (Taken 2/28/2025 0739)  Progress: improving  Outcome Evaluation: Pt ambulating all around unit and tolerating it well. Lots of belching noted. Intermittent nausea with vomitting continued throughout evening. IV fluids continued. Pt refused all pain meds throughout night. did take colace but threw up shorly after. Pendleton removed and pt spontaneously moving on own. Daughter at bedside  Plan of Care Reviewed With:   patient   child  Goal: Patient-Specific Goal (Individualized)  Outcome: Progressing  Goal: Absence of Hospital-Acquired Illness or Injury  Outcome: Progressing  Intervention: Identify and Manage Fall Risk  Recent Flowsheet Documentation  Taken 2/28/2025 0200 by Nissa Lambert RN  Safety Promotion/Fall Prevention: safety round/check completed  Taken 2/28/2025 0000 by Nissa Lambert RN  Safety Promotion/Fall Prevention: safety round/check completed  Taken 2/27/2025 2200 by Nissa Lambert RN  Safety Promotion/Fall Prevention: safety round/check completed  Taken 2/27/2025 2000 by Nissa Lambert RN  Safety Promotion/Fall Prevention: safety round/check completed  Intervention: Prevent and Manage VTE (Venous Thromboembolism) Risk  Recent Flowsheet Documentation  Taken 2/27/2025 2000 by Nissa Lambert RN  VTE Prevention/Management:   bilateral   SCDs (sequential compression devices) on  Goal: Optimal Comfort and Wellbeing  Outcome: Progressing  Intervention: Monitor Pain and Promote Comfort  Recent Flowsheet Documentation  Taken 2/28/2025 0000 by Nissa Lambert RN  Pain Management Interventions: no interventions per patient request  Taken 2/27/2025 2000 by Nissa Lambert RN  Pain Management Interventions: no interventions per patient request  Goal: Readiness for Transition of Care  Outcome: Progressing     Problem: Skin Injury Risk Increased  Goal: Skin Health and  Integrity  Outcome: Progressing  Intervention: Optimize Skin Protection  Recent Flowsheet Documentation  Taken 2/28/2025 0200 by Nissa Lambert, RN  Activity Management: activity encouraged  Taken 2/28/2025 0000 by Nissa Lambert, RN  Activity Management: ambulated in room  Taken 2/27/2025 2200 by Nissa Lambert, RN  Activity Management: ambulated outside room  Taken 2/27/2025 2000 by Nissa Lambert, RN  Activity Management: up ad bev   Goal Outcome Evaluation:  Plan of Care Reviewed With: patient, child        Progress: improving  Outcome Evaluation: Pt ambulating all around unit and tolerating it well. Lots of belching noted. Intermittent nausea with vomitting continued throughout evening. IV fluids continued. Pt refused all pain meds throughout night. did take colace but threw up shorly after. Pendleton removed and pt spontaneously moving on own. Daughter at bedside

## 2025-02-28 NOTE — DISCHARGE SUMMARY
KEVIN Braden Beasley  : 1980  MRN: [id number]  CSN: [id number]    Discharge Summary      Date of Admission: 2025   Date of Discharge: 2025   Discharge Diagnosis: Menorrhagia with irregular cycle   Procedures Performed: Procedure(s):  TOTAL LAPAROSCOPIC HYSTERECTOMY, bilateral salpingectomy Cystoscopy      Consults: none   Brief History: Patient is a 45 y.o.who presented to the hospital for definitive treatment of menorrhagia with irregular cycle. This was causing severe anemia. She had a fibroid uterus.   Hospital Course: On postop day # 1, she was started on Scopolamine patch and Pepcid for nausea. After these were started, she was able to eat and tolerate a regular diet. She is voiding without difficulty. She has ambulated in the hallway. She feels ready for discharge   Pending Studies: none   Condition at discharge: stable   Discharge Medications:    Your medication list        START taking these medications        Instructions Last Dose Given Next Dose Due   acetaminophen 500 MG tablet  Commonly known as: TYLENOL      Take 2 tablets by mouth Every 8 (Eight) Hours As Needed for Mild Pain.       docusate sodium 100 MG capsule  Commonly known as: Colace      Take 1 capsule by mouth 2 (Two) Times a Day As Needed for Constipation.       famotidine 20 MG tablet  Commonly known as: Pepcid      Take 1 tablet by mouth 2 (Two) Times a Day As Needed for Heartburn or Indigestion.       ibuprofen 800 MG tablet  Commonly known as: ADVIL,MOTRIN      Take 1 tablet by mouth Every 8 (Eight) Hours As Needed for Mild Pain.       ondansetron 4 MG tablet  Commonly known as: Zofran      Take 1 tablet by mouth Every 8 (Eight) Hours As Needed for Nausea or Vomiting.       oxyCODONE 5 MG immediate release tablet  Commonly known as: ROXICODONE      Take 1 tablet by mouth Every 6 (Six) Hours As Needed for Moderate Pain for up to 9 days.              CONTINUE taking these medications        Instructions Last  Dose Given Next Dose Due   omeprazole 20 MG capsule  Commonly known as: priLOSEC      Take 1 capsule by mouth Daily.              STOP taking these medications      medroxyPROGESTERone 10 MG tablet  Commonly known as: Provera                  Where to Get Your Medications        These medications were sent to [address] [address]      Hours: Monday to Friday 8 AM to 6 PM Phone: [phone]   acetaminophen 500 MG tablet  docusate sodium 100 MG capsule  famotidine 20 MG tablet  ibuprofen 800 MG tablet  ondansetron 4 MG tablet  oxyCODONE 5 MG immediate release tablet        Discharge Disposition: home   Follow-up: Future Appointments   Date Time Provider Department Center   3/6/2025 10:50 AM [facility]          Time spent on discharge: 30 minutes or less  This note has been electronically signed.    Kassandra Briceño, APRN  February 28, 2025

## 2025-03-07 ENCOUNTER — OFFICE VISIT (OUTPATIENT)
Dept: OBSTETRICS AND GYNECOLOGY | Facility: CLINIC | Age: 45
End: 2025-03-07
Payer: COMMERCIAL

## 2025-03-07 VITALS
HEIGHT: 64 IN | WEIGHT: 144 LBS | BODY MASS INDEX: 24.59 KG/M2 | DIASTOLIC BLOOD PRESSURE: 76 MMHG | SYSTOLIC BLOOD PRESSURE: 114 MMHG

## 2025-03-07 DIAGNOSIS — Z09 POSTOP CHECK: Primary | ICD-10-CM

## 2025-03-07 PROCEDURE — 1160F RVW MEDS BY RX/DR IN RCRD: CPT | Performed by: MIDWIFE

## 2025-03-07 PROCEDURE — 1159F MED LIST DOCD IN RCRD: CPT | Performed by: MIDWIFE

## 2025-03-07 PROCEDURE — 99024 POSTOP FOLLOW-UP VISIT: CPT | Performed by: MIDWIFE

## 2025-03-07 NOTE — PROGRESS NOTES
"Subjective   Chief Complaint   Patient presents with    Post-op Follow-up     1 week post op w/ no complaints/concerns      Lana Beasley is a 45 y.o. year old  presenting to be seen for her post-operative visit.  Currently she reports no problems with eating, bowel movements, voiding, or wound drainage and pain is well controlled.    The pathology results from her procedure are in [facility] record and are benign.      OTHER COMPLAINTS:  Nothing else       Objective   /76   Ht 162.6 cm (64\")   Wt 65.3 kg (144 lb)   LMP 2025 Comment: current  BMI 24.72 kg/m²     General:  well developed; well nourished  no acute distress  mentation appropriate   Abdomen: soft, non-tender; no masses  incision is healing and x 3, glue intact   Pelvis: Clinical staff was present for exam          Assessment   S/P TLH, bilateral salpingectomy, and cystoscopy     Plan   Nothing per vagina still until 6 weeks after her procedure  Follow up 5 weeks with Dr Hackett    No orders of the defined types were placed in this encounter.         This note was electronically signed.  Kassandra Briceño, APRN  2025  "

## 2025-04-11 ENCOUNTER — OFFICE VISIT (OUTPATIENT)
Dept: OBSTETRICS AND GYNECOLOGY | Facility: CLINIC | Age: 45
End: 2025-04-11
Payer: COMMERCIAL

## 2025-04-11 VITALS
DIASTOLIC BLOOD PRESSURE: 80 MMHG | HEIGHT: 64 IN | WEIGHT: 144.2 LBS | SYSTOLIC BLOOD PRESSURE: 110 MMHG | BODY MASS INDEX: 24.62 KG/M2

## 2025-04-11 DIAGNOSIS — Z09 POSTOP CHECK: Primary | ICD-10-CM

## 2025-04-11 PROCEDURE — 99024 POSTOP FOLLOW-UP VISIT: CPT | Performed by: OBSTETRICS & GYNECOLOGY

## 2025-04-11 RX ORDER — OMEPRAZOLE 40 MG/1
CAPSULE, DELAYED RELEASE ORAL
COMMUNITY
Start: 2025-04-10

## 2025-04-11 NOTE — PROGRESS NOTES
Subjective   Chief Complaint   Patient presents with    Post-op     6 Weeks post op Total Laparoscopic Hysterectomy, Bilateral salpingectomy, with Cysto      Lana BULLOCK Ramos is a 45 y.o. year old .  No LMP recorded.  She presents to be seen because of 6 weeks status post total laparoscopic hysterectomy for fibroid uterus with menorrhagia and resultant anemia..     OTHER COMPLAINTS:  Nothing else    The following portions of the patient's history were reviewed and updated as appropriate:She  has a past medical history of Anemia, Body piercing, COVID-19 vaccine series completed, GERD (gastroesophageal reflux disease), History of blood transfusion, Iron deficiency, and Wears glasses.  She does not have any pertinent problems on file.  She  has a past surgical history that includes Esophagogastroduodenoscopy (N/A, 2020); Colonoscopy (N/A, 3/5/2021); d & c hysteroscopy (N/A, 2021); Dilation and curettage of uterus; Hysteroscopy; and total laparoscopic hysterectomy (N/A, 2025).  Her family history includes No Known Problems in her father and mother.  She  reports that she has never smoked. She has never used smokeless tobacco. She reports that she does not drink alcohol and does not use drugs.  Current Outpatient Medications   Medication Sig Dispense Refill    acetaminophen (TYLENOL) 500 MG tablet Take 2 tablets by mouth Every 8 (Eight) Hours As Needed for Mild Pain. 60 tablet 0    docusate sodium (Colace) 100 MG capsule Take 1 capsule by mouth 2 (Two) Times a Day As Needed for Constipation. 30 capsule 0    ibuprofen (ADVIL,MOTRIN) 800 MG tablet Take 1 tablet by mouth Every 8 (Eight) Hours As Needed for Mild Pain. 60 tablet 0    omeprazole (priLOSEC) 40 MG capsule       ondansetron (Zofran) 4 MG tablet Take 1 tablet by mouth Every 8 (Eight) Hours As Needed for Nausea or Vomiting. 30 tablet 0     No current facility-administered medications for this visit.     Current Outpatient Medications on File  "Prior to Visit   Medication Sig    acetaminophen (TYLENOL) 500 MG tablet Take 2 tablets by mouth Every 8 (Eight) Hours As Needed for Mild Pain.    docusate sodium (Colace) 100 MG capsule Take 1 capsule by mouth 2 (Two) Times a Day As Needed for Constipation.    ibuprofen (ADVIL,MOTRIN) 800 MG tablet Take 1 tablet by mouth Every 8 (Eight) Hours As Needed for Mild Pain.    omeprazole (priLOSEC) 40 MG capsule     ondansetron (Zofran) 4 MG tablet Take 1 tablet by mouth Every 8 (Eight) Hours As Needed for Nausea or Vomiting.     No current facility-administered medications on file prior to visit.     She has no known allergies.    Social History    Tobacco Use      Smoking status: Never      Smokeless tobacco: Never    Review of Systems  Consitutional POS: nothing reported    NEG: anorexia or night sweats   Gastointestinal POS: nothing reported    NEG: bloating, change in bowel habits, melena, or reflux symptoms   Genitourinary POS: nothing reported    NEG: dysuria or hematuria   Integument POS: nothing reported    NEG: moles that are changing in size, shape, color or rashes   Breast POS: nothing reported    NEG: persistent breast lump, skin dimpling, or nipple discharge         Respiratory: negative  Cardiovascular: negative  GYN:  negative          Objective   /80   Ht 162.6 cm (64\")   Wt 65.4 kg (144 lb 3.2 oz)   BMI 24.75 kg/m²     General:  well developed; well nourished  no acute distress  mentation appropriate   Skin:  No suspicious lesions seen   Thyroid: normal to inspection and palpation   Lungs:  breathing is unlabored  clear to auscultation bilaterally   Heart:  regular rate and rhythm, S1, S2 normal, no murmur, click, rub or gallop   Breasts:  Not performed.   Abdomen: soft, non-tender; no masses  no umbilical or inguinal hernias are present  no hepato-splenomegaly   Pelvis: Clinical staff was present for exam  External genitalia:  normal appearance of the external genitalia including Bartholin's " and [place]'s glands.  :  urethral meatus normal; urethra normal:  Vaginal:  normal pink mucosa without prolapse or lesions.  Cervix:  absent.  Uterus:  absent.  Adnexa:  normal bimanual exam of the adnexa.  Rectal:  digital rectal exam not performed; anus visually normal appearing.     Psychiatric: Alert and oriented ×3, mood and affect appropriate  HEENT: Atraumatic, normocephalic, normal scleral icterus  Extremities: 2+ pulses bilaterally, no edema      Lab Review   CBC    Imaging   No data reviewed        Assessment   Post po cehck     Plan   Cbc today, incisions healed, doing well  CT for 6 more weeks    No orders of the defined types were placed in this encounter.         This note was electronically signed.      April 11, 2025

## 2025-04-12 LAB
BASOPHILS # BLD AUTO: 0.02 10*3/MM3 (ref 0–0.2)
BASOPHILS NFR BLD AUTO: 0.6 % (ref 0–1.5)
EOSINOPHIL # BLD AUTO: 0.06 10*3/MM3 (ref 0–0.4)
EOSINOPHIL NFR BLD AUTO: 1.7 % (ref 0.3–6.2)
ERYTHROCYTE [DISTWIDTH] IN BLOOD BY AUTOMATED COUNT: 13.6 % (ref 12.3–15.4)
HCT VFR BLD AUTO: 28.3 % (ref 34–46.6)
HGB BLD-MCNC: 8.8 G/DL (ref 12–15.9)
IMM GRANULOCYTES # BLD AUTO: 0.01 10*3/MM3 (ref 0–0.05)
IMM GRANULOCYTES NFR BLD AUTO: 0.3 % (ref 0–0.5)
LYMPHOCYTES # BLD AUTO: 1 10*3/MM3 (ref 0.7–3.1)
LYMPHOCYTES NFR BLD AUTO: 27.8 % (ref 19.6–45.3)
MCH RBC QN AUTO: 24.2 PG (ref 26.6–33)
MCHC RBC AUTO-ENTMCNC: 31.1 G/DL (ref 31.5–35.7)
MCV RBC AUTO: 78 FL (ref 79–97)
MONOCYTES # BLD AUTO: 0.28 10*3/MM3 (ref 0.1–0.9)
MONOCYTES NFR BLD AUTO: 7.8 % (ref 5–12)
NEUTROPHILS # BLD AUTO: 2.23 10*3/MM3 (ref 1.7–7)
NEUTROPHILS NFR BLD AUTO: 61.8 % (ref 42.7–76)
NRBC BLD AUTO-RTO: 0 /100 WBC (ref 0–0.2)
PLATELET # BLD AUTO: 261 10*3/MM3 (ref 140–450)
RBC # BLD AUTO: 3.63 10*6/MM3 (ref 3.77–5.28)
WBC # BLD AUTO: 3.6 10*3/MM3 (ref 3.4–10.8)

## 2025-04-16 DIAGNOSIS — D50.0 IRON DEFICIENCY ANEMIA SECONDARY TO BLOOD LOSS (CHRONIC): Primary | ICD-10-CM

## 2025-05-28 ENCOUNTER — LAB (OUTPATIENT)
Dept: LAB | Facility: HOSPITAL | Age: 45
End: 2025-05-28
Payer: COMMERCIAL

## 2025-05-28 ENCOUNTER — OFFICE VISIT (OUTPATIENT)
Dept: ONCOLOGY | Facility: CLINIC | Age: 45
End: 2025-05-28
Payer: COMMERCIAL

## 2025-05-28 VITALS
TEMPERATURE: 98.6 F | RESPIRATION RATE: 16 BRPM | SYSTOLIC BLOOD PRESSURE: 126 MMHG | HEART RATE: 102 BPM | OXYGEN SATURATION: 100 % | DIASTOLIC BLOOD PRESSURE: 88 MMHG | BODY MASS INDEX: 24.75 KG/M2 | WEIGHT: 145 LBS | HEIGHT: 64 IN

## 2025-05-28 DIAGNOSIS — D50.0 IRON DEFICIENCY ANEMIA DUE TO CHRONIC BLOOD LOSS: Primary | ICD-10-CM

## 2025-05-28 DIAGNOSIS — D50.0 IRON DEFICIENCY ANEMIA DUE TO CHRONIC BLOOD LOSS: ICD-10-CM

## 2025-05-28 LAB
ANISOCYTOSIS BLD QL: NORMAL
BASOPHILS # BLD AUTO: 0.03 10*3/MM3 (ref 0–0.2)
BASOPHILS NFR BLD AUTO: 0.8 % (ref 0–1.5)
DEPRECATED RDW RBC AUTO: 38.8 FL (ref 37–54)
EOSINOPHIL # BLD AUTO: 0.07 10*3/MM3 (ref 0–0.4)
EOSINOPHIL NFR BLD AUTO: 1.8 % (ref 0.3–6.2)
ERYTHROCYTE [DISTWIDTH] IN BLOOD BY AUTOMATED COUNT: 14.6 % (ref 12.3–15.4)
FERRITIN SERPL-MCNC: 5.91 NG/ML (ref 13–150)
HCT VFR BLD AUTO: 30.3 % (ref 34–46.6)
HGB BLD-MCNC: 9 G/DL (ref 12–15.9)
HYPOCHROMIA BLD QL: NORMAL
IMM GRANULOCYTES # BLD AUTO: 0.01 10*3/MM3 (ref 0–0.05)
IMM GRANULOCYTES NFR BLD AUTO: 0.3 % (ref 0–0.5)
IRON 24H UR-MRATE: 23 MCG/DL (ref 37–145)
IRON SATN MFR SERPL: 4 % (ref 20–50)
LYMPHOCYTES # BLD AUTO: 1.01 10*3/MM3 (ref 0.7–3.1)
LYMPHOCYTES NFR BLD AUTO: 25.3 % (ref 19.6–45.3)
MCH RBC QN AUTO: 21.8 PG (ref 26.6–33)
MCHC RBC AUTO-ENTMCNC: 29.7 G/DL (ref 31.5–35.7)
MCV RBC AUTO: 73.4 FL (ref 79–97)
MONOCYTES # BLD AUTO: 0.27 10*3/MM3 (ref 0.1–0.9)
MONOCYTES NFR BLD AUTO: 6.8 % (ref 5–12)
NEUTROPHILS NFR BLD AUTO: 2.61 10*3/MM3 (ref 1.7–7)
NEUTROPHILS NFR BLD AUTO: 65 % (ref 42.7–76)
NRBC BLD AUTO-RTO: 0 /100 WBC (ref 0–0.2)
PLATELET # BLD AUTO: 200 10*3/MM3 (ref 140–450)
PMV BLD AUTO: 10.7 FL (ref 6–12)
POLYCHROMASIA BLD QL SMEAR: NORMAL
RBC # BLD AUTO: 4.13 10*6/MM3 (ref 3.77–5.28)
SMALL PLATELETS BLD QL SMEAR: ADEQUATE
TIBC SERPL-MCNC: 545 MCG/DL (ref 298–536)
TRANSFERRIN SERPL-MCNC: 366 MG/DL (ref 200–360)
WBC MORPH BLD: NORMAL
WBC NRBC COR # BLD AUTO: 4 10*3/MM3 (ref 3.4–10.8)

## 2025-05-28 PROCEDURE — 83540 ASSAY OF IRON: CPT

## 2025-05-28 PROCEDURE — 82746 ASSAY OF FOLIC ACID SERUM: CPT

## 2025-05-28 PROCEDURE — 84466 ASSAY OF TRANSFERRIN: CPT

## 2025-05-28 PROCEDURE — 82728 ASSAY OF FERRITIN: CPT

## 2025-05-28 PROCEDURE — 85007 BL SMEAR W/DIFF WBC COUNT: CPT

## 2025-05-28 PROCEDURE — 82607 VITAMIN B-12: CPT

## 2025-05-28 PROCEDURE — 85025 COMPLETE CBC W/AUTO DIFF WBC: CPT

## 2025-05-28 PROCEDURE — 36415 COLL VENOUS BLD VENIPUNCTURE: CPT

## 2025-05-28 NOTE — PROGRESS NOTES
DATE OF VISIT: 5/28/2025    REASON FOR VISIT: Followup for normocytic anemia     PROBLEM LIST:  1.  Normocytic anemia  2.  Iron deficiency:  A.  Negative GI evaluation EGD December 11, 2020 and colonoscopy March 5, 2020  3.  Polymenorrhagia  A.  Improved after endometrial ablation done August 25, 2021  B.  Pathology report revealed no evidence of malignancy  C.  Worsening on 5/26/2022.  Considering partial hysterectomy versus total hysterectomy  4.  Heartburn    HISTORY OF PRESENT ILLNESS: The patient is a very pleasant 45 y.o. female  with past medical history significant for normocytic anemia diagnosed May 2020.  Iron deficiency anemia noted 5/18/2021.  She had IV iron at that time.  Improved anemia.  The patient is here today for follow-up.     SUBJECTIVE: Ms. Beasley is here today with her daughter.  She is complaining of fatigue.    Past History:  Medical History: has a past medical history of Anemia, Body piercing, COVID-19 vaccine series completed, GERD (gastroesophageal reflux disease), History of blood transfusion, Iron deficiency, and Wears glasses.   Surgical History: has a past surgical history that includes Esophagogastroduodenoscopy (N/A, 12/11/2020); Colonoscopy (N/A, 3/5/2021); d & c hysteroscopy (N/A, 8/25/2021); Dilation and curettage of uterus; Hysteroscopy; and total laparoscopic hysterectomy (N/A, 2/27/2025).   Family History: family history includes No Known Problems in her father and mother.   Social History: reports that she has never smoked. She has never used smokeless tobacco. She reports that she does not drink alcohol and does not use drugs.    (Not in a hospital admission)     Allergies: Patient has no known allergies.     Review of Systems   Constitutional:  Positive for fatigue.   Endocrine: Positive for heat intolerance.   Genitourinary:  Positive for menstrual problem.   Musculoskeletal:  Positive for arthralgias.   All other systems reviewed and are negative.      PHYSICAL  "EXAMINATION:   /88   Pulse 102   Temp 98.6 °F (37 °C) (Temporal)   Resp 16   Ht 162.6 cm (64.02\")   Wt 65.8 kg (145 lb)   SpO2 100%   BMI 24.88 kg/m²    Pain Score    05/28/25 1411   PainSc: 0-No pain          ECOG score: 0            ECOG Performance Status: 1 - Symptomatic but completely ambulatory      General Appearance:      alert, cooperative, no apparent distress and appears stated age   Lungs:   Clear to auscultation bilaterally; respirations regular, even, and unlabored bilaterally   Heart:  Regular rate and rhythm, no murmurs appreciated   Abdomen:                    No visits with results within 2 Week(s) from this visit.   Latest known visit with results is:   Office Visit on 04/11/2025   Component Date Value Ref Range Status    WBC 04/11/2025 3.60  3.40 - 10.80 10*3/mm3 Final    RBC 04/11/2025 3.63 (L)  3.77 - 5.28 10*6/mm3 Final    Hemoglobin 04/11/2025 8.8 (L)  12.0 - 15.9 g/dL Final    Hematocrit 04/11/2025 28.3 (L)  34.0 - 46.6 % Final    MCV 04/11/2025 78.0 (L)  79.0 - 97.0 fL Final    MCH 04/11/2025 24.2 (L)  26.6 - 33.0 pg Final    MCHC 04/11/2025 31.1 (L)  31.5 - 35.7 g/dL Final    RDW 04/11/2025 13.6  12.3 - 15.4 % Final    Platelets 04/11/2025 261  140 - 450 10*3/mm3 Final    Neutrophil Rel % 04/11/2025 61.8  42.7 - 76.0 % Final    Lymphocyte Rel % 04/11/2025 27.8  19.6 - 45.3 % Final    Monocyte Rel % 04/11/2025 7.8  5.0 - 12.0 % Final    Eosinophil Rel % 04/11/2025 1.7  0.3 - 6.2 % Final    Basophil Rel % 04/11/2025 0.6  0.0 - 1.5 % Final    Neutrophils Absolute 04/11/2025 2.23  1.70 - 7.00 10*3/mm3 Final    Lymphocytes Absolute 04/11/2025 1.00  0.70 - 3.10 10*3/mm3 Final    Monocytes Absolute 04/11/2025 0.28  0.10 - 0.90 10*3/mm3 Final    Eosinophils Absolute 04/11/2025 0.06  0.00 - 0.40 10*3/mm3 Final    Basophils Absolute 04/11/2025 0.02  0.00 - 0.20 10*3/mm3 Final    Immature Granulocyte Rel % 04/11/2025 0.3  0.0 - 0.5 % Final    Immature Grans Absolute 04/11/2025 0.01  0.00 " - 0.05 10*3/mm3 Final    nRBC 04/11/2025 0.0  0.0 - 0.2 /100 WBC Final        No results found.    ASSESSMENT: The patient is a very pleasant 45 y.o. female  with normocytic anemia, iron deficiency anemia, and vitamin B-12 deficiency      PLAN:    1.  Normocytic anemia  A.  I did go over the blood results with the patient from April 11, 2025 hemoglobin dropped to 8.8 MCV down to 78.    2.  Iron deficiency anemia.  A.  No recent iron studies.  We will check blood work today and I will call her with results.  B.  If ferritin level is less than 30 or iron saturations less than 10 we will plan to repeat IV iron  C.  I explained the patient that she was iron deficient back in October 2022 with ferritin of 10 and saturation 19%  B.  I discussed with the patient and her daughter that we will continue iron studies for now whether she moves forward with total hysterectomy versus holding off on any surgery for now.    3.  Vitamin B12 deficiency  A.  At this point I recommend to continue monthly vitamin B12 replacement indefinitely.    4.  Polymenorrhagia:  A.  Status post hysterectomy February 27, 2025.    FOLLOW UP: 3 months with repeat labs    Kennedy Yanes MD  5/28/2025

## 2025-05-29 LAB
FOLATE SERPL-MCNC: 8.31 NG/ML (ref 4.78–24.2)
VIT B12 BLD-MCNC: 227 PG/ML (ref 211–946)

## 2025-05-29 RX ORDER — CETIRIZINE HYDROCHLORIDE 10 MG/1
10 TABLET ORAL DAILY
OUTPATIENT
Start: 2025-07-09

## 2025-05-29 RX ORDER — CYANOCOBALAMIN 1000 UG/ML
1000 INJECTION, SOLUTION INTRAMUSCULAR; SUBCUTANEOUS ONCE
Start: 2025-07-02 | End: 2025-07-02

## 2025-05-29 RX ORDER — FAMOTIDINE 10 MG/ML
20 INJECTION, SOLUTION INTRAVENOUS AS NEEDED
OUTPATIENT
Start: 2025-07-02

## 2025-05-29 RX ORDER — CETIRIZINE HYDROCHLORIDE 10 MG/1
10 TABLET ORAL DAILY
OUTPATIENT
Start: 2025-06-25

## 2025-05-29 RX ORDER — FAMOTIDINE 10 MG/ML
20 INJECTION, SOLUTION INTRAVENOUS ONCE
OUTPATIENT
Start: 2025-07-09

## 2025-05-29 RX ORDER — FAMOTIDINE 10 MG/ML
20 INJECTION, SOLUTION INTRAVENOUS ONCE
OUTPATIENT
Start: 2025-06-25

## 2025-05-29 RX ORDER — CYANOCOBALAMIN 1000 UG/ML
1000 INJECTION, SOLUTION INTRAMUSCULAR; SUBCUTANEOUS ONCE
Start: 2025-06-25 | End: 2025-06-25

## 2025-05-29 RX ORDER — DIPHENHYDRAMINE HYDROCHLORIDE 50 MG/ML
50 INJECTION, SOLUTION INTRAMUSCULAR; INTRAVENOUS AS NEEDED
OUTPATIENT
Start: 2025-07-02

## 2025-05-29 RX ORDER — FAMOTIDINE 10 MG/ML
20 INJECTION, SOLUTION INTRAVENOUS AS NEEDED
OUTPATIENT
Start: 2025-06-25

## 2025-05-29 RX ORDER — FAMOTIDINE 10 MG/ML
20 INJECTION, SOLUTION INTRAVENOUS AS NEEDED
OUTPATIENT
Start: 2025-06-18

## 2025-05-29 RX ORDER — DIPHENHYDRAMINE HYDROCHLORIDE 50 MG/ML
50 INJECTION, SOLUTION INTRAMUSCULAR; INTRAVENOUS AS NEEDED
OUTPATIENT
Start: 2025-06-18

## 2025-05-29 RX ORDER — DIPHENHYDRAMINE HYDROCHLORIDE 50 MG/ML
50 INJECTION, SOLUTION INTRAMUSCULAR; INTRAVENOUS AS NEEDED
OUTPATIENT
Start: 2025-06-11

## 2025-05-29 RX ORDER — SODIUM CHLORIDE 9 MG/ML
20 INJECTION, SOLUTION INTRAVENOUS ONCE
OUTPATIENT
Start: 2025-07-09

## 2025-05-29 RX ORDER — SODIUM CHLORIDE 9 MG/ML
20 INJECTION, SOLUTION INTRAVENOUS ONCE
OUTPATIENT
Start: 2025-07-02

## 2025-05-29 RX ORDER — HYDROCORTISONE SODIUM SUCCINATE 100 MG/2ML
100 INJECTION INTRAMUSCULAR; INTRAVENOUS AS NEEDED
OUTPATIENT
Start: 2025-07-09

## 2025-05-29 RX ORDER — ACETAMINOPHEN 325 MG/1
650 TABLET ORAL ONCE
OUTPATIENT
Start: 2025-06-25

## 2025-05-29 RX ORDER — SODIUM CHLORIDE 9 MG/ML
20 INJECTION, SOLUTION INTRAVENOUS ONCE
OUTPATIENT
Start: 2025-06-25

## 2025-05-29 RX ORDER — CETIRIZINE HYDROCHLORIDE 10 MG/1
10 TABLET ORAL DAILY
OUTPATIENT
Start: 2025-06-11

## 2025-05-29 RX ORDER — SODIUM CHLORIDE 9 MG/ML
20 INJECTION, SOLUTION INTRAVENOUS ONCE
OUTPATIENT
Start: 2025-06-18

## 2025-05-29 RX ORDER — CETIRIZINE HYDROCHLORIDE 10 MG/1
10 TABLET ORAL DAILY
OUTPATIENT
Start: 2025-07-02

## 2025-05-29 RX ORDER — CYANOCOBALAMIN 1000 UG/ML
1000 INJECTION, SOLUTION INTRAMUSCULAR; SUBCUTANEOUS ONCE
Start: 2025-07-09 | End: 2025-07-09

## 2025-05-29 RX ORDER — CYANOCOBALAMIN 1000 UG/ML
1000 INJECTION, SOLUTION INTRAMUSCULAR; SUBCUTANEOUS ONCE
Start: 2025-06-18 | End: 2025-06-18

## 2025-05-29 RX ORDER — HYDROCORTISONE SODIUM SUCCINATE 100 MG/2ML
100 INJECTION INTRAMUSCULAR; INTRAVENOUS AS NEEDED
OUTPATIENT
Start: 2025-06-18

## 2025-05-29 RX ORDER — SODIUM CHLORIDE 9 MG/ML
20 INJECTION, SOLUTION INTRAVENOUS ONCE
OUTPATIENT
Start: 2025-06-11

## 2025-05-29 RX ORDER — ACETAMINOPHEN 325 MG/1
650 TABLET ORAL ONCE
OUTPATIENT
Start: 2025-06-18

## 2025-05-29 RX ORDER — FAMOTIDINE 10 MG/ML
20 INJECTION, SOLUTION INTRAVENOUS ONCE
OUTPATIENT
Start: 2025-06-18

## 2025-05-29 RX ORDER — ACETAMINOPHEN 325 MG/1
650 TABLET ORAL ONCE
OUTPATIENT
Start: 2025-06-11

## 2025-05-29 RX ORDER — FAMOTIDINE 10 MG/ML
20 INJECTION, SOLUTION INTRAVENOUS AS NEEDED
OUTPATIENT
Start: 2025-06-11

## 2025-05-29 RX ORDER — FAMOTIDINE 10 MG/ML
20 INJECTION, SOLUTION INTRAVENOUS AS NEEDED
OUTPATIENT
Start: 2025-07-09

## 2025-05-29 RX ORDER — ACETAMINOPHEN 325 MG/1
650 TABLET ORAL ONCE
OUTPATIENT
Start: 2025-07-02

## 2025-05-29 RX ORDER — CYANOCOBALAMIN 1000 UG/ML
1000 INJECTION, SOLUTION INTRAMUSCULAR; SUBCUTANEOUS ONCE
Start: 2025-06-11 | End: 2025-06-11

## 2025-05-29 RX ORDER — HYDROCORTISONE SODIUM SUCCINATE 100 MG/2ML
100 INJECTION INTRAMUSCULAR; INTRAVENOUS AS NEEDED
OUTPATIENT
Start: 2025-06-11

## 2025-05-29 RX ORDER — HYDROCORTISONE SODIUM SUCCINATE 100 MG/2ML
100 INJECTION INTRAMUSCULAR; INTRAVENOUS AS NEEDED
OUTPATIENT
Start: 2025-06-25

## 2025-05-29 RX ORDER — FAMOTIDINE 10 MG/ML
20 INJECTION, SOLUTION INTRAVENOUS ONCE
OUTPATIENT
Start: 2025-07-02

## 2025-05-29 RX ORDER — CETIRIZINE HYDROCHLORIDE 10 MG/1
10 TABLET ORAL DAILY
OUTPATIENT
Start: 2025-06-18

## 2025-05-29 RX ORDER — DIPHENHYDRAMINE HYDROCHLORIDE 50 MG/ML
50 INJECTION, SOLUTION INTRAMUSCULAR; INTRAVENOUS AS NEEDED
OUTPATIENT
Start: 2025-07-09

## 2025-05-29 RX ORDER — FAMOTIDINE 10 MG/ML
20 INJECTION, SOLUTION INTRAVENOUS ONCE
OUTPATIENT
Start: 2025-06-11

## 2025-05-29 RX ORDER — DIPHENHYDRAMINE HYDROCHLORIDE 50 MG/ML
50 INJECTION, SOLUTION INTRAMUSCULAR; INTRAVENOUS AS NEEDED
OUTPATIENT
Start: 2025-06-25

## 2025-05-29 RX ORDER — ACETAMINOPHEN 325 MG/1
650 TABLET ORAL ONCE
OUTPATIENT
Start: 2025-07-09

## 2025-05-29 RX ORDER — HYDROCORTISONE SODIUM SUCCINATE 100 MG/2ML
100 INJECTION INTRAMUSCULAR; INTRAVENOUS AS NEEDED
OUTPATIENT
Start: 2025-07-02

## 2025-06-04 ENCOUNTER — HOSPITAL ENCOUNTER (OUTPATIENT)
Facility: HOSPITAL | Age: 45
Discharge: HOME | End: 2025-06-04
Admitting: INTERNAL MEDICINE
Payer: COMMERCIAL

## 2025-06-04 VITALS
SYSTOLIC BLOOD PRESSURE: 129 MMHG | RESPIRATION RATE: 18 BRPM | HEART RATE: 92 BPM | DIASTOLIC BLOOD PRESSURE: 89 MMHG | OXYGEN SATURATION: 100 % | TEMPERATURE: 97.2 F

## 2025-06-04 DIAGNOSIS — K90.9 IRON MALABSORPTION: ICD-10-CM

## 2025-06-04 DIAGNOSIS — D50.0 IRON DEFICIENCY ANEMIA DUE TO CHRONIC BLOOD LOSS: Primary | ICD-10-CM

## 2025-06-04 PROCEDURE — 96365 THER/PROPH/DIAG IV INF INIT: CPT

## 2025-06-04 PROCEDURE — 96372 THER/PROPH/DIAG INJ SC/IM: CPT

## 2025-06-04 PROCEDURE — 25010000002 NA FERRIC GLUC CPLX PER 12.5 MG: Performed by: INTERNAL MEDICINE

## 2025-06-04 PROCEDURE — 96374 THER/PROPH/DIAG INJ IV PUSH: CPT

## 2025-06-04 PROCEDURE — 25010000002 CYANOCOBALAMIN PER 1000 MCG: Performed by: INTERNAL MEDICINE

## 2025-06-04 PROCEDURE — 25010000002 FAMOTIDINE 10 MG/ML SOLUTION: Performed by: INTERNAL MEDICINE

## 2025-06-04 PROCEDURE — 96375 TX/PRO/DX INJ NEW DRUG ADDON: CPT

## 2025-06-04 RX ORDER — ACETAMINOPHEN 325 MG/1
650 TABLET ORAL ONCE
Status: COMPLETED | OUTPATIENT
Start: 2025-06-04 | End: 2025-06-04

## 2025-06-04 RX ORDER — HYDROCORTISONE SODIUM SUCCINATE 100 MG/2ML
100 INJECTION INTRAMUSCULAR; INTRAVENOUS AS NEEDED
Status: DISCONTINUED | OUTPATIENT
Start: 2025-06-04 | End: 2025-06-05 | Stop reason: HOSPADM

## 2025-06-04 RX ORDER — FAMOTIDINE 10 MG/ML
20 INJECTION, SOLUTION INTRAVENOUS ONCE
Status: COMPLETED | OUTPATIENT
Start: 2025-06-04 | End: 2025-06-04

## 2025-06-04 RX ORDER — CETIRIZINE HYDROCHLORIDE 10 MG/1
10 TABLET ORAL DAILY
Status: DISCONTINUED | OUTPATIENT
Start: 2025-06-04 | End: 2025-06-05 | Stop reason: HOSPADM

## 2025-06-04 RX ORDER — FAMOTIDINE 10 MG/ML
20 INJECTION, SOLUTION INTRAVENOUS AS NEEDED
Status: DISCONTINUED | OUTPATIENT
Start: 2025-06-04 | End: 2025-06-05 | Stop reason: HOSPADM

## 2025-06-04 RX ORDER — SODIUM CHLORIDE 9 MG/ML
20 INJECTION, SOLUTION INTRAVENOUS ONCE
Status: DISCONTINUED | OUTPATIENT
Start: 2025-06-04 | End: 2025-06-05 | Stop reason: HOSPADM

## 2025-06-04 RX ORDER — CYANOCOBALAMIN 1000 UG/ML
1000 INJECTION, SOLUTION INTRAMUSCULAR; SUBCUTANEOUS ONCE
Status: COMPLETED | OUTPATIENT
Start: 2025-06-04 | End: 2025-06-04

## 2025-06-04 RX ORDER — DIPHENHYDRAMINE HYDROCHLORIDE 50 MG/ML
50 INJECTION, SOLUTION INTRAMUSCULAR; INTRAVENOUS AS NEEDED
Status: DISCONTINUED | OUTPATIENT
Start: 2025-06-04 | End: 2025-06-05 | Stop reason: HOSPADM

## 2025-06-04 RX ADMIN — SODIUM CHLORIDE 125 MG: 9 INJECTION, SOLUTION INTRAVENOUS at 15:52

## 2025-06-04 RX ADMIN — CETIRIZINE HYDROCHLORIDE 10 MG: 10 TABLET, FILM COATED ORAL at 15:00

## 2025-06-04 RX ADMIN — ACETAMINOPHEN 650 MG: 325 TABLET, FILM COATED ORAL at 15:00

## 2025-06-04 RX ADMIN — FAMOTIDINE 20 MG: 10 INJECTION, SOLUTION INTRAVENOUS at 15:47

## 2025-06-04 RX ADMIN — CYANOCOBALAMIN 1000 MCG: 1000 INJECTION, SOLUTION INTRAMUSCULAR at 15:52

## 2025-06-12 ENCOUNTER — HOSPITAL ENCOUNTER (OUTPATIENT)
Facility: HOSPITAL | Age: 45
Discharge: HOME | End: 2025-06-12
Admitting: INTERNAL MEDICINE
Payer: COMMERCIAL

## 2025-06-12 VITALS
SYSTOLIC BLOOD PRESSURE: 131 MMHG | DIASTOLIC BLOOD PRESSURE: 89 MMHG | RESPIRATION RATE: 16 BRPM | HEART RATE: 94 BPM | TEMPERATURE: 98.4 F

## 2025-06-12 DIAGNOSIS — K90.9 IRON MALABSORPTION: ICD-10-CM

## 2025-06-12 DIAGNOSIS — D50.0 IRON DEFICIENCY ANEMIA DUE TO CHRONIC BLOOD LOSS: Primary | ICD-10-CM

## 2025-06-12 PROCEDURE — 25010000002 CYANOCOBALAMIN PER 1000 MCG: Performed by: INTERNAL MEDICINE

## 2025-06-12 PROCEDURE — 96365 THER/PROPH/DIAG IV INF INIT: CPT

## 2025-06-12 PROCEDURE — 25010000002 FAMOTIDINE 10 MG/ML SOLUTION: Performed by: INTERNAL MEDICINE

## 2025-06-12 PROCEDURE — 96375 TX/PRO/DX INJ NEW DRUG ADDON: CPT

## 2025-06-12 PROCEDURE — 96372 THER/PROPH/DIAG INJ SC/IM: CPT

## 2025-06-12 PROCEDURE — 25010000002 NA FERRIC GLUC CPLX PER 12.5 MG: Performed by: INTERNAL MEDICINE

## 2025-06-12 RX ORDER — FAMOTIDINE 10 MG/ML
20 INJECTION, SOLUTION INTRAVENOUS ONCE
Status: COMPLETED | OUTPATIENT
Start: 2025-06-12 | End: 2025-06-12

## 2025-06-12 RX ORDER — SODIUM CHLORIDE 9 MG/ML
20 INJECTION, SOLUTION INTRAVENOUS ONCE
Status: DISCONTINUED | OUTPATIENT
Start: 2025-06-12 | End: 2025-06-13 | Stop reason: HOSPADM

## 2025-06-12 RX ORDER — ACETAMINOPHEN 325 MG/1
650 TABLET ORAL ONCE
Status: COMPLETED | OUTPATIENT
Start: 2025-06-12 | End: 2025-06-12

## 2025-06-12 RX ORDER — CETIRIZINE HYDROCHLORIDE 10 MG/1
10 TABLET ORAL DAILY
Status: DISCONTINUED | OUTPATIENT
Start: 2025-06-12 | End: 2025-06-13 | Stop reason: HOSPADM

## 2025-06-12 RX ORDER — CYANOCOBALAMIN 1000 UG/ML
1000 INJECTION, SOLUTION INTRAMUSCULAR; SUBCUTANEOUS ONCE
Status: COMPLETED | OUTPATIENT
Start: 2025-06-12 | End: 2025-06-12

## 2025-06-12 RX ADMIN — FAMOTIDINE 20 MG: 10 INJECTION, SOLUTION INTRAVENOUS at 15:05

## 2025-06-12 RX ADMIN — SODIUM CHLORIDE 125 MG: 9 INJECTION, SOLUTION INTRAVENOUS at 15:15

## 2025-06-12 RX ADMIN — CYANOCOBALAMIN 1000 MCG: 1000 INJECTION, SOLUTION INTRAMUSCULAR at 15:12

## 2025-06-12 RX ADMIN — CETIRIZINE HYDROCHLORIDE 10 MG: 10 TABLET, FILM COATED ORAL at 15:05

## 2025-06-12 RX ADMIN — ACETAMINOPHEN 650 MG: 325 TABLET, FILM COATED ORAL at 15:05

## 2025-06-25 ENCOUNTER — HOSPITAL ENCOUNTER (OUTPATIENT)
Facility: HOSPITAL | Age: 45
Discharge: HOME | End: 2025-06-25
Admitting: INTERNAL MEDICINE
Payer: COMMERCIAL

## 2025-06-25 VITALS
OXYGEN SATURATION: 100 % | DIASTOLIC BLOOD PRESSURE: 86 MMHG | SYSTOLIC BLOOD PRESSURE: 128 MMHG | RESPIRATION RATE: 16 BRPM | HEART RATE: 88 BPM | TEMPERATURE: 97.7 F

## 2025-06-25 DIAGNOSIS — D50.0 IRON DEFICIENCY ANEMIA DUE TO CHRONIC BLOOD LOSS: Primary | ICD-10-CM

## 2025-06-25 DIAGNOSIS — K90.9 IRON MALABSORPTION: ICD-10-CM

## 2025-06-25 PROCEDURE — 25010000002 FAMOTIDINE 10 MG/ML SOLUTION: Performed by: INTERNAL MEDICINE

## 2025-06-25 PROCEDURE — 96372 THER/PROPH/DIAG INJ SC/IM: CPT

## 2025-06-25 PROCEDURE — 96365 THER/PROPH/DIAG IV INF INIT: CPT

## 2025-06-25 PROCEDURE — 25010000002 NA FERRIC GLUC CPLX PER 12.5 MG: Performed by: INTERNAL MEDICINE

## 2025-06-25 PROCEDURE — 96375 TX/PRO/DX INJ NEW DRUG ADDON: CPT

## 2025-06-25 PROCEDURE — 25010000002 CYANOCOBALAMIN PER 1000 MCG: Performed by: INTERNAL MEDICINE

## 2025-06-25 RX ORDER — ACETAMINOPHEN 325 MG/1
650 TABLET ORAL ONCE
Status: COMPLETED | OUTPATIENT
Start: 2025-06-25 | End: 2025-06-25

## 2025-06-25 RX ORDER — CYANOCOBALAMIN 1000 UG/ML
1000 INJECTION, SOLUTION INTRAMUSCULAR; SUBCUTANEOUS ONCE
Start: 2025-07-10 | End: 2025-07-10

## 2025-06-25 RX ORDER — CETIRIZINE HYDROCHLORIDE 10 MG/1
10 TABLET ORAL DAILY
Status: DISCONTINUED | OUTPATIENT
Start: 2025-06-25 | End: 2025-06-26 | Stop reason: HOSPADM

## 2025-06-25 RX ORDER — HYDROCORTISONE SODIUM SUCCINATE 100 MG/2ML
100 INJECTION INTRAMUSCULAR; INTRAVENOUS AS NEEDED
Status: DISCONTINUED | OUTPATIENT
Start: 2025-06-25 | End: 2025-06-26 | Stop reason: HOSPADM

## 2025-06-25 RX ORDER — CYANOCOBALAMIN 1000 UG/ML
1000 INJECTION, SOLUTION INTRAMUSCULAR; SUBCUTANEOUS ONCE
Status: COMPLETED | OUTPATIENT
Start: 2025-06-25 | End: 2025-06-25

## 2025-06-25 RX ORDER — DIPHENHYDRAMINE HYDROCHLORIDE 50 MG/ML
50 INJECTION, SOLUTION INTRAMUSCULAR; INTRAVENOUS AS NEEDED
Status: DISCONTINUED | OUTPATIENT
Start: 2025-06-25 | End: 2025-06-26 | Stop reason: HOSPADM

## 2025-06-25 RX ORDER — CYANOCOBALAMIN 1000 UG/ML
1000 INJECTION, SOLUTION INTRAMUSCULAR; SUBCUTANEOUS ONCE
Status: CANCELLED
Start: 2025-06-25 | End: 2025-06-25

## 2025-06-25 RX ORDER — CYANOCOBALAMIN 1000 UG/ML
1000 INJECTION, SOLUTION INTRAMUSCULAR; SUBCUTANEOUS ONCE
Status: DISCONTINUED | OUTPATIENT
Start: 2025-06-25 | End: 2025-06-26 | Stop reason: HOSPADM

## 2025-06-25 RX ORDER — FAMOTIDINE 10 MG/ML
20 INJECTION, SOLUTION INTRAVENOUS ONCE
Status: COMPLETED | OUTPATIENT
Start: 2025-06-25 | End: 2025-06-25

## 2025-06-25 RX ORDER — CYANOCOBALAMIN 1000 UG/ML
1000 INJECTION, SOLUTION INTRAMUSCULAR; SUBCUTANEOUS ONCE
Start: 2025-07-03 | End: 2025-07-03

## 2025-06-25 RX ORDER — FAMOTIDINE 10 MG/ML
20 INJECTION, SOLUTION INTRAVENOUS AS NEEDED
Status: DISCONTINUED | OUTPATIENT
Start: 2025-06-25 | End: 2025-06-26 | Stop reason: HOSPADM

## 2025-06-25 RX ADMIN — ACETAMINOPHEN 650 MG: 325 TABLET ORAL at 15:02

## 2025-06-25 RX ADMIN — CETIRIZINE HYDROCHLORIDE 10 MG: 10 TABLET, FILM COATED ORAL at 15:02

## 2025-06-25 RX ADMIN — FAMOTIDINE 20 MG: 10 INJECTION, SOLUTION INTRAVENOUS at 15:02

## 2025-06-25 RX ADMIN — CYANOCOBALAMIN 1000 MCG: 1000 INJECTION, SOLUTION INTRAMUSCULAR at 15:27

## 2025-06-25 RX ADMIN — SODIUM CHLORIDE 125 MG: 9 INJECTION, SOLUTION INTRAVENOUS at 15:18

## 2025-06-25 NOTE — CODE DOCUMENTATION
PIV established on first attempt. Treatment provided per tx plan (see EMAR). Pt tolerated tx w/o complications. PIV d/c'd per protocol, AVS provided. Pt ambulated out of clinic w/o any acute s/sx of distress.

## 2025-07-02 ENCOUNTER — HOSPITAL ENCOUNTER (OUTPATIENT)
Facility: HOSPITAL | Age: 45
Discharge: HOME | End: 2025-07-02
Admitting: INTERNAL MEDICINE
Payer: COMMERCIAL

## 2025-07-02 VITALS
SYSTOLIC BLOOD PRESSURE: 128 MMHG | OXYGEN SATURATION: 100 % | HEART RATE: 79 BPM | DIASTOLIC BLOOD PRESSURE: 86 MMHG | RESPIRATION RATE: 16 BRPM | TEMPERATURE: 98.2 F

## 2025-07-02 DIAGNOSIS — K90.9 IRON MALABSORPTION: ICD-10-CM

## 2025-07-02 DIAGNOSIS — E53.8 VITAMIN B 12 DEFICIENCY: Primary | ICD-10-CM

## 2025-07-02 DIAGNOSIS — D50.0 IRON DEFICIENCY ANEMIA DUE TO CHRONIC BLOOD LOSS: Primary | ICD-10-CM

## 2025-07-02 PROCEDURE — 96365 THER/PROPH/DIAG IV INF INIT: CPT

## 2025-07-02 PROCEDURE — 25010000002 CYANOCOBALAMIN PER 1000 MCG: Performed by: INTERNAL MEDICINE

## 2025-07-02 PROCEDURE — 25010000002 NA FERRIC GLUC CPLX PER 12.5 MG: Performed by: INTERNAL MEDICINE

## 2025-07-02 PROCEDURE — 96372 THER/PROPH/DIAG INJ SC/IM: CPT

## 2025-07-02 PROCEDURE — 25010000002 FAMOTIDINE 10 MG/ML SOLUTION: Performed by: INTERNAL MEDICINE

## 2025-07-02 PROCEDURE — 96375 TX/PRO/DX INJ NEW DRUG ADDON: CPT

## 2025-07-02 RX ORDER — FAMOTIDINE 10 MG/ML
20 INJECTION, SOLUTION INTRAVENOUS AS NEEDED
Status: DISCONTINUED | OUTPATIENT
Start: 2025-07-02 | End: 2025-07-03 | Stop reason: HOSPADM

## 2025-07-02 RX ORDER — HYDROCORTISONE SODIUM SUCCINATE 100 MG/2ML
100 INJECTION INTRAMUSCULAR; INTRAVENOUS AS NEEDED
Status: DISCONTINUED | OUTPATIENT
Start: 2025-07-02 | End: 2025-07-03 | Stop reason: HOSPADM

## 2025-07-02 RX ORDER — DIPHENHYDRAMINE HYDROCHLORIDE 50 MG/ML
50 INJECTION, SOLUTION INTRAMUSCULAR; INTRAVENOUS AS NEEDED
Status: DISCONTINUED | OUTPATIENT
Start: 2025-07-02 | End: 2025-07-03 | Stop reason: HOSPADM

## 2025-07-02 RX ORDER — CYANOCOBALAMIN 1000 UG/ML
1000 INJECTION, SOLUTION INTRAMUSCULAR; SUBCUTANEOUS ONCE
Status: COMPLETED | OUTPATIENT
Start: 2025-07-02 | End: 2025-07-02

## 2025-07-02 RX ORDER — FAMOTIDINE 10 MG/ML
20 INJECTION, SOLUTION INTRAVENOUS ONCE
Status: COMPLETED | OUTPATIENT
Start: 2025-07-02 | End: 2025-07-02

## 2025-07-02 RX ORDER — ACETAMINOPHEN 325 MG/1
650 TABLET ORAL ONCE
Status: COMPLETED | OUTPATIENT
Start: 2025-07-02 | End: 2025-07-02

## 2025-07-02 RX ORDER — CYANOCOBALAMIN 1000 UG/ML
1000 INJECTION, SOLUTION INTRAMUSCULAR; SUBCUTANEOUS ONCE
OUTPATIENT
Start: 2025-08-13

## 2025-07-02 RX ORDER — CETIRIZINE HYDROCHLORIDE 10 MG/1
10 TABLET ORAL DAILY
Status: DISCONTINUED | OUTPATIENT
Start: 2025-07-02 | End: 2025-07-03 | Stop reason: HOSPADM

## 2025-07-02 RX ADMIN — FAMOTIDINE 20 MG: 10 INJECTION, SOLUTION INTRAVENOUS at 14:51

## 2025-07-02 RX ADMIN — SODIUM CHLORIDE 125 MG: 9 INJECTION, SOLUTION INTRAVENOUS at 15:05

## 2025-07-02 RX ADMIN — CETIRIZINE HYDROCHLORIDE 10 MG: 10 TABLET, FILM COATED ORAL at 14:50

## 2025-07-02 RX ADMIN — CYANOCOBALAMIN 1000 MCG: 1000 INJECTION, SOLUTION INTRAMUSCULAR at 15:43

## 2025-07-02 RX ADMIN — ACETAMINOPHEN 650 MG: 325 TABLET ORAL at 14:50

## 2025-07-09 ENCOUNTER — HOSPITAL ENCOUNTER (OUTPATIENT)
Facility: HOSPITAL | Age: 45
Discharge: HOME | End: 2025-07-09
Admitting: INTERNAL MEDICINE
Payer: COMMERCIAL

## 2025-07-09 VITALS
RESPIRATION RATE: 16 BRPM | DIASTOLIC BLOOD PRESSURE: 89 MMHG | HEART RATE: 88 BPM | OXYGEN SATURATION: 100 % | SYSTOLIC BLOOD PRESSURE: 134 MMHG

## 2025-07-09 DIAGNOSIS — K90.9 IRON MALABSORPTION: ICD-10-CM

## 2025-07-09 DIAGNOSIS — D50.0 IRON DEFICIENCY ANEMIA DUE TO CHRONIC BLOOD LOSS: Primary | ICD-10-CM

## 2025-07-09 PROCEDURE — 25010000002 FAMOTIDINE 10 MG/ML SOLUTION: Performed by: INTERNAL MEDICINE

## 2025-07-09 PROCEDURE — 96372 THER/PROPH/DIAG INJ SC/IM: CPT

## 2025-07-09 PROCEDURE — 25010000002 CYANOCOBALAMIN PER 1000 MCG: Performed by: INTERNAL MEDICINE

## 2025-07-09 PROCEDURE — 25010000002 NA FERRIC GLUC CPLX PER 12.5 MG: Performed by: INTERNAL MEDICINE

## 2025-07-09 PROCEDURE — 96375 TX/PRO/DX INJ NEW DRUG ADDON: CPT

## 2025-07-09 PROCEDURE — 96365 THER/PROPH/DIAG IV INF INIT: CPT

## 2025-07-09 RX ORDER — CETIRIZINE HYDROCHLORIDE 10 MG/1
10 TABLET ORAL DAILY
Status: DISCONTINUED | OUTPATIENT
Start: 2025-07-09 | End: 2025-07-10 | Stop reason: HOSPADM

## 2025-07-09 RX ORDER — FAMOTIDINE 10 MG/ML
20 INJECTION, SOLUTION INTRAVENOUS ONCE
Status: COMPLETED | OUTPATIENT
Start: 2025-07-09 | End: 2025-07-09

## 2025-07-09 RX ORDER — CYANOCOBALAMIN 1000 UG/ML
1000 INJECTION, SOLUTION INTRAMUSCULAR; SUBCUTANEOUS ONCE
Status: COMPLETED | OUTPATIENT
Start: 2025-07-09 | End: 2025-07-09

## 2025-07-09 RX ORDER — ACETAMINOPHEN 325 MG/1
650 TABLET ORAL ONCE
Status: COMPLETED | OUTPATIENT
Start: 2025-07-09 | End: 2025-07-09

## 2025-07-09 RX ORDER — HYDROCORTISONE SODIUM SUCCINATE 100 MG/2ML
100 INJECTION INTRAMUSCULAR; INTRAVENOUS AS NEEDED
Status: DISCONTINUED | OUTPATIENT
Start: 2025-07-09 | End: 2025-07-10 | Stop reason: HOSPADM

## 2025-07-09 RX ORDER — SODIUM CHLORIDE 9 MG/ML
20 INJECTION, SOLUTION INTRAVENOUS ONCE
Status: DISCONTINUED | OUTPATIENT
Start: 2025-07-09 | End: 2025-07-10 | Stop reason: HOSPADM

## 2025-07-09 RX ORDER — DIPHENHYDRAMINE HYDROCHLORIDE 50 MG/ML
50 INJECTION, SOLUTION INTRAMUSCULAR; INTRAVENOUS AS NEEDED
Status: DISCONTINUED | OUTPATIENT
Start: 2025-07-09 | End: 2025-07-10 | Stop reason: HOSPADM

## 2025-07-09 RX ORDER — FAMOTIDINE 10 MG/ML
20 INJECTION, SOLUTION INTRAVENOUS AS NEEDED
Status: DISCONTINUED | OUTPATIENT
Start: 2025-07-09 | End: 2025-07-10 | Stop reason: HOSPADM

## 2025-07-09 RX ADMIN — FAMOTIDINE 20 MG: 10 INJECTION, SOLUTION INTRAVENOUS at 14:59

## 2025-07-09 RX ADMIN — ACETAMINOPHEN 650 MG: 325 TABLET ORAL at 14:58

## 2025-07-09 RX ADMIN — CYANOCOBALAMIN 1000 MCG: 1000 INJECTION, SOLUTION INTRAMUSCULAR at 15:05

## 2025-07-09 RX ADMIN — SODIUM CHLORIDE 125 MG: 9 INJECTION, SOLUTION INTRAVENOUS at 15:19

## 2025-07-09 RX ADMIN — CETIRIZINE HYDROCHLORIDE 10 MG: 10 TABLET, FILM COATED ORAL at 14:59

## 2025-07-16 ENCOUNTER — HOSPITAL ENCOUNTER (OUTPATIENT)
Facility: HOSPITAL | Age: 45
Discharge: HOME | End: 2025-07-16
Admitting: INTERNAL MEDICINE
Payer: COMMERCIAL

## 2025-07-16 VITALS
DIASTOLIC BLOOD PRESSURE: 98 MMHG | OXYGEN SATURATION: 99 % | HEART RATE: 82 BPM | TEMPERATURE: 97.9 F | SYSTOLIC BLOOD PRESSURE: 153 MMHG

## 2025-07-16 DIAGNOSIS — K90.9 IRON MALABSORPTION: ICD-10-CM

## 2025-07-16 DIAGNOSIS — D50.0 IRON DEFICIENCY ANEMIA DUE TO CHRONIC BLOOD LOSS: Primary | ICD-10-CM

## 2025-07-16 PROCEDURE — 25010000002 NA FERRIC GLUC CPLX PER 12.5 MG: Performed by: INTERNAL MEDICINE

## 2025-07-16 PROCEDURE — 96365 THER/PROPH/DIAG IV INF INIT: CPT

## 2025-07-16 PROCEDURE — 25010000002 FAMOTIDINE 10 MG/ML SOLUTION: Performed by: INTERNAL MEDICINE

## 2025-07-16 PROCEDURE — 96375 TX/PRO/DX INJ NEW DRUG ADDON: CPT

## 2025-07-16 PROCEDURE — 96372 THER/PROPH/DIAG INJ SC/IM: CPT

## 2025-07-16 PROCEDURE — 25010000002 CYANOCOBALAMIN PER 1000 MCG: Performed by: INTERNAL MEDICINE

## 2025-07-16 RX ORDER — ACETAMINOPHEN 325 MG/1
650 TABLET ORAL ONCE
Status: COMPLETED | OUTPATIENT
Start: 2025-07-16 | End: 2025-07-16

## 2025-07-16 RX ORDER — CYANOCOBALAMIN 1000 UG/ML
1000 INJECTION, SOLUTION INTRAMUSCULAR; SUBCUTANEOUS ONCE
Status: COMPLETED | OUTPATIENT
Start: 2025-07-16 | End: 2025-07-16

## 2025-07-16 RX ORDER — FAMOTIDINE 10 MG/ML
20 INJECTION, SOLUTION INTRAVENOUS ONCE
Status: COMPLETED | OUTPATIENT
Start: 2025-07-16 | End: 2025-07-16

## 2025-07-16 RX ORDER — CETIRIZINE HYDROCHLORIDE 10 MG/1
10 TABLET ORAL DAILY
Status: DISCONTINUED | OUTPATIENT
Start: 2025-07-16 | End: 2025-07-17 | Stop reason: HOSPADM

## 2025-07-16 RX ORDER — SODIUM CHLORIDE 9 MG/ML
20 INJECTION, SOLUTION INTRAVENOUS ONCE
Status: DISCONTINUED | OUTPATIENT
Start: 2025-07-16 | End: 2025-07-17 | Stop reason: HOSPADM

## 2025-07-16 RX ADMIN — SODIUM CHLORIDE 125 MG: 9 INJECTION, SOLUTION INTRAVENOUS at 14:58

## 2025-07-16 RX ADMIN — FAMOTIDINE 20 MG: 10 INJECTION, SOLUTION INTRAVENOUS at 14:56

## 2025-07-16 RX ADMIN — CETIRIZINE HYDROCHLORIDE 10 MG: 10 TABLET, FILM COATED ORAL at 14:54

## 2025-07-16 RX ADMIN — ACETAMINOPHEN 650 MG: 325 TABLET ORAL at 14:54

## 2025-07-16 RX ADMIN — CYANOCOBALAMIN 1000 MCG: 1000 INJECTION, SOLUTION INTRAMUSCULAR at 15:56

## (undated) DEVICE — HARMONIC 1100 SHEARS, 36CM SHAFT LENGTH: Brand: HARMONIC

## (undated) DEVICE — APPL HEMO SURG ARISTA/AH/FLEXITIP XLR 38CM

## (undated) DEVICE — SLV SCD CALF HEMOFORCE DVT THERP REPROC MD

## (undated) DEVICE — PLUG CATH W/CAP

## (undated) DEVICE — VLV SXN AIR/H2O ORCAPOD3 1P/U STRL

## (undated) DEVICE — ENDOSCOPY PORT CONNECTOR FOR OLYMPUS® SCOPES: Brand: ERBE

## (undated) DEVICE — LUBE JELLY PK/2.75GM STRL BX/144

## (undated) DEVICE — MANIP UTER ARCH KOHEFFICIENT 3.5CM

## (undated) DEVICE — HDRST POSTN SLOTTED A/

## (undated) DEVICE — GLV SURG BIOGEL PI ULTRATOUCH G SZ7.5 LF

## (undated) DEVICE — SOL POVIDONE IODINE 10PCT 3/4OZ STRL

## (undated) DEVICE — FRCP BIOP COLD ENDOJAW ALLGTR W/NDL 2.8X2300MM BLU

## (undated) DEVICE — SUCTION CANISTER, 1500CC, RIGID: Brand: DEROYAL

## (undated) DEVICE — DRP ADAPT ALLY UTER POSTN SYS 1P/U

## (undated) DEVICE — 2, DISPOSABLE SUCTION/IRRIGATOR WITHOUT DISPOSABLE TIP: Brand: STRYKEFLOW

## (undated) DEVICE — ANTIBACTERIAL UNDYED BRAIDED (POLYGLACTIN 910), SYNTHETIC ABSORBABLE SUTURE: Brand: COATED VICRYL

## (undated) DEVICE — ENDOPATH XCEL BLADELESS TROCARS WITH STABILITY SLEEVES: Brand: ENDOPATH XCEL

## (undated) DEVICE — MARKR SKIN W/RULR

## (undated) DEVICE — RICH MINOR LITHOTOMY: Brand: MEDLINE INDUSTRIES, INC.

## (undated) DEVICE — SUT GUT CHRM 2/0 SH 27IN G123H

## (undated) DEVICE — Device

## (undated) DEVICE — SOL IRR H2O BO 1000ML STRL

## (undated) DEVICE — SOL NACL 0.9PCT 1000ML

## (undated) DEVICE — RICH LAVH: Brand: MEDLINE INDUSTRIES, INC.

## (undated) DEVICE — 4-PORT MANIFOLD: Brand: NEPTUNE 2

## (undated) DEVICE — SYR LUERLOK 50ML

## (undated) DEVICE — ENSEAL X1 TISSUE SEALER, CURVED JAW, 37 CM SHAFT LENGTH: Brand: ENSEAL

## (undated) DEVICE — VAGINAL PACKING: Brand: DEROYAL

## (undated) DEVICE — CATH FOLEY LUBRICATH 3WY 16F 30CC

## (undated) DEVICE — SET,IRRIGATION,CYSTO/TUR: Brand: MEDLINE

## (undated) DEVICE — HYBRID TUBING/CAP SET FOR OLYMPUS® SCOPES: Brand: ERBE

## (undated) DEVICE — SOL IRR H2O BTL 1000ML STRL

## (undated) DEVICE — PAD GRND REM POLYHESIVE A/ DISP

## (undated) DEVICE — CONMED SCOPE SAVER BITE BLOCK, 20X27 MM: Brand: SCOPE SAVER

## (undated) DEVICE — MICRO HVTSA, 0.5G AND HVTSA SOURCEMARK PRODUCT CODE M1206 AND M1206-01: Brand: EXOFIN MICRO HVTSA, 0.5G

## (undated) DEVICE — ST TBG PNEUMOCLEAR EVAC SMOKE HIFLO

## (undated) DEVICE — GLV SURG SENSICARE PI LF PF 8 GRN STRL

## (undated) DEVICE — SUT VIC PLSTC PS4 C 4/0 18IN J656G

## (undated) DEVICE — RETRACTABLE L-HOOK LAPAROSCOPIC SEALER/DIVIDER: Brand: LIGASURE

## (undated) DEVICE — PREMIUM WET SKIN PREP TRAY CHG: Brand: MEDLINE INDUSTRIES, INC.

## (undated) DEVICE — GLV SURG BIOGEL M LTX PF 8

## (undated) DEVICE — ST IRR CYSTO W/SPK 77IN LF

## (undated) DEVICE — 40583 XL ADVANCED TRENDELENBURG POSITIONING KIT: Brand: 40583 XL ADVANCED TRENDELENBURG POSITIONING KIT

## (undated) DEVICE — MANIP UTER RUMI TP 6.7MMX8CM BLU